# Patient Record
Sex: FEMALE | Race: WHITE | NOT HISPANIC OR LATINO | Employment: OTHER | URBAN - METROPOLITAN AREA
[De-identification: names, ages, dates, MRNs, and addresses within clinical notes are randomized per-mention and may not be internally consistent; named-entity substitution may affect disease eponyms.]

---

## 2017-03-13 ENCOUNTER — CONVERSION ENCOUNTER (OUTPATIENT)
Dept: OTHER | Facility: HOSPITAL | Age: 52
End: 2017-03-13

## 2017-06-15 ENCOUNTER — CONVERSION ENCOUNTER (OUTPATIENT)
Dept: OTHER | Facility: HOSPITAL | Age: 52
End: 2017-06-15

## 2017-09-18 ENCOUNTER — CONVERSION ENCOUNTER (OUTPATIENT)
Dept: OTHER | Facility: HOSPITAL | Age: 52
End: 2017-09-18

## 2017-09-18 ENCOUNTER — HOSPITAL ENCOUNTER (OUTPATIENT)
Dept: OTHER | Facility: HOSPITAL | Age: 52
Setting detail: SPECIMEN
Discharge: HOME | End: 2017-09-18
Attending: FAMILY MEDICINE | Admitting: FAMILY MEDICINE

## 2017-09-19 ENCOUNTER — HOSPITAL ENCOUNTER (OUTPATIENT)
Dept: OTHER | Facility: HOSPITAL | Age: 52
Setting detail: SPECIMEN
Discharge: HOME | End: 2017-09-19
Attending: FAMILY MEDICINE | Admitting: FAMILY MEDICINE

## 2017-12-21 ENCOUNTER — CONVERSION ENCOUNTER (OUTPATIENT)
Dept: OTHER | Facility: HOSPITAL | Age: 52
End: 2017-12-21

## 2018-06-18 ENCOUNTER — CONVERSION ENCOUNTER (OUTPATIENT)
Dept: OTHER | Facility: HOSPITAL | Age: 53
End: 2018-06-18

## 2018-09-26 ENCOUNTER — CONVERSION ENCOUNTER (OUTPATIENT)
Dept: OTHER | Facility: HOSPITAL | Age: 53
End: 2018-09-26

## 2018-09-26 ENCOUNTER — HOSPITAL ENCOUNTER (OUTPATIENT)
Dept: OTHER | Facility: HOSPITAL | Age: 53
Setting detail: SPECIMEN
Discharge: HOME | End: 2018-09-26
Attending: FAMILY MEDICINE | Admitting: FAMILY MEDICINE

## 2018-09-26 LAB
ALBUMIN SERPL-MCNC: 3.6 G/DL (ref 3.5–4.8)
ALBUMIN/GLOB SERPL: 1.3 {RATIO} (ref 1–1.7)
ALP SERPL-CCNC: 72 IU/L (ref 32–91)
ALT SERPL-CCNC: 14 IU/L (ref 14–54)
ANION GAP SERPL CALC-SCNC: 13 MMOL/L (ref 10–20)
AST SERPL-CCNC: 17 IU/L (ref 15–41)
BASOPHILS # BLD AUTO: 0.1 10*3/UL (ref 0–0.2)
BASOPHILS NFR BLD AUTO: 1 % (ref 0–2)
BILIRUB SERPL-MCNC: 0.7 MG/DL (ref 0.3–1.2)
BUN SERPL-MCNC: 23 MG/DL (ref 8–20)
BUN/CREAT SERPL: 23 (ref 5.4–26.2)
CALCIUM SERPL-MCNC: 8.9 MG/DL (ref 8.9–10.3)
CHLORIDE SERPL-SCNC: 111 MMOL/L (ref 101–111)
CHOLEST SERPL-MCNC: 168 MG/DL
CHOLEST/HDLC SERPL: 2.7 {RATIO}
CONV CO2: 21 MMOL/L (ref 22–32)
CONV LDL CHOLESTEROL DIRECT: 97 MG/DL (ref 0–100)
CONV TOTAL PROTEIN: 6.4 G/DL (ref 6.1–7.9)
CREAT UR-MCNC: 1 MG/DL (ref 0.4–1)
DIFFERENTIAL METHOD BLD: (no result)
EOSINOPHIL # BLD AUTO: 0.3 10*3/UL (ref 0–0.3)
EOSINOPHIL # BLD AUTO: 4 % (ref 0–3)
ERYTHROCYTE [DISTWIDTH] IN BLOOD BY AUTOMATED COUNT: 15.2 % (ref 11.5–14.5)
GLOBULIN UR ELPH-MCNC: 2.8 G/DL (ref 2.5–3.8)
GLUCOSE SERPL-MCNC: 101 MG/DL (ref 65–99)
HCT VFR BLD AUTO: 43.2 % (ref 35–49)
HDLC SERPL-MCNC: 62 MG/DL
HGB BLD-MCNC: 14.7 G/DL (ref 12–15)
LDLC/HDLC SERPL: 1.6 {RATIO}
LIPID INTERPRETATION: NORMAL
LYMPHOCYTES # BLD AUTO: 2.7 10*3/UL (ref 0.8–4.8)
LYMPHOCYTES NFR BLD AUTO: 36 % (ref 18–42)
MCH RBC QN AUTO: 28.8 PG (ref 26–32)
MCHC RBC AUTO-ENTMCNC: 34 G/DL (ref 32–36)
MCV RBC AUTO: 84.9 FL (ref 80–94)
MONOCYTES # BLD AUTO: 0.6 10*3/UL (ref 0.1–1.3)
MONOCYTES NFR BLD AUTO: 9 % (ref 2–11)
NEUTROPHILS # BLD AUTO: 3.8 10*3/UL (ref 2.3–8.6)
NEUTROPHILS NFR BLD AUTO: 50 % (ref 50–75)
NRBC BLD AUTO-RTO: 0 /100{WBCS}
NRBC/RBC NFR BLD MANUAL: 0 10*3/UL
PLATELET # BLD AUTO: 171 10*3/UL (ref 150–450)
PMV BLD AUTO: 9 FL (ref 7.4–10.4)
POTASSIUM SERPL-SCNC: 4 MMOL/L (ref 3.6–5.1)
RBC # BLD AUTO: 5.08 10*6/UL (ref 4–5.4)
SODIUM SERPL-SCNC: 141 MMOL/L (ref 136–144)
TRIGL SERPL-MCNC: 54 MG/DL
VLDLC SERPL CALC-MCNC: 8.9 MG/DL
WBC # BLD AUTO: 7.5 10*3/UL (ref 4.5–11.5)

## 2018-09-28 LAB
A PHAGOCYTOPH IGG SER QL: NORMAL
A PHAGOCYTOPH IGM SER QL: NORMAL
ANA SER QL IA: NORMAL
B BURGDOR IGG+IGM SER-ACNC: NEGATIVE
CHROMATIN AB SERPL-ACNC: <20 [IU]/ML (ref 0–20)
E CHAFFEENSIS IGG TITR SER IF: NORMAL {TITER}
EHRLICHIA CHAFFEENSIS AB, IGM: NORMAL
R TYPHI IGG TITR SER IF: NOT DETECTED {TITER}
R TYPHI IGM SER-ACNC: NOT DETECTED
RICK SF IGG TITR SER IF: NOT DETECTED {TITER}
RICK SF IGM TITR SER IF: NOT DETECTED {TITER}

## 2019-01-08 ENCOUNTER — CONVERSION ENCOUNTER (OUTPATIENT)
Dept: OTHER | Facility: HOSPITAL | Age: 54
End: 2019-01-08

## 2019-03-25 ENCOUNTER — CONVERSION ENCOUNTER (OUTPATIENT)
Dept: OTHER | Facility: HOSPITAL | Age: 54
End: 2019-03-25

## 2019-06-04 VITALS
SYSTOLIC BLOOD PRESSURE: 152 MMHG | WEIGHT: 221 LBS | HEART RATE: 82 BPM | BODY MASS INDEX: 35.58 KG/M2 | SYSTOLIC BLOOD PRESSURE: 151 MMHG | DIASTOLIC BLOOD PRESSURE: 84 MMHG | HEIGHT: 68 IN | DIASTOLIC BLOOD PRESSURE: 82 MMHG | BODY MASS INDEX: 31.83 KG/M2 | WEIGHT: 223.6 LBS | HEIGHT: 68 IN | DIASTOLIC BLOOD PRESSURE: 84 MMHG | SYSTOLIC BLOOD PRESSURE: 150 MMHG | HEART RATE: 86 BPM | SYSTOLIC BLOOD PRESSURE: 154 MMHG | HEART RATE: 76 BPM | DIASTOLIC BLOOD PRESSURE: 86 MMHG | BODY MASS INDEX: 34 KG/M2 | OXYGEN SATURATION: 100 % | HEART RATE: 85 BPM | OXYGEN SATURATION: 98 % | HEIGHT: 68 IN | WEIGHT: 216.1 LBS | BODY MASS INDEX: 35.16 KG/M2 | OXYGEN SATURATION: 98 % | DIASTOLIC BLOOD PRESSURE: 82 MMHG | WEIGHT: 232 LBS | WEIGHT: 218.7 LBS | OXYGEN SATURATION: 97 % | SYSTOLIC BLOOD PRESSURE: 138 MMHG | BODY MASS INDEX: 32.86 KG/M2 | SYSTOLIC BLOOD PRESSURE: 135 MMHG | DIASTOLIC BLOOD PRESSURE: 83 MMHG | SYSTOLIC BLOOD PRESSURE: 148 MMHG | OXYGEN SATURATION: 99 % | DIASTOLIC BLOOD PRESSURE: 86 MMHG | BODY MASS INDEX: 35.92 KG/M2 | HEART RATE: 87 BPM | OXYGEN SATURATION: 99 % | OXYGEN SATURATION: 99 % | DIASTOLIC BLOOD PRESSURE: 85 MMHG | WEIGHT: 237 LBS | BODY MASS INDEX: 33.49 KG/M2 | OXYGEN SATURATION: 100 % | HEIGHT: 68 IN | WEIGHT: 210 LBS | HEIGHT: 68 IN | BODY MASS INDEX: 33.15 KG/M2 | HEART RATE: 88 BPM | HEART RATE: 96 BPM | WEIGHT: 234.8 LBS | SYSTOLIC BLOOD PRESSURE: 152 MMHG | HEART RATE: 86 BPM | HEIGHT: 68 IN

## 2019-06-25 RX ORDER — PHENTERMINE HYDROCHLORIDE 37.5 MG/1
CAPSULE ORAL
Qty: 30 CAPSULE | Refills: 1 | OUTPATIENT
Start: 2019-06-25

## 2019-06-25 NOTE — TELEPHONE ENCOUNTER
Unfortunately can no longer prescribe. No significant withdrawal should occur.  Pt will need to find another Provider within 90 days anyway.

## 2019-06-27 ENCOUNTER — OFFICE VISIT (OUTPATIENT)
Dept: FAMILY MEDICINE CLINIC | Facility: CLINIC | Age: 54
End: 2019-06-27

## 2019-06-27 VITALS
DIASTOLIC BLOOD PRESSURE: 88 MMHG | WEIGHT: 232.4 LBS | HEIGHT: 68 IN | BODY MASS INDEX: 35.22 KG/M2 | SYSTOLIC BLOOD PRESSURE: 156 MMHG | OXYGEN SATURATION: 100 % | HEART RATE: 80 BPM

## 2019-06-27 DIAGNOSIS — J44.9 ASTHMA WITH COPD (HCC): ICD-10-CM

## 2019-06-27 DIAGNOSIS — G89.4 CHRONIC PAIN DISORDER: ICD-10-CM

## 2019-06-27 DIAGNOSIS — E66.9 OBESITY WITHOUT SERIOUS COMORBIDITY, UNSPECIFIED CLASSIFICATION, UNSPECIFIED OBESITY TYPE: ICD-10-CM

## 2019-06-27 DIAGNOSIS — R87.619 ABNORMAL CYTOLOGICAL FINDING IN SPECIMEN FROM CERVIX UTERI: ICD-10-CM

## 2019-06-27 DIAGNOSIS — Z71.6 TOBACCO ABUSE COUNSELING: ICD-10-CM

## 2019-06-27 DIAGNOSIS — M79.7 FIBROMYALGIA: ICD-10-CM

## 2019-06-27 DIAGNOSIS — Z00.00 ENCOUNTER FOR ROUTINE HISTORY AND PHYSICAL EXAM IN FEMALE: ICD-10-CM

## 2019-06-27 DIAGNOSIS — Z01.419 WOMEN'S ANNUAL ROUTINE GYNECOLOGICAL EXAMINATION: Primary | ICD-10-CM

## 2019-06-27 PROBLEM — M54.12 CERVICAL RADICULOPATHY: Status: ACTIVE | Noted: 2019-06-27

## 2019-06-27 PROBLEM — F41.9 CHRONIC ANXIETY: Status: ACTIVE | Noted: 2019-03-25

## 2019-06-27 PROBLEM — J20.9 ACUTE BRONCHITIS: Status: ACTIVE | Noted: 2019-01-08

## 2019-06-27 PROCEDURE — 99214 OFFICE O/P EST MOD 30 MIN: CPT | Performed by: FAMILY MEDICINE

## 2019-06-27 PROCEDURE — G0101 CA SCREEN;PELVIC/BREAST EXAM: HCPCS | Performed by: FAMILY MEDICINE

## 2019-06-27 RX ORDER — ASPIRIN 325 MG
325 TABLET ORAL DAILY
COMMUNITY
End: 2021-07-16

## 2019-06-27 RX ORDER — FLUTICASONE PROPIONATE 50 MCG
2 SPRAY, SUSPENSION (ML) NASAL DAILY
COMMUNITY
Start: 2018-08-12 | End: 2019-09-11 | Stop reason: SDUPTHER

## 2019-06-27 RX ORDER — IBUPROFEN 800 MG/1
1 TABLET ORAL 4 TIMES DAILY PRN
COMMUNITY
Start: 2017-12-21 | End: 2020-03-20 | Stop reason: SDUPTHER

## 2019-06-27 RX ORDER — POLYETHYLENE GLYCOL 3350
GRANULES (GRAM) MISCELLANEOUS
COMMUNITY
Start: 2019-02-25 | End: 2019-08-21

## 2019-06-27 RX ORDER — MONTELUKAST SODIUM 10 MG/1
1 TABLET ORAL EVERY 24 HOURS
COMMUNITY
Start: 2018-05-27 | End: 2019-10-22 | Stop reason: SDUPTHER

## 2019-06-27 RX ORDER — DOCUSATE SODIUM 100 MG/1
CAPSULE, LIQUID FILLED ORAL DAILY
COMMUNITY
Start: 2016-06-02 | End: 2019-08-21

## 2019-06-27 RX ORDER — FUROSEMIDE 20 MG/1
TABLET ORAL
COMMUNITY
Start: 2018-05-07 | End: 2019-08-04 | Stop reason: SDUPTHER

## 2019-06-27 RX ORDER — ALBUTEROL SULFATE 90 UG/1
2 AEROSOL, METERED RESPIRATORY (INHALATION) 4 TIMES DAILY PRN
COMMUNITY
Start: 2016-06-02 | End: 2020-01-21

## 2019-06-27 RX ORDER — HYDROCODONE BITARTRATE AND ACETAMINOPHEN 10; 325 MG/1; MG/1
TABLET ORAL
COMMUNITY
Start: 2015-01-28 | End: 2019-07-02 | Stop reason: SDUPTHER

## 2019-06-27 RX ORDER — BENZONATATE 100 MG/1
1 CAPSULE ORAL 3 TIMES DAILY PRN
COMMUNITY
Start: 2019-01-08 | End: 2019-12-23

## 2019-06-27 RX ORDER — TOPIRAMATE 50 MG/1
TABLET, FILM COATED ORAL EVERY 12 HOURS
COMMUNITY
Start: 2018-04-22 | End: 2019-07-29

## 2019-06-27 RX ORDER — PHENTERMINE HYDROCHLORIDE 37.5 MG/1
CAPSULE ORAL
COMMUNITY
Start: 2018-04-22 | End: 2019-08-21 | Stop reason: SDUPTHER

## 2019-06-27 RX ORDER — GUAIFENESIN AND DEXTROMETHORPHAN HYDROBROMIDE 1200; 60 MG/1; MG/1
1 TABLET, EXTENDED RELEASE ORAL 2 TIMES DAILY PRN
COMMUNITY

## 2019-06-27 RX ORDER — LORATADINE 10 MG/1
10 TABLET ORAL DAILY
COMMUNITY
Start: 2015-01-28

## 2019-06-27 RX ORDER — POTASSIUM CHLORIDE 750 MG/1
TABLET, FILM COATED, EXTENDED RELEASE ORAL
COMMUNITY
Start: 2017-06-01 | End: 2019-08-28 | Stop reason: SDUPTHER

## 2019-06-27 NOTE — PATIENT INSTRUCTIONS
Pap completed and sent.  Mammogram order entered.  Recommend Colonoscopy/Cologard screening next year. (FH Neg).  Repeat Preventive Health Labs in 6 mo w F/U prn or in 6 mo. (Planning transfer of care to [facility]).

## 2019-06-27 NOTE — PROGRESS NOTES
"Subjective   Nicki Fraser is a 53 y.o. female.     Pt in for Annual GYN Exam w Preventive Visit and Labs.  Cont to struggle w Obesity and CPS due to Fibromyalgia which is affecting activity level.    /88   Pulse 80   Ht 172.7 cm (67.99\")   Wt 105 kg (232 lb 6.4 oz)   SpO2 100%   BMI 35.34 kg/m²      The following portions of the patient's history were reviewed and updated as appropriate: allergies, current medications, past family history, past medical history, past social history, past surgical history and problem list.    Review of Systems   Constitutional: Positive for fatigue.        Chronic Fatigue w Myalgias and Arthralgias.   HENT: Negative.    Eyes: Negative.    Respiratory: Positive for cough, chest tightness and wheezing.         Smoker trying to quit.   Cardiovascular: Positive for leg swelling. Negative for palpitations.        HBP.   Gastrointestinal: Negative.    Endocrine: Negative.    Genitourinary: Negative.    Musculoskeletal: Positive for arthralgias, back pain, gait problem, myalgias, neck pain and neck stiffness.   Skin: Negative.    Allergic/Immunologic: Negative.    Neurological: Positive for weakness.        CPS. LE Weakness.   Hematological: Negative.        Objective   Physical Exam   Constitutional: She is oriented to person, place, and time. No distress.   Chronic Overweight.   HENT:   Head: Normocephalic and atraumatic.   Nose: Nose normal.   Mouth/Throat: Oropharynx is clear and moist.   Eyes: Conjunctivae and EOM are normal. Pupils are equal, round, and reactive to light. No scleral icterus.   Neck: No thyromegaly present.   Mild stiffness and pain on ROM.     Cardiovascular: Normal rate, regular rhythm, normal heart sounds and intact distal pulses. Exam reveals no gallop.   No murmur heard.  Pulmonary/Chest: No respiratory distress. She has wheezes. She has no rales. She exhibits tenderness.   Mild-mod increase in Resp effort. No distress.  Scattered rhonchi " and wheezes bilat.   Genitourinary: Vagina normal and uterus normal.   Genitourinary Comments: Breasts symmetrical w no masses, tenderness, discharge, no adenopathy.  Cervix appears NL. Uterus mobile and non-tender.  Adnexae NL.    Musculoskeletal: She exhibits tenderness.   Diffuse muscle and joint tenderness. No acute joint inflammation.   Lymphadenopathy:     She has no cervical adenopathy.   Neurological: She is alert and oriented to person, place, and time. No cranial nerve deficit or sensory deficit. She exhibits abnormal muscle tone.   Skin: Skin is dry. Capillary refill takes 2 to 3 seconds. No rash noted.   Psychiatric: Her behavior is normal. Judgment and thought content normal.   Mild anxiety and depression.  No harm thoughts.       Assessment/Plan   Nicki was seen today for gynecologic exam.    Diagnoses and all orders for this visit:    Women's annual routine gynecological examination  -     Cancel: Liquid-based Pap Smear, Screening; Future  -     Cancel: Liquid-based Pap Smear, Screening  -     Liquid-based Pap Smear, Screening    Encounter for routine history and physical exam in female    Obesity without serious comorbidity, unspecified classification, unspecified obesity type    Fibromyalgia    Chronic pain disorder    Tobacco abuse counseling

## 2019-07-02 ENCOUNTER — OFFICE VISIT (OUTPATIENT)
Dept: PAIN MEDICINE | Facility: CLINIC | Age: 54
End: 2019-07-02

## 2019-07-02 VITALS
DIASTOLIC BLOOD PRESSURE: 97 MMHG | HEART RATE: 86 BPM | WEIGHT: 230 LBS | BODY MASS INDEX: 34.86 KG/M2 | OXYGEN SATURATION: 99 % | SYSTOLIC BLOOD PRESSURE: 163 MMHG | HEIGHT: 68 IN | TEMPERATURE: 98 F | RESPIRATION RATE: 16 BRPM

## 2019-07-02 DIAGNOSIS — M48.02 CERVICAL STENOSIS OF SPINAL CANAL: ICD-10-CM

## 2019-07-02 DIAGNOSIS — M54.2 CERVICALGIA: ICD-10-CM

## 2019-07-02 DIAGNOSIS — F19.90 CURRENT DRUG USE: Primary | ICD-10-CM

## 2019-07-02 DIAGNOSIS — M54.6 CHRONIC MIDLINE THORACIC BACK PAIN: ICD-10-CM

## 2019-07-02 DIAGNOSIS — M54.12 CERVICAL RADICULOPATHY: Primary | ICD-10-CM

## 2019-07-02 DIAGNOSIS — G89.29 CHRONIC MIDLINE LOW BACK PAIN, WITH SCIATICA PRESENCE UNSPECIFIED: ICD-10-CM

## 2019-07-02 DIAGNOSIS — G89.29 CHRONIC MIDLINE THORACIC BACK PAIN: ICD-10-CM

## 2019-07-02 DIAGNOSIS — M54.5 CHRONIC MIDLINE LOW BACK PAIN, WITH SCIATICA PRESENCE UNSPECIFIED: ICD-10-CM

## 2019-07-02 PROCEDURE — 99214 OFFICE O/P EST MOD 30 MIN: CPT | Performed by: PHYSICAL MEDICINE & REHABILITATION

## 2019-07-02 PROCEDURE — G0463 HOSPITAL OUTPT CLINIC VISIT: HCPCS | Performed by: PHYSICAL MEDICINE & REHABILITATION

## 2019-07-02 RX ORDER — HYDROCODONE BITARTRATE AND ACETAMINOPHEN 10; 325 MG/1; MG/1
1 TABLET ORAL EVERY 8 HOURS PRN
Qty: 21 TABLET | Refills: 0 | Status: SHIPPED | OUTPATIENT
Start: 2019-07-02 | End: 2019-07-15 | Stop reason: SDUPTHER

## 2019-07-02 NOTE — PROGRESS NOTES
Subjective   Nicki Fraser is a 53 y.o. female.     Neck and back pain, 10/10 at worst, 7/10 at best, always present, varies, aching, stabbing, worse with bending and lifting, interferes with ADLs, sleep, activity, failed PT, chiropractor, meds. MRI C-spine with DDD, stenosis. Saw Dr. Overton, had PT, given Norco 10mg TID prn, began receiving opioids from PCP, down to BID with less relief, PCP retiring. Failed injections, surgery in past.         The following portions of the patient's history were reviewed and updated as appropriate: allergies, current medications, past family history, past medical history, past social history, past surgical history and problem list.    Review of Systems   Constitutional: Positive for fatigue. Negative for chills and fever.   HENT: Negative for hearing loss and trouble swallowing.    Eyes: Negative for visual disturbance.   Respiratory: Negative for shortness of breath.    Cardiovascular: Negative for chest pain.   Gastrointestinal: Positive for constipation and nausea. Negative for abdominal pain, diarrhea and vomiting.   Genitourinary: Negative for urinary incontinence.   Musculoskeletal: Positive for arthralgias, back pain, joint swelling and neck pain. Negative for myalgias.   Neurological: Positive for dizziness and headache. Negative for weakness and numbness.       Objective   Physical Exam   Constitutional: She is oriented to person, place, and time. She appears well-developed and well-nourished.   HENT:   Head: Normocephalic and atraumatic.   Eyes: EOM are normal. Pupils are equal, round, and reactive to light.   Neck: Normal range of motion.   Cardiovascular: Normal rate, regular rhythm, normal heart sounds and intact distal pulses.   Pulmonary/Chest: Breath sounds normal.   Abdominal: Soft. Bowel sounds are normal. She exhibits no distension. There is no tenderness.   Neurological: She is alert and oriented to person, place, and time. She has normal strength  and normal reflexes. She displays normal reflexes. No sensory deficit.   Psychiatric: She has a normal mood and affect. Her behavior is normal. Thought content normal.         Assessment/Plan   Nicki was seen today for neck pain and back pain.    Diagnoses and all orders for this visit:    Cervical radiculopathy    Cervicalgia    Chronic midline thoracic back pain    Chronic midline low back pain, with sciatica presence unspecified    Cervical stenosis of spinal canal      Discussed risks and benefits of opioid treatment for chonic pain with patient, including expectations related to prescription requests, alternative modalities to opioids for managing pain, her treatment plan, risks of dependency and addiction, and safe storage practices for prescribed opioids, as well as proper and improper disposal of all medications.  Will obtain UDS today, pain contract today.  Inspect report reviewed, prior notes reviewed, consistent with patient's stated history.  Treatment plan will consist of continuing current medication as long as it remains effective and is necessary, while evaluating patient at each visit and determining if the medication can be lowered or discontinued, while also using nonopioid therapies to reduce reliance on opioids.  Increase to Norco 10mg TID prn next refill.  Failed injections, surgery.  RTC 1-2 months for f/u. Will escribe [place] if UDS in order before next visit.

## 2019-07-09 DIAGNOSIS — R87.610 ATYPICAL SQUAMOUS CELLS OF UNDETERMINED SIGNIFICANCE (ASCUS) ON PAPANICOLAOU SMEAR OF CERVIX: Primary | ICD-10-CM

## 2019-07-09 LAB
CONV .: ABNORMAL
CYTOLOGIST CVX/VAG CYTO: ABNORMAL
CYTOLOGY CVX/VAG DOC CYTO: ABNORMAL
DX ICD CODE: ABNORMAL
DX ICD CODE: ABNORMAL
HIV 1 & 2 AB SER-IMP: ABNORMAL
HPV I/H RISK 1 DNA CVX QL PROBE+SIG AMP: NEGATIVE
OTHER STN SPEC: ABNORMAL
PATHOLOGIST CVX/VAG CYTO: ABNORMAL
STAT OF ADQ CVX/VAG CYTO-IMP: ABNORMAL

## 2019-07-15 ENCOUNTER — TELEPHONE (OUTPATIENT)
Dept: PAIN MEDICINE | Facility: HOSPITAL | Age: 54
End: 2019-07-15

## 2019-07-15 RX ORDER — HYDROCODONE BITARTRATE AND ACETAMINOPHEN 10; 325 MG/1; MG/1
1 TABLET ORAL EVERY 8 HOURS PRN
Qty: 90 TABLET | Refills: 0 | Status: SHIPPED | OUTPATIENT
Start: 2019-07-15 | End: 2019-08-05 | Stop reason: SDUPTHER

## 2019-07-29 RX ORDER — POTASSIUM CHLORIDE 750 MG/1
TABLET, EXTENDED RELEASE ORAL
Qty: 90 TABLET | Refills: 1 | Status: SHIPPED | OUTPATIENT
Start: 2019-07-29 | End: 2019-08-28

## 2019-07-29 RX ORDER — TOPIRAMATE 25 MG/1
TABLET ORAL
Qty: 60 TABLET | Refills: 5 | Status: SHIPPED | OUTPATIENT
Start: 2019-07-29 | End: 2019-08-28

## 2019-08-02 RX ORDER — POLYETHYLENE GLYCOL 3350 17 G/17G
POWDER, FOR SOLUTION ORAL
Qty: 1 EACH | Refills: 4 | Status: SHIPPED | OUTPATIENT
Start: 2019-08-02 | End: 2019-08-21 | Stop reason: SDUPTHER

## 2019-08-05 ENCOUNTER — OFFICE VISIT (OUTPATIENT)
Dept: PAIN MEDICINE | Facility: CLINIC | Age: 54
End: 2019-08-05

## 2019-08-05 VITALS
OXYGEN SATURATION: 98 % | DIASTOLIC BLOOD PRESSURE: 91 MMHG | HEART RATE: 87 BPM | HEIGHT: 68 IN | SYSTOLIC BLOOD PRESSURE: 160 MMHG | TEMPERATURE: 96.7 F | RESPIRATION RATE: 16 BRPM | WEIGHT: 234 LBS | BODY MASS INDEX: 35.46 KG/M2

## 2019-08-05 DIAGNOSIS — M25.50 ARTHRALGIA, UNSPECIFIED JOINT: ICD-10-CM

## 2019-08-05 DIAGNOSIS — M54.40 CHRONIC BILATERAL LOW BACK PAIN WITH SCIATICA, SCIATICA LATERALITY UNSPECIFIED: ICD-10-CM

## 2019-08-05 DIAGNOSIS — M48.02 CERVICAL SPINAL STENOSIS: ICD-10-CM

## 2019-08-05 DIAGNOSIS — G89.4 CHRONIC PAIN DISORDER: ICD-10-CM

## 2019-08-05 DIAGNOSIS — M54.12 CERVICAL RADICULOPATHY: Primary | ICD-10-CM

## 2019-08-05 DIAGNOSIS — M79.7 FIBROMYALGIA: ICD-10-CM

## 2019-08-05 DIAGNOSIS — M54.6 CHRONIC BILATERAL THORACIC BACK PAIN: ICD-10-CM

## 2019-08-05 DIAGNOSIS — M79.10 MYALGIA: ICD-10-CM

## 2019-08-05 DIAGNOSIS — G89.29 CHRONIC BILATERAL LOW BACK PAIN WITH SCIATICA, SCIATICA LATERALITY UNSPECIFIED: ICD-10-CM

## 2019-08-05 DIAGNOSIS — G89.29 CHRONIC BILATERAL THORACIC BACK PAIN: ICD-10-CM

## 2019-08-05 PROBLEM — M54.41 CHRONIC BILATERAL LOW BACK PAIN WITH SCIATICA: Status: ACTIVE | Noted: 2019-08-05

## 2019-08-05 PROBLEM — M54.42 CHRONIC BILATERAL LOW BACK PAIN WITH SCIATICA: Status: ACTIVE | Noted: 2019-08-05

## 2019-08-05 PROCEDURE — 99214 OFFICE O/P EST MOD 30 MIN: CPT | Performed by: PHYSICAL MEDICINE & REHABILITATION

## 2019-08-05 PROCEDURE — G0463 HOSPITAL OUTPT CLINIC VISIT: HCPCS | Performed by: PHYSICAL MEDICINE & REHABILITATION

## 2019-08-05 RX ORDER — HYDROCODONE BITARTRATE AND ACETAMINOPHEN 10; 325 MG/1; MG/1
1 TABLET ORAL EVERY 8 HOURS PRN
Qty: 90 TABLET | Refills: 0 | Status: SHIPPED | OUTPATIENT
Start: 2019-08-05 | End: 2019-08-05 | Stop reason: SDUPTHER

## 2019-08-05 RX ORDER — HYDROCODONE BITARTRATE AND ACETAMINOPHEN 10; 325 MG/1; MG/1
1 TABLET ORAL EVERY 8 HOURS PRN
Qty: 90 TABLET | Refills: 0 | Status: SHIPPED | OUTPATIENT
Start: 2019-08-05 | End: 2019-11-04 | Stop reason: SDUPTHER

## 2019-08-05 RX ORDER — FUROSEMIDE 20 MG/1
TABLET ORAL
Qty: 60 TABLET | Refills: 4 | Status: SHIPPED | OUTPATIENT
Start: 2019-08-05 | End: 2019-08-28

## 2019-08-05 RX ORDER — TIZANIDINE 4 MG/1
4 TABLET ORAL EVERY 8 HOURS PRN
Qty: 90 TABLET | Refills: 2 | Status: SHIPPED | OUTPATIENT
Start: 2019-08-05 | End: 2020-01-21

## 2019-08-05 NOTE — PROGRESS NOTES
Subjective   Nicki Fraser is a 53 y.o. female.     Neck and back pain, 10/10 at worst, 7/10 at best, always present, varies, aching, stabbing, worse with bending and lifting, interferes with ADLs, sleep, activity, failed PT, chiropractor, meds. MRI C-spine with DDD, stenosis. Saw Dr. Overton, had PT, given Norco 10mg TID prn, began receiving opioids from PCP, down to BID with less relief, PCP retiring, increased here to TID prn. Failed injections, surgery in past. Back pain worse after housework, no b/b incontinence, no new weakness or numbness.         The following portions of the patient's history were reviewed and updated as appropriate: allergies, current medications, past family history, past medical history, past social history, past surgical history and problem list.    Review of Systems   Constitutional: Positive for fatigue. Negative for chills and fever.   HENT: Negative for hearing loss and trouble swallowing.    Eyes: Negative for visual disturbance.   Respiratory: Negative for shortness of breath.    Cardiovascular: Negative for chest pain.   Gastrointestinal: Positive for constipation and nausea. Negative for abdominal pain, diarrhea and vomiting.   Genitourinary: Negative for urinary incontinence.   Musculoskeletal: Positive for arthralgias, back pain, joint swelling and neck pain. Negative for myalgias.   Neurological: Positive for dizziness and headache. Negative for weakness and numbness.       Objective   Physical Exam   Constitutional: She is oriented to person, place, and time. She appears well-developed and well-nourished.   HENT:   Head: Normocephalic and atraumatic.   Eyes: EOM are normal. Pupils are equal, round, and reactive to light.   Neck: Normal range of motion.   Cardiovascular: Normal rate, regular rhythm, normal heart sounds and intact distal pulses.   Pulmonary/Chest: Breath sounds normal.   Abdominal: Soft. Bowel sounds are normal. She exhibits no distension. There is  no tenderness.   Neurological: She is alert and oriented to person, place, and time. She has normal strength and normal reflexes. She displays normal reflexes. No sensory deficit.   Psychiatric: She has a normal mood and affect. Her behavior is normal. Thought content normal.         Assessment/Plan   Nicki was seen today for back pain.    Diagnoses and all orders for this visit:    Cervical radiculopathy    Arthralgia, unspecified joint    Myalgia    Chronic pain disorder    Fibromyalgia    Chronic bilateral thoracic back pain    Chronic bilateral low back pain with sciatica, sciatica laterality unspecified    Cervical spinal stenosis      Inspect reviewed, in order. UDS in order 7/2/19.  Treatment plan will consist of continuing current medication as long as it remains effective and is necessary, while evaluating patient at each visit and determining if the medication can be lowered or discontinued, while also using nonopioid therapies to reduce reliance on opioids.  Increased to Norco 10mg TID prn, continue.  Begin Tizanidine 4mg TID prn.   Failed injections, surgery.  Pt will ask PCP for referral to allergist for possible bee sting allergy.  RTC 3 months for f/u.

## 2019-08-21 ENCOUNTER — OFFICE VISIT (OUTPATIENT)
Dept: FAMILY MEDICINE CLINIC | Facility: CLINIC | Age: 54
End: 2019-08-21

## 2019-08-21 VITALS
RESPIRATION RATE: 14 BRPM | TEMPERATURE: 98 F | HEIGHT: 68 IN | OXYGEN SATURATION: 99 % | HEART RATE: 77 BPM | BODY MASS INDEX: 35.61 KG/M2 | DIASTOLIC BLOOD PRESSURE: 85 MMHG | WEIGHT: 235 LBS | SYSTOLIC BLOOD PRESSURE: 146 MMHG

## 2019-08-21 DIAGNOSIS — J45.41 MODERATE PERSISTENT ASTHMA WITH ACUTE EXACERBATION: ICD-10-CM

## 2019-08-21 DIAGNOSIS — E66.01 MORBIDLY OBESE (HCC): ICD-10-CM

## 2019-08-21 DIAGNOSIS — I10 ESSENTIAL HYPERTENSION: Primary | ICD-10-CM

## 2019-08-21 DIAGNOSIS — Z72.0 CONTINUOUS TOBACCO ABUSE: ICD-10-CM

## 2019-08-21 PROCEDURE — 99214 OFFICE O/P EST MOD 30 MIN: CPT | Performed by: FAMILY MEDICINE

## 2019-08-21 RX ORDER — POLYETHYLENE GLYCOL 3350 17 G/17G
17 POWDER, FOR SOLUTION ORAL DAILY
Qty: 500 G | Refills: 4 | Status: SHIPPED | OUTPATIENT
Start: 2019-08-21 | End: 2020-06-01 | Stop reason: SDUPTHER

## 2019-08-21 RX ORDER — PHENTERMINE HYDROCHLORIDE 37.5 MG/1
37.5 CAPSULE ORAL EVERY MORNING
Qty: 30 CAPSULE | Refills: 0 | Status: SHIPPED | OUTPATIENT
Start: 2019-08-21 | End: 2019-09-17 | Stop reason: SDUPTHER

## 2019-08-28 ENCOUNTER — TELEPHONE (OUTPATIENT)
Dept: FAMILY MEDICINE CLINIC | Facility: CLINIC | Age: 54
End: 2019-08-28

## 2019-08-28 PROBLEM — Z00.00 ENCOUNTER FOR ROUTINE HISTORY AND PHYSICAL EXAM IN FEMALE: Status: RESOLVED | Noted: 2017-09-18 | Resolved: 2019-08-28

## 2019-08-28 PROBLEM — I10 HYPERTENSION: Status: ACTIVE | Noted: 2019-08-28

## 2019-08-28 PROBLEM — G43.909 MIGRAINE HEADACHE: Status: ACTIVE | Noted: 2019-08-28

## 2019-08-28 RX ORDER — TOPIRAMATE 25 MG/1
25 TABLET ORAL 2 TIMES DAILY
Qty: 60 TABLET | Refills: 5
Start: 2019-08-28 | End: 2020-01-21 | Stop reason: SDUPTHER

## 2019-08-28 RX ORDER — POTASSIUM CHLORIDE 750 MG/1
10 TABLET, EXTENDED RELEASE ORAL DAILY
Qty: 90 TABLET | Refills: 1
Start: 2019-08-28 | End: 2019-10-22

## 2019-08-28 RX ORDER — FUROSEMIDE 20 MG/1
20 TABLET ORAL DAILY
Qty: 60 TABLET | Refills: 4
Start: 2019-08-28 | End: 2019-10-22

## 2019-08-28 NOTE — TELEPHONE ENCOUNTER
Please sign off on 8/21 OV so we can fax to [facility]. They need the notes faxed before they can fill neb/meds.    Fax to [facility] pharm # [phone]

## 2019-08-28 NOTE — PROGRESS NOTES
Subjective   Nicki Fraser is a 53 y.o. female.     Chief Complaint   Patient presents with   • Weight Gain     patient would like to get phentermine refilled if possible.    • Asthma         Current Outpatient Medications:   •  albuterol sulfate HFA (VENTOLIN HFA) 108 (90 Base) MCG/ACT inhaler, Inhale 2 puffs 4 (Four) Times a Day As Needed., Disp: , Rfl:   •  aspirin 325 MG tablet, Take 325 mg by mouth Daily., Disp: , Rfl:   •  benzonatate (TESSALON PERLES) 100 MG capsule, 1 capsule 3 (Three) Times a Day As Needed., Disp: , Rfl:   •  Cholecalciferol (EQL VITAMIN D3) 2000 units capsule, Take 1 capsule by mouth Daily., Disp: , Rfl:   •  dextromethorphan-guaifenesin (MUCINEX DM MAX STRENGTH)  MG per 12 hr tablet, Take 1 tablet by mouth 2 (Two) Times a Day As Needed., Disp: , Rfl:   •  fluticasone (FLONASE) 50 MCG/ACT nasal spray, 2 sprays by Each Nare route Daily., Disp: , Rfl:   •  furosemide (LASIX) 20 MG tablet, Take 1 tablet by mouth Daily., Disp: 60 tablet, Rfl: 4  •  HYDROcodone-acetaminophen (NORCO)  MG per tablet, Take 1 tablet by mouth Every 8 (Eight) Hours As Needed for Moderate Pain ., Disp: 90 tablet, Rfl: 0  •  ibuprofen (ADVIL,MOTRIN) 800 MG tablet, Take 1 tablet by mouth 4 (Four) Times a Day As Needed., Disp: , Rfl:   •  loratadine (CLARITIN) 10 MG tablet, Take 10 mg by mouth Daily., Disp: , Rfl:   •  montelukast (SINGULAIR) 10 MG tablet, Take 1 tablet by mouth Daily., Disp: , Rfl:   •  phentermine 37.5 MG capsule, Take 1 capsule by mouth Every Morning., Disp: 30 capsule, Rfl: 0  •  polyethylene glycol (MIRALAX) powder, Take 17 g by mouth Daily., Disp: 500 g, Rfl: 4  •  potassium chloride (K-DUR,KLOR-CON) 10 MEQ CR tablet, Take 1 tablet by mouth Daily., Disp: 90 tablet, Rfl: 1  •  tiZANidine (ZANAFLEX) 4 MG tablet, Take 1 tablet by mouth Every 8 (Eight) Hours As Needed for Muscle Spasms., Disp: 90 tablet, Rfl: 2  •  topiramate (TOPAMAX) 25 MG tablet, Take 1 tablet by mouth 2  (Two) Times a Day., Disp: 60 tablet, Rfl: 5    Past Medical History:   Diagnosis Date   • Cervical radiculopathy    • DDD (degenerative disc disease), cervical    • Fibromyalgia    • Herniated disc, cervical     Abstraction from [employer] Herniated Disc; Cervical Degenerated disc.   • Hypertension    • Left shoulder pain    • Migraine headache    • Myalgia    • Peripheral edema        Past Surgical History:   Procedure Laterality Date   •  SECTION     • LYMPH NODE DISSECTION      cervical node= NEG   • TUBAL ABDOMINAL LIGATION         Family History   Problem Relation Age of Onset   • Heart disease Mother    • Osteoporosis Mother    • Hypertension Father    • Kidney disease Father         KIDNEY STONES   • Stroke Father    • Arthritis Brother    • Diabetes Brother    • Heart disease Brother    • Hypertension Brother        Social History     Socioeconomic History   • Marital status:      Spouse name: Not on file   • Number of children: Not on file   • Years of education: Not on file   • Highest education level: Not on file   Tobacco Use   • Smoking status: Current Every Day Smoker     Packs/day: 1.00     Years: 30.00     Pack years: 30.00     Types: Cigarettes   • Smokeless tobacco: Never Used   Substance and Sexual Activity   • Alcohol use: Yes     Comment: occ   • Drug use: No   • Sexual activity: No       54y/o C female pt here to [facility] care w/ hx/o Asthma/ HTN/ Tob Abuse and wanting refill on Phentermine for long term wt loss w/ her topamax         The following portions of the patient's history were reviewed and updated as appropriate: allergies, current medications, past family history, past medical history, past social history, past surgical history and problem list.    Review of Systems   Constitutional: Positive for unexpected weight gain.   Respiratory: Positive for cough, shortness of breath and wheezing. Negative for apnea, choking and stridor.    Genitourinary: Negative for decreased  urine volume, difficulty urinating and dysuria.       Vitals:    08/21/19 1032   BP: 146/85   Pulse: 77   Resp: 14   Temp: 98 °F (36.7 °C)   SpO2: 99%       Objective   Physical Exam   Constitutional: She is oriented to person, place, and time. She appears well-developed and well-nourished. She is cooperative. She is obese.  HENT:   Head: Normocephalic and atraumatic.   Neck: Normal range of motion. Neck supple.   Cardiovascular: Normal rate, regular rhythm, normal heart sounds and intact distal pulses.   No murmur heard.  Pulmonary/Chest: Effort normal. She has decreased breath sounds. She has wheezes. She has no rales.   Musculoskeletal: She exhibits no edema.   Neurological: She is alert and oriented to person, place, and time.   Skin: Skin is warm and dry. No rash noted.   Psychiatric: She has a normal mood and affect. Her behavior is normal. Judgment and thought content normal.   Nursing note and vitals reviewed.        Assessment/Plan   Nicki was seen today for weight gain and asthma.    Diagnoses and all orders for this visit:    Essential hypertension  -     Lipid Panel; Future  -     Comprehensive Metabolic Panel; Future    Moderate persistent asthma with acute exacerbation    Morbidly obese (CMS/HCC)    Continuous tobacco abuse    Other orders  -     phentermine 37.5 MG capsule; Take 1 capsule by mouth Every Morning.  -     polyethylene glycol (MIRALAX) powder; Take 17 g by mouth Daily.  -     topiramate (TOPAMAX) 25 MG tablet; Take 1 tablet by mouth 2 (Two) Times a Day.  -     furosemide (LASIX) 20 MG tablet; Take 1 tablet by mouth Daily.  -     potassium chloride (K-DUR,KLOR-CON) 10 MEQ CR tablet; Take 1 tablet by mouth Daily.

## 2019-09-11 RX ORDER — FLUTICASONE PROPIONATE 50 MCG
SPRAY, SUSPENSION (ML) NASAL
Qty: 1 BOTTLE | Refills: 0 | Status: SHIPPED | OUTPATIENT
Start: 2019-09-11 | End: 2019-11-01 | Stop reason: SDUPTHER

## 2019-09-17 RX ORDER — PHENTERMINE HYDROCHLORIDE 37.5 MG/1
CAPSULE ORAL
Qty: 30 CAPSULE | Refills: 0 | Status: SHIPPED | OUTPATIENT
Start: 2019-09-17 | End: 2019-10-22 | Stop reason: SDUPTHER

## 2019-09-23 RX ORDER — IBUPROFEN 800 MG/1
TABLET ORAL
Qty: 90 TABLET | Refills: 4 | OUTPATIENT
Start: 2019-09-23

## 2019-09-25 ENCOUNTER — CLINICAL SUPPORT (OUTPATIENT)
Dept: FAMILY MEDICINE CLINIC | Facility: CLINIC | Age: 54
End: 2019-09-25

## 2019-09-25 VITALS
HEART RATE: 76 BPM | DIASTOLIC BLOOD PRESSURE: 77 MMHG | WEIGHT: 234 LBS | SYSTOLIC BLOOD PRESSURE: 129 MMHG | BODY MASS INDEX: 35.58 KG/M2

## 2019-09-25 DIAGNOSIS — I10 ESSENTIAL HYPERTENSION: ICD-10-CM

## 2019-09-25 LAB
ALBUMIN SERPL-MCNC: 3.7 G/DL (ref 3.5–4.8)
ALBUMIN/GLOB SERPL: 1.4 G/DL (ref 1–1.7)
ALP SERPL-CCNC: 84 U/L (ref 32–91)
ALT SERPL W P-5'-P-CCNC: 17 U/L (ref 14–54)
ANION GAP SERPL CALCULATED.3IONS-SCNC: 14.5 MMOL/L (ref 5–15)
ARTICHOKE IGE QN: 104 MG/DL (ref 0–100)
AST SERPL-CCNC: 18 U/L (ref 15–41)
BILIRUB SERPL-MCNC: 0.4 MG/DL (ref 0.3–1.2)
BUN BLD-MCNC: 23 MG/DL (ref 8–20)
BUN/CREAT SERPL: 20.9 (ref 5.4–26.2)
CALCIUM SPEC-SCNC: 9.3 MG/DL (ref 8.9–10.3)
CHLORIDE SERPL-SCNC: 106 MMOL/L (ref 101–111)
CHOLEST SERPL-MCNC: 168 MG/DL
CO2 SERPL-SCNC: 25 MMOL/L (ref 22–32)
CREAT BLD-MCNC: 1.1 MG/DL (ref 0.4–1)
GFR SERPL CREATININE-BSD FRML MDRD: 52 ML/MIN/1.73
GLOBULIN UR ELPH-MCNC: 2.7 GM/DL (ref 2.5–3.8)
GLUCOSE BLD-MCNC: 111 MG/DL (ref 65–99)
HDLC SERPL QL: 2.51
HDLC SERPL-MCNC: 67 MG/DL
LDLC/HDLC SERPL: 1.36 {RATIO}
POTASSIUM BLD-SCNC: 4.5 MMOL/L (ref 3.6–5.1)
PROT SERPL-MCNC: 6.4 G/DL (ref 6.1–7.9)
SODIUM BLD-SCNC: 141 MMOL/L (ref 136–144)
TRIGL SERPL-MCNC: 49 MG/DL
VLDLC SERPL-MCNC: 9.8 MG/DL

## 2019-09-25 PROCEDURE — 36415 COLL VENOUS BLD VENIPUNCTURE: CPT | Performed by: FAMILY MEDICINE

## 2019-09-25 PROCEDURE — 80061 LIPID PANEL: CPT | Performed by: FAMILY MEDICINE

## 2019-09-25 PROCEDURE — 80053 COMPREHEN METABOLIC PANEL: CPT | Performed by: FAMILY MEDICINE

## 2019-10-17 ENCOUNTER — TELEPHONE (OUTPATIENT)
Dept: PAIN MEDICINE | Facility: CLINIC | Age: 54
End: 2019-10-17

## 2019-10-17 NOTE — TELEPHONE ENCOUNTER
PA for Hydrocodone was denied it is a benefit exclusion and is not covered by the plan. No alternatives were given.

## 2019-10-22 ENCOUNTER — OFFICE VISIT (OUTPATIENT)
Dept: FAMILY MEDICINE CLINIC | Facility: CLINIC | Age: 54
End: 2019-10-22

## 2019-10-22 VITALS
DIASTOLIC BLOOD PRESSURE: 88 MMHG | HEIGHT: 68 IN | OXYGEN SATURATION: 100 % | SYSTOLIC BLOOD PRESSURE: 144 MMHG | HEART RATE: 87 BPM | BODY MASS INDEX: 35.46 KG/M2 | WEIGHT: 234 LBS | RESPIRATION RATE: 18 BRPM | TEMPERATURE: 98.7 F

## 2019-10-22 DIAGNOSIS — E66.01 CLASS 3 SEVERE OBESITY DUE TO EXCESS CALORIES WITHOUT SERIOUS COMORBIDITY WITH BODY MASS INDEX (BMI) OF 40.0 TO 44.9 IN ADULT (HCC): ICD-10-CM

## 2019-10-22 DIAGNOSIS — I10 ESSENTIAL HYPERTENSION: ICD-10-CM

## 2019-10-22 DIAGNOSIS — J01.00 ACUTE MAXILLARY SINUSITIS, RECURRENCE NOT SPECIFIED: Primary | ICD-10-CM

## 2019-10-22 DIAGNOSIS — N28.9 RENAL INSUFFICIENCY: ICD-10-CM

## 2019-10-22 PROCEDURE — 99214 OFFICE O/P EST MOD 30 MIN: CPT | Performed by: FAMILY MEDICINE

## 2019-10-22 RX ORDER — EPINEPHRINE 0.3 MG/.3ML
0.3 INJECTION SUBCUTANEOUS ONCE
Qty: 1 EACH | Refills: 0 | Status: SHIPPED | OUTPATIENT
Start: 2019-10-22 | End: 2019-10-22

## 2019-10-22 RX ORDER — MONTELUKAST SODIUM 10 MG/1
10 TABLET ORAL EVERY 24 HOURS
Qty: 90 TABLET | Refills: 1 | Status: SHIPPED | OUTPATIENT
Start: 2019-10-22 | End: 2020-05-11

## 2019-10-22 RX ORDER — PHENTERMINE HYDROCHLORIDE 37.5 MG/1
37.5 CAPSULE ORAL EVERY MORNING
Qty: 30 CAPSULE | Refills: 0 | Status: SHIPPED | OUTPATIENT
Start: 2019-10-22 | End: 2019-11-25 | Stop reason: SDUPTHER

## 2019-10-22 RX ORDER — LISINOPRIL 10 MG/1
10 TABLET ORAL DAILY
Qty: 90 TABLET | Refills: 0 | Status: SHIPPED | OUTPATIENT
Start: 2019-10-22 | End: 2020-01-16 | Stop reason: SDUPTHER

## 2019-10-22 RX ORDER — AMOXICILLIN 875 MG/1
875 TABLET, COATED ORAL 2 TIMES DAILY
Qty: 20 TABLET | Refills: 0 | Status: SHIPPED | OUTPATIENT
Start: 2019-10-22 | End: 2019-12-23

## 2019-10-22 NOTE — PROGRESS NOTES
Subjective   Nicki Fraser is a 54 y.o. female.     Chief Complaint   Patient presents with   • Sinus Problem     cough,nasal congestion,sore throat ---All started Saturday    • Insect Bite     w/ edema   • Obesity         Current Outpatient Medications:   •  albuterol sulfate HFA (VENTOLIN HFA) 108 (90 Base) MCG/ACT inhaler, Inhale 2 puffs 4 (Four) Times a Day As Needed., Disp: , Rfl:   •  aspirin 325 MG tablet, Take 325 mg by mouth Daily., Disp: , Rfl:   •  benzonatate (TESSALON PERLES) 100 MG capsule, 1 capsule 3 (Three) Times a Day As Needed., Disp: , Rfl:   •  Cholecalciferol (EQL VITAMIN D3) 2000 units capsule, Take 1 capsule by mouth Daily., Disp: , Rfl:   •  dextromethorphan-guaifenesin (MUCINEX DM MAX STRENGTH)  MG per 12 hr tablet, Take 1 tablet by mouth 2 (Two) Times a Day As Needed., Disp: , Rfl:   •  fluticasone (FLONASE) 50 MCG/ACT nasal spray, SPRAY TWO SPRAYS IN EACH NOSTRIL ONCE DAILY AT BEDTIME, Disp: 1 bottle, Rfl: 0  •  HYDROcodone-acetaminophen (NORCO)  MG per tablet, Take 1 tablet by mouth Every 8 (Eight) Hours As Needed for Moderate Pain ., Disp: 90 tablet, Rfl: 0  •  ibuprofen (ADVIL,MOTRIN) 800 MG tablet, Take 1 tablet by mouth 4 (Four) Times a Day As Needed., Disp: , Rfl:   •  loratadine (CLARITIN) 10 MG tablet, Take 10 mg by mouth Daily., Disp: , Rfl:   •  montelukast (SINGULAIR) 10 MG tablet, Take 1 tablet by mouth Daily., Disp: 90 tablet, Rfl: 1  •  phentermine 37.5 MG capsule, Take 1 capsule by mouth Every Morning., Disp: 30 capsule, Rfl: 0  •  polyethylene glycol (MIRALAX) powder, Take 17 g by mouth Daily., Disp: 500 g, Rfl: 4  •  tiZANidine (ZANAFLEX) 4 MG tablet, Take 1 tablet by mouth Every 8 (Eight) Hours As Needed for Muscle Spasms., Disp: 90 tablet, Rfl: 2  •  topiramate (TOPAMAX) 25 MG tablet, Take 1 tablet by mouth 2 (Two) Times a Day., Disp: 60 tablet, Rfl: 5  •  amoxicillin (AMOXIL) 875 MG tablet, Take 1 tablet by mouth 2 (Two) Times a Day., Disp: 20  tablet, Rfl: 0  •  lisinopril (PRINIVIL,ZESTRIL) 10 MG tablet, Take 1 tablet by mouth Daily., Disp: 90 tablet, Rfl: 0    Past Medical History:   Diagnosis Date   • Cervical radiculopathy    • DDD (degenerative disc disease), cervical    • Fibromyalgia    • Herniated disc, cervical     Abstraction from [employer] Herniated Disc; Cervical Degenerated disc.   • Hypertension    • Left shoulder pain    • MAMMO     NEG =    • Migraine headache    • Myalgia    • Peripheral edema        Past Surgical History:   Procedure Laterality Date   •  SECTION     • COLONOSCOPY      COLOGUARD ---NEG = , rech    • LYMPH NODE DISSECTION      cervical node= NEG   • TUBAL ABDOMINAL LIGATION         Family History   Problem Relation Age of Onset   • Heart disease Mother    • Osteoporosis Mother    • Hypertension Father    • Kidney disease Father         KIDNEY STONES   • Stroke Father    • Arthritis Brother    • Diabetes Brother    • Heart disease Brother    • Hypertension Brother        Social History     Socioeconomic History   • Marital status:      Spouse name: Not on file   • Number of children: Not on file   • Years of education: Not on file   • Highest education level: Not on file   Tobacco Use   • Smoking status: Current Every Day Smoker     Packs/day: 1.00     Years: 30.00     Pack years: 30.00     Types: Cigarettes   • Smokeless tobacco: Never Used   Substance and Sexual Activity   • Alcohol use: Yes     Comment: occ   • Drug use: No   • Sexual activity: No       55y/o C female here w/ c/o's L> R sided sinus pain/ low grade temp <100 and not improving after 4 days    PT also may need an epi pen because her Left UE swelled up to her axilla an hr after a hornet or wasp stung her hand despite taking benadryl/ using ice right after the incident----never happened before    Pt also needing refill on her wt loss meds------aleksandar. Them fine but feels the phentermine could be increased......         The following  portions of the patient's history were reviewed and updated as appropriate: allergies, current medications, past family history, past medical history, past social history, past surgical history and problem list.    Review of Systems   Constitutional: Positive for fever. Negative for chills and fatigue.   HENT: Positive for congestion, rhinorrhea, sinus pressure and sore throat. Negative for ear discharge, ear pain, postnasal drip, sneezing and swollen glands.    Eyes: Negative for pain, discharge, redness, itching and visual disturbance.   Respiratory: Negative for cough, shortness of breath, wheezing and stridor.    Skin: Negative for rash.   Allergic/Immunologic: Negative for environmental allergies.   Neurological: Negative for light-headedness and headache.   Psychiatric/Behavioral: Negative for sleep disturbance.       Vitals:    10/22/19 1053   BP: 144/88   Pulse: 87   Resp: 18   Temp: 98.7 °F (37.1 °C)   SpO2: 100%       Objective   Physical Exam   Constitutional: She is oriented to person, place, and time. She appears well-developed and well-nourished. No distress.     She appears overweight.   HENT:   Head: Normocephalic and atraumatic.   Right Ear: Tympanic membrane, external ear and ear canal normal. Tympanic membrane is not erythematous.   Left Ear: External ear and ear canal normal. Tympanic membrane is bulging. Tympanic membrane is not erythematous.   Nose: Rhinorrhea and congestion present.   Mouth/Throat: Mucous membranes are normal. No oral lesions. Posterior oropharyngeal erythema present. No oropharyngeal exudate.   Eyes: Conjunctivae and EOM are normal. Pupils are equal, round, and reactive to light. Right eye exhibits no discharge. Left eye exhibits no discharge. No scleral icterus.   Neck: Normal range of motion. Neck supple. No thyromegaly present.   Cardiovascular: Normal rate, regular rhythm, normal heart sounds and intact distal pulses.   No murmur heard.  Pulmonary/Chest: Effort normal. No  respiratory distress. She has decreased breath sounds in the right lower field and the left lower field. She has no wheezes. She has no rales.   Abdominal: Soft. Bowel sounds are normal.   Lymphadenopathy:     She has no cervical adenopathy.   Neurological: She is alert and oriented to person, place, and time. No cranial nerve deficit.   Skin: Skin is warm and dry. No rash noted. She is not diaphoretic. No erythema. No pallor.   Psychiatric: She has a normal mood and affect. Her behavior is normal. Judgment and thought content normal.   Nursing note and vitals reviewed.        Assessment/Plan   Nicki was seen today for sinus problem, insect bite and obesity.    Diagnoses and all orders for this visit:    Acute maxillary sinusitis, recurrence not specified    Essential hypertension    Renal insufficiency  -     Basic Metabolic Panel; Future    Class 3 severe obesity due to excess calories without serious comorbidity with body mass index (BMI) of 40.0 to 44.9 in adult (CMS/[facility])    Other orders  -     amoxicillin (AMOXIL) 875 MG tablet; Take 1 tablet by mouth 2 (Two) Times a Day.  -     montelukast (SINGULAIR) 10 MG tablet; Take 1 tablet by mouth Daily.  -     phentermine 37.5 MG capsule; Take 1 capsule by mouth Every Morning.  -     lisinopril (PRINIVIL,ZESTRIL) 10 MG tablet; Take 1 tablet by mouth Daily.  -     EPINEPHrine (EPIPEN 2-LUCI) 0.3 MG/0.3ML solution auto-injector injection; Inject 0.3 mL into the appropriate muscle as directed by prescriber 1 (One) Time for 1 dose.    Disc'd w/ pt that phentermine is maxed out and can incr. The topamax if she wants  Epi pen disc'd at length w/ H1/H2 tx of allergic rxn  RTC 3mos

## 2019-11-01 RX ORDER — FLUTICASONE PROPIONATE 50 MCG
SPRAY, SUSPENSION (ML) NASAL
Qty: 1 BOTTLE | Refills: 0 | OUTPATIENT
Start: 2019-11-01

## 2019-11-01 RX ORDER — FLUTICASONE PROPIONATE 50 MCG
2 SPRAY, SUSPENSION (ML) NASAL DAILY
Qty: 1 BOTTLE | Refills: 4 | Status: SHIPPED | OUTPATIENT
Start: 2019-11-01 | End: 2019-11-18 | Stop reason: SDUPTHER

## 2019-11-04 ENCOUNTER — OFFICE VISIT (OUTPATIENT)
Dept: PAIN MEDICINE | Facility: CLINIC | Age: 54
End: 2019-11-04

## 2019-11-04 VITALS
HEART RATE: 84 BPM | TEMPERATURE: 98.1 F | DIASTOLIC BLOOD PRESSURE: 84 MMHG | BODY MASS INDEX: 35.61 KG/M2 | WEIGHT: 235 LBS | SYSTOLIC BLOOD PRESSURE: 133 MMHG | HEIGHT: 68 IN | OXYGEN SATURATION: 100 % | RESPIRATION RATE: 16 BRPM

## 2019-11-04 DIAGNOSIS — M54.6 CHRONIC BILATERAL THORACIC BACK PAIN: ICD-10-CM

## 2019-11-04 DIAGNOSIS — G89.29 CHRONIC BILATERAL THORACIC BACK PAIN: ICD-10-CM

## 2019-11-04 DIAGNOSIS — G89.4 CHRONIC PAIN DISORDER: ICD-10-CM

## 2019-11-04 DIAGNOSIS — M79.10 MYALGIA: ICD-10-CM

## 2019-11-04 DIAGNOSIS — M54.40 CHRONIC BILATERAL LOW BACK PAIN WITH SCIATICA, SCIATICA LATERALITY UNSPECIFIED: ICD-10-CM

## 2019-11-04 DIAGNOSIS — M54.12 CERVICAL RADICULOPATHY: Primary | ICD-10-CM

## 2019-11-04 DIAGNOSIS — M25.50 ARTHRALGIA, UNSPECIFIED JOINT: ICD-10-CM

## 2019-11-04 DIAGNOSIS — G89.29 CHRONIC BILATERAL LOW BACK PAIN WITH SCIATICA, SCIATICA LATERALITY UNSPECIFIED: ICD-10-CM

## 2019-11-04 DIAGNOSIS — M48.02 CERVICAL SPINAL STENOSIS: ICD-10-CM

## 2019-11-04 DIAGNOSIS — M79.7 FIBROMYALGIA: ICD-10-CM

## 2019-11-04 PROCEDURE — 99214 OFFICE O/P EST MOD 30 MIN: CPT | Performed by: PHYSICAL MEDICINE & REHABILITATION

## 2019-11-04 PROCEDURE — G0463 HOSPITAL OUTPT CLINIC VISIT: HCPCS | Performed by: PHYSICAL MEDICINE & REHABILITATION

## 2019-11-04 RX ORDER — HYDROCODONE BITARTRATE AND ACETAMINOPHEN 10; 325 MG/1; MG/1
1 TABLET ORAL EVERY 8 HOURS PRN
Qty: 90 TABLET | Refills: 0 | Status: SHIPPED | OUTPATIENT
Start: 2019-11-04 | End: 2019-11-04 | Stop reason: SDUPTHER

## 2019-11-04 RX ORDER — HYDROCODONE BITARTRATE AND ACETAMINOPHEN 10; 325 MG/1; MG/1
1 TABLET ORAL EVERY 8 HOURS PRN
Qty: 90 TABLET | Refills: 0 | Status: SHIPPED | OUTPATIENT
Start: 2019-11-04 | End: 2020-02-04 | Stop reason: SDUPTHER

## 2019-11-04 NOTE — PROGRESS NOTES
Subjective   Nicki Fraser is a 54 y.o. female.     Neck and back pain, 10/10 at worst, 7/10 at best, always present, varies, aching, stabbing, worse with bending and lifting, interferes with ADLs, sleep, activity, failed PT, chiropractor, meds. MRI C-spine with DDD, stenosis. Saw Dr. Overton, had PT, given Norco 10mg TID prn, began receiving opioids from PCP, down to BID with less relief, PCP retiring, increased here to TID prn. Failed injections, surgery in past. Back pain worse after housework, no b/b incontinence, no new weakness or numbness. Worsening b/l thumb and great toe pain.         The following portions of the patient's history were reviewed and updated as appropriate: allergies, current medications, past family history, past medical history, past social history, past surgical history and problem list.    Review of Systems   Constitutional: Positive for fatigue. Negative for chills and fever.   HENT: Negative for hearing loss and trouble swallowing.    Eyes: Negative for visual disturbance.   Respiratory: Negative for shortness of breath.    Cardiovascular: Negative for chest pain.   Gastrointestinal: Positive for constipation and nausea. Negative for abdominal pain, diarrhea and vomiting.   Genitourinary: Negative for urinary incontinence.   Musculoskeletal: Positive for arthralgias, back pain, joint swelling and neck pain. Negative for myalgias.   Neurological: Positive for dizziness and headache. Negative for weakness and numbness.       Objective   Physical Exam   Constitutional: She is oriented to person, place, and time. She appears well-developed and well-nourished.   HENT:   Head: Normocephalic and atraumatic.   Eyes: EOM are normal. Pupils are equal, round, and reactive to light.   Neck: Normal range of motion.   Cardiovascular: Normal rate, regular rhythm, normal heart sounds and intact distal pulses.   Pulmonary/Chest: Breath sounds normal.   Abdominal: Soft. Bowel sounds are  normal. She exhibits no distension. There is no tenderness.   Neurological: She is alert and oriented to person, place, and time. She has normal strength and normal reflexes. She displays normal reflexes. No sensory deficit.   Psychiatric: She has a normal mood and affect. Her behavior is normal. Thought content normal.         Assessment/Plan   Nicki was seen today for back pain and neck pain.    Diagnoses and all orders for this visit:    Cervical radiculopathy    Arthralgia, unspecified joint    Myalgia    Chronic pain disorder    Fibromyalgia    Chronic bilateral thoracic back pain    Chronic bilateral low back pain with sciatica, sciatica laterality unspecified    Cervical spinal stenosis      Inspect reviewed, in order. UDS in order 7/2/19.  Treatment plan will consist of continuing current medication as long as it remains effective and is necessary, while evaluating patient at each visit and determining if the medication can be lowered or discontinued, while also using nonopioid therapies to reduce reliance on opioids.  Increased to Norco 10mg TID prn, continue.  Begin Tizanidine 4mg TID prn.  Failed injections, surgery.  Insurance will not cover comp cream. Begin Voltaren.  Pt will ask PCP for referral to allergist for possible bee sting allergy.  RTC 3 months for f/u.

## 2019-11-08 ENCOUNTER — CLINICAL SUPPORT (OUTPATIENT)
Dept: FAMILY MEDICINE CLINIC | Facility: CLINIC | Age: 54
End: 2019-11-08

## 2019-11-08 VITALS — HEART RATE: 83 BPM | SYSTOLIC BLOOD PRESSURE: 105 MMHG | DIASTOLIC BLOOD PRESSURE: 71 MMHG

## 2019-11-08 DIAGNOSIS — N28.9 RENAL INSUFFICIENCY: ICD-10-CM

## 2019-11-08 PROCEDURE — 80048 BASIC METABOLIC PNL TOTAL CA: CPT | Performed by: FAMILY MEDICINE

## 2019-11-08 PROCEDURE — 36415 COLL VENOUS BLD VENIPUNCTURE: CPT | Performed by: FAMILY MEDICINE

## 2019-11-09 LAB
ANION GAP SERPL CALCULATED.3IONS-SCNC: 12.9 MMOL/L (ref 5–15)
BUN BLD-MCNC: 29 MG/DL (ref 6–20)
BUN/CREAT SERPL: 25.4 (ref 7–25)
CALCIUM SPEC-SCNC: 9.8 MG/DL (ref 8.6–10.5)
CHLORIDE SERPL-SCNC: 106 MMOL/L (ref 98–107)
CO2 SERPL-SCNC: 26.1 MMOL/L (ref 22–29)
CREAT BLD-MCNC: 1.14 MG/DL (ref 0.57–1)
GFR SERPL CREATININE-BSD FRML MDRD: 49 ML/MIN/1.73
GLUCOSE BLD-MCNC: 98 MG/DL (ref 65–99)
POTASSIUM BLD-SCNC: 4.6 MMOL/L (ref 3.5–5.2)
SODIUM BLD-SCNC: 145 MMOL/L (ref 136–145)

## 2019-11-14 ENCOUNTER — TELEPHONE (OUTPATIENT)
Dept: FAMILY MEDICINE CLINIC | Facility: CLINIC | Age: 54
End: 2019-11-14

## 2019-11-14 NOTE — TELEPHONE ENCOUNTER
Pt called stating that she finished the abx and is using flonase, but the sinus pressure is still there and she is not better. Please advise.

## 2019-11-18 DIAGNOSIS — F41.9 CHRONIC ANXIETY: Primary | ICD-10-CM

## 2019-11-18 NOTE — TELEPHONE ENCOUNTER
Fluticasone    Pieter horn    Seen 10/22/19  sched 1/2/20 w/vj 12/11/19 for BMP  Last fasting labs 9/25/19

## 2019-11-19 RX ORDER — FLUTICASONE PROPIONATE 50 MCG
2 SPRAY, SUSPENSION (ML) NASAL DAILY
Qty: 1 BOTTLE | Refills: 4 | Status: SHIPPED | OUTPATIENT
Start: 2019-11-19 | End: 2020-06-01 | Stop reason: SDUPTHER

## 2019-11-25 RX ORDER — PHENTERMINE HYDROCHLORIDE 37.5 MG/1
CAPSULE ORAL
Qty: 30 CAPSULE | Refills: 0 | Status: SHIPPED | OUTPATIENT
Start: 2019-11-25 | End: 2019-12-19

## 2019-12-11 ENCOUNTER — CLINICAL SUPPORT (OUTPATIENT)
Dept: FAMILY MEDICINE CLINIC | Facility: CLINIC | Age: 54
End: 2019-12-11

## 2019-12-11 DIAGNOSIS — Z00.00 ENCOUNTER FOR GENERAL ADULT MEDICAL EXAMINATION WITHOUT ABNORMAL FINDINGS: ICD-10-CM

## 2019-12-11 DIAGNOSIS — I10 ESSENTIAL HYPERTENSION: ICD-10-CM

## 2019-12-11 PROCEDURE — 80048 BASIC METABOLIC PNL TOTAL CA: CPT | Performed by: FAMILY MEDICINE

## 2019-12-11 PROCEDURE — 36415 COLL VENOUS BLD VENIPUNCTURE: CPT | Performed by: FAMILY MEDICINE

## 2019-12-12 LAB
ANION GAP SERPL CALCULATED.3IONS-SCNC: 12 MMOL/L (ref 5–15)
BUN BLD-MCNC: 23 MG/DL (ref 6–20)
BUN/CREAT SERPL: 24.2 (ref 7–25)
CALCIUM SPEC-SCNC: 9.7 MG/DL (ref 8.6–10.5)
CHLORIDE SERPL-SCNC: 104 MMOL/L (ref 98–107)
CO2 SERPL-SCNC: 27 MMOL/L (ref 22–29)
CREAT BLD-MCNC: 0.95 MG/DL (ref 0.57–1)
GFR SERPL CREATININE-BSD FRML MDRD: 61 ML/MIN/1.73
GLUCOSE BLD-MCNC: 112 MG/DL (ref 65–99)
POTASSIUM BLD-SCNC: 4.6 MMOL/L (ref 3.5–5.2)
SODIUM BLD-SCNC: 143 MMOL/L (ref 136–145)

## 2019-12-18 DIAGNOSIS — E66.01 MORBIDLY OBESE (HCC): Primary | ICD-10-CM

## 2019-12-19 RX ORDER — PHENTERMINE HYDROCHLORIDE 37.5 MG/1
CAPSULE ORAL
Qty: 30 CAPSULE | Refills: 0 | Status: SHIPPED | OUTPATIENT
Start: 2019-12-19 | End: 2020-01-21 | Stop reason: SDUPTHER

## 2019-12-19 NOTE — TELEPHONE ENCOUNTER
Last visit: 10/22/19  Next visit: 1/21/2020  Last labs: 12/11/19    Rx requested: phentermine 37.5 MG   Pharmacy:[facility]

## 2019-12-23 ENCOUNTER — OFFICE VISIT (OUTPATIENT)
Dept: FAMILY MEDICINE CLINIC | Facility: CLINIC | Age: 54
End: 2019-12-23

## 2019-12-23 VITALS
RESPIRATION RATE: 18 BRPM | DIASTOLIC BLOOD PRESSURE: 79 MMHG | TEMPERATURE: 98.8 F | BODY MASS INDEX: 35.31 KG/M2 | WEIGHT: 233 LBS | SYSTOLIC BLOOD PRESSURE: 139 MMHG | HEART RATE: 100 BPM | OXYGEN SATURATION: 96 % | HEIGHT: 68 IN

## 2019-12-23 DIAGNOSIS — J45.20 MILD INTERMITTENT ASTHMA WITHOUT COMPLICATION: ICD-10-CM

## 2019-12-23 DIAGNOSIS — J02.8 PHARYNGITIS DUE TO OTHER ORGANISM: Primary | ICD-10-CM

## 2019-12-23 DIAGNOSIS — R50.9 FEVER AND CHILLS: ICD-10-CM

## 2019-12-23 DIAGNOSIS — F17.200 SMOKER: ICD-10-CM

## 2019-12-23 DIAGNOSIS — J10.1 INFLUENZA B: ICD-10-CM

## 2019-12-23 LAB
EXPIRATION DATE: ABNORMAL
FLUAV AG NPH QL: NEGATIVE
FLUBV AG NPH QL: POSITIVE
INTERNAL CONTROL: ABNORMAL
Lab: ABNORMAL

## 2019-12-23 PROCEDURE — 87804 INFLUENZA ASSAY W/OPTIC: CPT | Performed by: FAMILY MEDICINE

## 2019-12-23 PROCEDURE — 99213 OFFICE O/P EST LOW 20 MIN: CPT | Performed by: FAMILY MEDICINE

## 2019-12-23 RX ORDER — CEFDINIR 300 MG/1
300 CAPSULE ORAL 2 TIMES DAILY
Qty: 20 CAPSULE | Refills: 0 | Status: SHIPPED | OUTPATIENT
Start: 2019-12-23 | End: 2020-01-21

## 2019-12-23 RX ORDER — OSELTAMIVIR PHOSPHATE 75 MG/1
75 CAPSULE ORAL 2 TIMES DAILY
Qty: 10 CAPSULE | Refills: 0 | Status: SHIPPED | OUTPATIENT
Start: 2019-12-23 | End: 2020-01-21

## 2019-12-23 NOTE — PATIENT INSTRUCTIONS
"Influenza, Adult  Influenza is also called \"the flu.\" It is an infection in the lungs, nose, and throat (respiratory tract). It is caused by a virus. The flu causes symptoms that are similar to symptoms of a cold. It also causes a high fever and body aches.  The flu spreads easily from person to person (is contagious). Getting a flu shot (influenza vaccination) every year is the best way to prevent the flu.  What are the causes?  This condition is caused by the influenza virus. You can get the virus by:  · Breathing in droplets that are in the air from the cough or sneeze of a person who has the virus.  · Touching something that has the virus on it (is contaminated) and then touching your mouth, nose, or eyes.  What increases the risk?  Certain things may make you more likely to get the flu. These include:  · Not washing your hands often.  · Having close contact with many people during cold and flu season.  · Touching your mouth, eyes, or nose without first washing your hands.  · Not getting a flu shot every year.  You may have a higher risk for the flu, along with serious problems such as a lung infection (pneumonia), if you:  · Are older than 65.  · Are pregnant.  · Have a weakened disease-fighting system (immune system) because of a disease or taking certain medicines.  · Have a long-term (chronic) illness, such as:  ? Heart, kidney, or lung disease.  ? Diabetes.  ? Asthma.  · Have a liver disorder.  · Are very overweight (morbidly obese).  · Have anemia. This is a condition that affects your red blood cells.  What are the signs or symptoms?  Symptoms usually begin suddenly and last 4-14 days. They may include:  · Fever and chills.  · Headaches, body aches, or muscle aches.  · Sore throat.  · Cough.  · Runny or stuffy (congested) nose.  · Chest discomfort.  · Not wanting to eat as much as normal (poor appetite).  · Weakness or feeling tired (fatigue).  · Dizziness.  · Feeling sick to your stomach (nauseous) or " throwing up (vomiting).  How is this treated?  If the flu is found early, you can be treated with medicine that can help reduce how bad the illness is and how long it lasts (antiviral medicine). This may be given by mouth (orally) or through an IV tube.  Taking care of yourself at home can help your symptoms get better. Your doctor may suggest:  · Taking over-the-counter medicines.  · Drinking plenty of fluids.  The flu often goes away on its own. If you have very bad symptoms or other problems, you may be treated in a hospital.  Follow these instructions at home:         Activity  · Rest as needed. Get plenty of sleep.  · Stay home from work or school as told by your doctor.  ? Do not leave home until you do not have a fever for 24 hours without taking medicine.  ? Leave home only to visit your doctor.  Eating and drinking  · Take an ORS (oral rehydration solution). This is a drink that is sold at pharmacies and stores.  · Drink enough fluid to keep your pee (urine) pale yellow.  · Drink clear fluids in small amounts as you are able. Clear fluids include:  ? Water.  ? Ice chips.  ? Fruit juice that has water added (diluted fruit juice).  ? Low-calorie sports drinks.  · Eat bland, easy-to-digest foods in small amounts as you are able. These foods include:  ? Bananas.  ? Applesauce.  ? Rice.  ? Lean meats.  ? Toast.  ? Crackers.  · Do not eat or drink:  ? Fluids that have a lot of sugar or caffeine.  ? Alcohol.  ? Spicy or fatty foods.  General instructions  · Take over-the-counter and prescription medicines only as told by your doctor.  · Use a cool mist humidifier to add moisture to the air in your home. This can make it easier for you to breathe.  · Cover your mouth and nose when you cough or sneeze.  · Wash your hands with soap and water often, especially after you cough or sneeze. If you cannot use soap and water, use alcohol-based hand .  · Keep all follow-up visits as told by your doctor. This is  "important.  How is this prevented?    · Get a flu shot every year. You may get the flu shot in late summer, fall, or winter. Ask your doctor when you should get your flu shot.  · Avoid contact with people who are sick during fall and winter (cold and flu season).  Contact a doctor if:  · You get new symptoms.  · You have:  ? Chest pain.  ? Watery poop (diarrhea).  ? A fever.  · Your cough gets worse.  · You start to have more mucus.  · You feel sick to your stomach.  · You throw up.  Get help right away if you:  · Have shortness of breath.  · Have trouble breathing.  · Have skin or nails that turn a bluish color.  · Have very bad pain or stiffness in your neck.  · Get a sudden headache.  · Get sudden pain in your face or ear.  · Cannot eat or drink without throwing up.  Summary  · Influenza (\"the flu\") is an infection in the lungs, nose, and throat. It is caused by a virus.  · Take over-the-counter and prescription medicines only as told by your doctor.  · Getting a flu shot every year is the best way to avoid getting the flu.  This information is not intended to replace advice given to you by your health care provider. Make sure you discuss any questions you have with your health care provider.  Document Released: 09/26/2009 Document Revised: 06/05/2019 Document Reviewed: 06/05/2019  [employer] Interactive Patient Education © 2019 [employer]    "

## 2019-12-23 NOTE — PROGRESS NOTES
Subjective   Nicki Fraser is a 54 y.o. female.     Chief Complaint   Patient presents with   • Sinusitis       History of Present Illness   54 yr old smoker, hx asthma, COPD does not get  Flu vaccines  C/o sinus pressure, congestion, eye pain, fever, temperature changes, chills, body aches    The following portions of the patient's history were reviewed and updated as appropriate: allergies, current medications, past family history, past medical history, past social history, past surgical history and problem list.    Past Medical History:   Diagnosis Date   • Cervical radiculopathy    • DDD (degenerative disc disease), cervical    • Fibromyalgia    • Herniated disc, cervical     Abstraction from [employer] Herniated Disc; Cervical Degenerated disc.   • Hypertension    • Left shoulder pain    • MAMMO     NEG =    • Migraine headache    • Myalgia    • Peripheral edema        Past Surgical History:   Procedure Laterality Date   •  SECTION     • COLONOSCOPY      COLOGUARD ---NEG = , rech    • LYMPH NODE DISSECTION      cervical node= NEG   • TUBAL ABDOMINAL LIGATION         Family History   Problem Relation Age of Onset   • Heart disease Mother    • Osteoporosis Mother    • Hypertension Father    • Kidney disease Father         KIDNEY STONES   • Stroke Father    • Arthritis Brother    • Diabetes Brother    • Heart disease Brother    • Hypertension Brother        Review of Systems    Objective   Physical Exam   Constitutional: She is oriented to person, place, and time. She appears well-developed and well-nourished. No distress.   Ill-appearing   HENT:   Head: Normocephalic and atraumatic.   Right Ear: External ear normal.   Left Ear: External ear normal.   Nose: Nose normal.   Mouth/Throat: Oropharynx is clear and moist.   Op cobblestoning   Eyes: Pupils are equal, round, and reactive to light. EOM are normal.   Neck: Normal range of motion. Neck supple. No thyromegaly present.    Cardiovascular: Normal rate, regular rhythm and normal heart sounds. Exam reveals no gallop and no friction rub.   No murmur heard.  Pulmonary/Chest: Effort normal and breath sounds normal. She has no wheezes.   Abdominal: Soft. There is no tenderness.   Musculoskeletal: Normal range of motion. She exhibits no edema, tenderness or deformity.   Lymphadenopathy:     She has no cervical adenopathy.   Neurological: She is alert and oriented to person, place, and time. No cranial nerve deficit.   Skin: Skin is warm and dry. No rash noted. She is not diaphoretic.   Psychiatric: She has a normal mood and affect. Her behavior is normal. Judgment and thought content normal.   Nursing note and vitals reviewed.    Vitals:    12/23/19 1325   BP: 139/79   Pulse: 100   Resp: 18   Temp: 98.8 °F (37.1 °C)   SpO2: 96%     Body mass index is 35.43 kg/m².    LDL Cholesterol    Date Value Ref Range Status   09/25/2019 104 (H) 0 - 100 mg/dL Final   09/26/2018 97 0 - 100 mg/dL Final     Results for orders placed or performed in visit on 12/23/19   POC Influenza A / B   Result Value Ref Range    Rapid Influenza A Ag Negative Negative    Rapid Influenza B Ag Positive (A) Negative    Internal Control Passed Passed    Lot Number n/a     Expiration Date n/a    Results for orders placed or performed in visit on 11/18/19   Basic Metabolic Panel   Result Value Ref Range    Glucose 112 (H) 65 - 99 mg/dL    BUN 23 (H) 6 - 20 mg/dL    Creatinine 0.95 0.57 - 1.00 mg/dL    Sodium 143 136 - 145 mmol/L    Potassium 4.6 3.5 - 5.2 mmol/L    Chloride 104 98 - 107 mmol/L    CO2 27.0 22.0 - 29.0 mmol/L    Calcium 9.7 8.6 - 10.5 mg/dL    eGFR Non African Amer 61 >60 mL/min/1.73    BUN/Creatinine Ratio 24.2 7.0 - 25.0    Anion Gap 12.0 5.0 - 15.0 mmol/L   Results for orders placed or performed in visit on 11/08/19   Basic Metabolic Panel   Result Value Ref Range    Glucose 98 65 - 99 mg/dL    BUN 29 (H) 6 - 20 mg/dL    Creatinine 1.14 (H) 0.57 - 1.00 mg/dL     Sodium 145 136 - 145 mmol/L    Potassium 4.6 3.5 - 5.2 mmol/L    Chloride 106 98 - 107 mmol/L    CO2 26.1 22.0 - 29.0 mmol/L    Calcium 9.8 8.6 - 10.5 mg/dL    eGFR Non African Amer 49 (L) >60 mL/min/1.73    BUN/Creatinine Ratio 25.4 (H) 7.0 - 25.0    Anion Gap 12.9 5.0 - 15.0 mmol/L   Results for orders placed or performed in visit on 09/25/19   Lipid Panel   Result Value Ref Range    Total Cholesterol 168 <=200 mg/dL    Triglycerides 49 <=150 mg/dL    HDL Cholesterol 67 >=39 mg/dL    LDL Cholesterol  104 (H) 0 - 100 mg/dL    VLDL Cholesterol 9.8 mg/dL    LDL/HDL Ratio 1.36     Chol/HDL Ratio 2.51    Comprehensive Metabolic Panel   Result Value Ref Range    Glucose 111 (H) 65 - 99 mg/dL    BUN 23 (H) 8 - 20 mg/dL    Creatinine 1.10 (H) 0.40 - 1.00 mg/dL    Sodium 141 136 - 144 mmol/L    Potassium 4.5 3.6 - 5.1 mmol/L    Chloride 106 101 - 111 mmol/L    CO2 25.0 22.0 - 32.0 mmol/L    Calcium 9.3 8.9 - 10.3 mg/dL    Total Protein 6.4 6.1 - 7.9 g/dL    Albumin 3.70 3.50 - 4.80 g/dL    ALT (SGPT) 17 14 - 54 U/L    AST (SGOT) 18 15 - 41 U/L    Alkaline Phosphatase 84 32 - 91 U/L    Total Bilirubin 0.4 0.3 - 1.2 mg/dL    eGFR Non African Amer 52 (L) >60 mL/min/1.73    Globulin 2.7 2.5 - 3.8 gm/dL    A/G Ratio 1.4 1.0 - 1.7 g/dL    BUN/Creatinine Ratio 20.9 5.4 - 26.2    Anion Gap 14.5 5.0 - 15.0 mmol/L   Results for orders placed or performed in visit on 06/28/19   Pap Lb, Rfx HPV ASCU   Result Value Ref Range    Diagnosis Comment (A)     Specimen adequacy: Comment     Clinician Provided ICD-10: Comment     Performed by: Comment     Electronically signed by: Comment     . .     Pathologist Provided ICD-10: Comment     Note: Comment     Conv .conv Comment    HPV High Risk Digene   Result Value Ref Range    HPV, high-risk Negative Negative   Results for orders placed or performed in visit on 06/12/19    PAP SMEAR   Result Value Ref Range     Pap smear See Report     MAMMOGRAPHY   Result Value Ref Range      "Mammogram See Report        Assessment/Plan   Nicki was seen today for sinusitis.    Diagnoses and all orders for this visit:    Pharyngitis due to other organism    Smoker    Mild intermittent asthma without complication    Fever and chills  -     POC Influenza A / B    Influenza B    Other orders  -     cefdinir (OMNICEF) 300 MG capsule; Take 1 capsule by mouth 2 (Two) Times a Day.  -     oseltamivir (TAMIFLU) 75 MG capsule; Take 1 capsule by mouth 2 (Two) Times a Day.        Patient Instructions   Influenza, Adult  Influenza is also called \"the flu.\" It is an infection in the lungs, nose, and throat (respiratory tract). It is caused by a virus. The flu causes symptoms that are similar to symptoms of a cold. It also causes a high fever and body aches.  The flu spreads easily from person to person (is contagious). Getting a flu shot (influenza vaccination) every year is the best way to prevent the flu.  What are the causes?  This condition is caused by the influenza virus. You can get the virus by:  · Breathing in droplets that are in the air from the cough or sneeze of a person who has the virus.  · Touching something that has the virus on it (is contaminated) and then touching your mouth, nose, or eyes.  What increases the risk?  Certain things may make you more likely to get the flu. These include:  · Not washing your hands often.  · Having close contact with many people during cold and flu season.  · Touching your mouth, eyes, or nose without first washing your hands.  · Not getting a flu shot every year.  You may have a higher risk for the flu, along with serious problems such as a lung infection (pneumonia), if you:  · Are older than 65.  · Are pregnant.  · Have a weakened disease-fighting system (immune system) because of a disease or taking certain medicines.  · Have a long-term (chronic) illness, such as:  ? Heart, kidney, or lung disease.  ? Diabetes.  ? Asthma.  · Have a liver disorder.  · Are very " overweight (morbidly obese).  · Have anemia. This is a condition that affects your red blood cells.  What are the signs or symptoms?  Symptoms usually begin suddenly and last 4-14 days. They may include:  · Fever and chills.  · Headaches, body aches, or muscle aches.  · Sore throat.  · Cough.  · Runny or stuffy (congested) nose.  · Chest discomfort.  · Not wanting to eat as much as normal (poor appetite).  · Weakness or feeling tired (fatigue).  · Dizziness.  · Feeling sick to your stomach (nauseous) or throwing up (vomiting).  How is this treated?  If the flu is found early, you can be treated with medicine that can help reduce how bad the illness is and how long it lasts (antiviral medicine). This may be given by mouth (orally) or through an IV tube.  Taking care of yourself at home can help your symptoms get better. Your doctor may suggest:  · Taking over-the-counter medicines.  · Drinking plenty of fluids.  The flu often goes away on its own. If you have very bad symptoms or other problems, you may be treated in a hospital.  Follow these instructions at home:         Activity  · Rest as needed. Get plenty of sleep.  · Stay home from work or school as told by your doctor.  ? Do not leave home until you do not have a fever for 24 hours without taking medicine.  ? Leave home only to visit your doctor.  Eating and drinking  · Take an ORS (oral rehydration solution). This is a drink that is sold at pharmacies and stores.  · Drink enough fluid to keep your pee (urine) pale yellow.  · Drink clear fluids in small amounts as you are able. Clear fluids include:  ? Water.  ? Ice chips.  ? Fruit juice that has water added (diluted fruit juice).  ? Low-calorie sports drinks.  · Eat bland, easy-to-digest foods in small amounts as you are able. These foods include:  ? Bananas.  ? Applesauce.  ? Rice.  ? Lean meats.  ? Toast.  ? Crackers.  · Do not eat or drink:  ? Fluids that have a lot of sugar or  "caffeine.  ? Alcohol.  ? Spicy or fatty foods.  General instructions  · Take over-the-counter and prescription medicines only as told by your doctor.  · Use a cool mist humidifier to add moisture to the air in your home. This can make it easier for you to breathe.  · Cover your mouth and nose when you cough or sneeze.  · Wash your hands with soap and water often, especially after you cough or sneeze. If you cannot use soap and water, use alcohol-based hand .  · Keep all follow-up visits as told by your doctor. This is important.  How is this prevented?    · Get a flu shot every year. You may get the flu shot in late summer, fall, or winter. Ask your doctor when you should get your flu shot.  · Avoid contact with people who are sick during fall and winter (cold and flu season).  Contact a doctor if:  · You get new symptoms.  · You have:  ? Chest pain.  ? Watery poop (diarrhea).  ? A fever.  · Your cough gets worse.  · You start to have more mucus.  · You feel sick to your stomach.  · You throw up.  Get help right away if you:  · Have shortness of breath.  · Have trouble breathing.  · Have skin or nails that turn a bluish color.  · Have very bad pain or stiffness in your neck.  · Get a sudden headache.  · Get sudden pain in your face or ear.  · Cannot eat or drink without throwing up.  Summary  · Influenza (\"the flu\") is an infection in the lungs, nose, and throat. It is caused by a virus.  · Take over-the-counter and prescription medicines only as told by your doctor.  · Getting a flu shot every year is the best way to avoid getting the flu.  This information is not intended to replace advice given to you by your health care provider. Make sure you discuss any questions you have with your health care provider.  Document Released: 09/26/2009 Document Revised: 06/05/2019 Document Reviewed: 06/05/2019  [employer] Interactive Patient Education © 2019 [employer]             "

## 2020-01-16 ENCOUNTER — TELEPHONE (OUTPATIENT)
Dept: FAMILY MEDICINE CLINIC | Facility: CLINIC | Age: 55
End: 2020-01-16

## 2020-01-16 RX ORDER — LISINOPRIL 10 MG/1
10 TABLET ORAL DAILY
Qty: 30 TABLET | Refills: 0 | Status: SHIPPED | OUTPATIENT
Start: 2020-01-16 | End: 2020-01-21

## 2020-01-17 RX ORDER — LISINOPRIL 10 MG/1
TABLET ORAL
Qty: 90 TABLET | Refills: 0 | OUTPATIENT
Start: 2020-01-17

## 2020-01-21 ENCOUNTER — OFFICE VISIT (OUTPATIENT)
Dept: FAMILY MEDICINE CLINIC | Facility: CLINIC | Age: 55
End: 2020-01-21

## 2020-01-21 VITALS
HEIGHT: 68 IN | RESPIRATION RATE: 20 BRPM | HEART RATE: 91 BPM | BODY MASS INDEX: 36.16 KG/M2 | DIASTOLIC BLOOD PRESSURE: 85 MMHG | SYSTOLIC BLOOD PRESSURE: 122 MMHG | TEMPERATURE: 98.3 F | WEIGHT: 238.6 LBS | OXYGEN SATURATION: 99 %

## 2020-01-21 DIAGNOSIS — I10 ESSENTIAL HYPERTENSION: Primary | ICD-10-CM

## 2020-01-21 DIAGNOSIS — J44.9 ASTHMA WITH COPD (HCC): ICD-10-CM

## 2020-01-21 DIAGNOSIS — E66.01 MORBIDLY OBESE (HCC): ICD-10-CM

## 2020-01-21 DIAGNOSIS — G89.29 CHRONIC MIDLINE THORACIC BACK PAIN: ICD-10-CM

## 2020-01-21 DIAGNOSIS — M54.6 CHRONIC MIDLINE THORACIC BACK PAIN: ICD-10-CM

## 2020-01-21 PROCEDURE — 99214 OFFICE O/P EST MOD 30 MIN: CPT | Performed by: FAMILY MEDICINE

## 2020-01-21 RX ORDER — FUROSEMIDE 20 MG/1
20 TABLET ORAL DAILY PRN
Start: 2020-01-21 | End: 2020-09-11

## 2020-01-21 RX ORDER — EPINEPHRINE 0.3 MG/.3ML
INJECTION SUBCUTANEOUS AS NEEDED
COMMUNITY
Start: 2019-10-22 | End: 2020-09-11 | Stop reason: SDUPTHER

## 2020-01-21 RX ORDER — LISINOPRIL 10 MG/1
10 TABLET ORAL DAILY
Qty: 30 TABLET | Refills: 3 | Status: SHIPPED | OUTPATIENT
Start: 2020-01-21 | End: 2020-04-21

## 2020-01-21 RX ORDER — TOPIRAMATE 50 MG/1
50 TABLET, FILM COATED ORAL 2 TIMES DAILY
Qty: 60 TABLET | Refills: 2 | Status: SHIPPED | OUTPATIENT
Start: 2020-01-21 | End: 2020-06-04 | Stop reason: SDUPTHER

## 2020-01-21 RX ORDER — FUROSEMIDE 20 MG/1
20 TABLET ORAL DAILY
COMMUNITY
Start: 2020-01-12 | End: 2020-01-21

## 2020-01-21 RX ORDER — LISINOPRIL 10 MG/1
10 TABLET ORAL DAILY
Qty: 30 TABLET | Refills: 0
Start: 2020-01-21 | End: 2020-01-21

## 2020-01-21 RX ORDER — CYCLOBENZAPRINE HCL 10 MG
10 TABLET ORAL NIGHTLY PRN
Qty: 30 TABLET | Refills: 0 | Status: SHIPPED | OUTPATIENT
Start: 2020-01-21 | End: 2020-02-21 | Stop reason: SDUPTHER

## 2020-01-21 RX ORDER — PHENTERMINE HYDROCHLORIDE 37.5 MG/1
37.5 CAPSULE ORAL EVERY MORNING
Qty: 30 CAPSULE | Refills: 0 | Status: SHIPPED | OUTPATIENT
Start: 2020-01-21 | End: 2020-02-18 | Stop reason: SDUPTHER

## 2020-01-21 RX ORDER — ALBUTEROL SULFATE 90 MCG
2 HFA AEROSOL WITH ADAPTER (GRAM) INHALATION EVERY 4 HOURS PRN
Qty: 18 INHALER | Refills: 0 | Status: SHIPPED | OUTPATIENT
Start: 2020-01-21 | End: 2020-02-18 | Stop reason: SDUPTHER

## 2020-01-21 NOTE — PROGRESS NOTES
Subjective   Nicki Fraser is a 54 y.o. female.     Chief Complaint   Patient presents with   • Hypertension     fluid retention   • Obesity     3 mon   • Pain         Current Outpatient Medications:   •  aspirin 325 MG tablet, Take 325 mg by mouth Daily., Disp: , Rfl:   •  Cholecalciferol (EQL VITAMIN D3) 2000 units capsule, Take 1 capsule by mouth Daily., Disp: , Rfl:   •  dextromethorphan-guaifenesin (MUCINEX DM MAX STRENGTH)  MG per 12 hr tablet, Take 1 tablet by mouth 2 (Two) Times a Day As Needed., Disp: , Rfl:   •  diclofenac (VOLTAREN) 1 % gel gel, Apply 4 g topically to the appropriate area as directed 4 (Four) Times a Day As Needed (joint pain)., Disp: 5 tube, Rfl: 5  •  EPINEPHrine (EPIPEN) 0.3 MG/0.3ML solution auto-injector injection, As Needed., Disp: , Rfl:   •  fluticasone (FLONASE) 50 MCG/ACT nasal spray, 2 sprays into the nostril(s) as directed by provider Daily., Disp: 1 bottle, Rfl: 4  •  HYDROcodone-acetaminophen (NORCO)  MG per tablet, Take 1 tablet by mouth Every 8 (Eight) Hours As Needed for Moderate Pain ., Disp: 90 tablet, Rfl: 0  •  ibuprofen (ADVIL,MOTRIN) 800 MG tablet, Take 1 tablet by mouth 4 (Four) Times a Day As Needed., Disp: , Rfl:   •  lisinopril (PRINIVIL,ZESTRIL) 10 MG tablet, Take 1 tablet by mouth Daily., Disp: 30 tablet, Rfl: 3  •  loratadine (CLARITIN) 10 MG tablet, Take 10 mg by mouth Daily., Disp: , Rfl:   •  montelukast (SINGULAIR) 10 MG tablet, Take 1 tablet by mouth Daily., Disp: 90 tablet, Rfl: 1  •  phentermine 37.5 MG capsule, Take 1 capsule by mouth Every Morning., Disp: 30 capsule, Rfl: 0  •  polyethylene glycol (MIRALAX) powder, Take 17 g by mouth Daily., Disp: 500 g, Rfl: 4  •  topiramate (TOPAMAX) 50 MG tablet, Take 1 tablet by mouth 2 (Two) Times a Day., Disp: 60 tablet, Rfl: 2  •  cyclobenzaprine (FLEXERIL) 10 MG tablet, Take 1 tablet by mouth At Night As Needed for Muscle Spasms., Disp: 30 tablet, Rfl: 0  •  furosemide (LASIX) 20 MG  tablet, Take 1 tablet by mouth Daily As Needed (swelling)., Disp: , Rfl:   •  mometasone-formoterol (DULERA) 200-5 MCG/ACT inhaler, Inhale 2 puffs 2 (Two) Times a Day. Rinse mouth bid after using, Disp: 13 g, Rfl: 0  •  PROVENTIL  (90 Base) MCG/ACT inhaler, Inhale 2 puffs Every 4 (Four) Hours As Needed for Wheezing., Disp: 18 inhaler, Rfl: 0    Past Medical History:   Diagnosis Date   • Cervical radiculopathy    • DDD (degenerative disc disease), cervical    • Fibromyalgia    • Herniated disc, cervical     Abstraction from [facility] Herniated Disc; Cervical Degenerated disc.   • Hypertension    • Left shoulder pain    • MAMMO     NEG = 2019   • Migraine headache    • Myalgia    • Peripheral edema        Past Surgical History:   Procedure Laterality Date   •  SECTION     • COLONOSCOPY      COLOGUARD ---NEG = , rech    • LYMPH NODE DISSECTION      cervical node= NEG   • TUBAL ABDOMINAL LIGATION         Family History   Problem Relation Age of Onset   • Heart disease Mother    • Osteoporosis Mother    • Hypertension Father    • Kidney disease Father         KIDNEY STONES   • Stroke Father    • Arthritis Brother    • Diabetes Brother    • Heart disease Brother    • Hypertension Brother        Social History     Socioeconomic History   • Marital status:      Spouse name: Not on file   • Number of children: Not on file   • Years of education: Not on file   • Highest education level: Not on file   Tobacco Use   • Smoking status: Current Every Day Smoker     Packs/day: 1.00     Years: 30.00     Pack years: 30.00     Types: Cigarettes   • Smokeless tobacco: Never Used   Substance and Sexual Activity   • Alcohol use: Yes     Comment: occ   • Drug use: No   • Sexual activity: Never       53y/o C female here for f/u on HTN/ Obesity/ COPD    Pt states she had Flu A on Xmas as well as a sinus and ear infection......doing some better overall but feels the Proventil HFA works better than the Ventolin  one and wanting to see about getting this    Pt also states since BP med switch from water pills, she is staying swollen-----mostly in her hands    Pt also having more mid back pain and wanting to go back to water therapy in [place]---it seemed to help before       The following portions of the patient's history were reviewed and updated as appropriate: allergies, current medications, past family history, past medical history, past social history, past surgical history and problem list.    Review of Systems   Constitutional: Positive for activity change, fatigue and unexpected weight gain.   Respiratory: Negative for cough.    Cardiovascular: Negative for leg swelling.   Musculoskeletal: Positive for arthralgias, back pain (thoracic), joint swelling (hands swelling) and myalgias.   Neurological: Negative for weakness and light-headedness.   Psychiatric/Behavioral: Positive for sleep disturbance.       Vitals:    01/21/20 1054   BP: 122/85   Pulse: 91   Resp: 20   Temp: 98.3 °F (36.8 °C)   SpO2: 99%       Objective   Physical Exam   Constitutional: She is oriented to person, place, and time. She appears well-developed and well-nourished. She is obese.  HENT:   Head: Normocephalic and atraumatic.   Cardiovascular:   No murmur heard.  Pulmonary/Chest: No respiratory distress.   Musculoskeletal: She exhibits no edema.        Right ankle: She exhibits no swelling.        Left ankle: She exhibits no swelling.   Neurological: She is alert and oriented to person, place, and time. No cranial nerve deficit.   Skin: Skin is warm and dry. No rash noted.   Psychiatric: She has a normal mood and affect. Her behavior is normal. Judgment and thought content normal.   Nursing note and vitals reviewed.        Assessment/Plan   Nicki was seen today for hypertension, obesity and pain.    Diagnoses and all orders for this visit:    Essential hypertension    Chronic midline thoracic back pain  -     Ambulatory Referral to Physical  Therapy Aqu[facility]    Asthma with COPD (CMS/HCC)    Morbidly obese (CMS/HCC)  -     phentermine 37.5 MG capsule; Take 1 capsule by mouth Every Morning.    Other orders  -     Discontinue: lisinopril (PRINIVIL,ZESTRIL) 10 MG tablet; Take 1 tablet by mouth Daily.  -     furosemide (LASIX) 20 MG tablet; Take 1 tablet by mouth Daily As Needed (swelling).  -     topiramate (TOPAMAX) 50 MG tablet; Take 1 tablet by mouth 2 (Two) Times a Day.  -     cyclobenzaprine (FLEXERIL) 10 MG tablet; Take 1 tablet by mouth At Night As Needed for Muscle Spasms.  -     lisinopril (PRINIVIL,ZESTRIL) 10 MG tablet; Take 1 tablet by mouth Daily.  -     PROVENTIL  (90 Base) MCG/ACT inhaler; Inhale 2 puffs Every 4 (Four) Hours As Needed for Wheezing.  -     mometasone-formoterol (DULERA) 200-5 MCG/ACT inhaler; Inhale 2 puffs 2 (Two) Times a Day. Rinse mouth bid after using    Trial of Dulera qday and see if improves resp  Can try 10-20 mg lasix prn swelling but not every day  Trial of Flexeril at nite and water therapy for improved pain and increase activity

## 2020-02-04 ENCOUNTER — OFFICE VISIT (OUTPATIENT)
Dept: PAIN MEDICINE | Facility: CLINIC | Age: 55
End: 2020-02-04

## 2020-02-04 VITALS
HEIGHT: 68 IN | SYSTOLIC BLOOD PRESSURE: 138 MMHG | WEIGHT: 235 LBS | BODY MASS INDEX: 35.61 KG/M2 | DIASTOLIC BLOOD PRESSURE: 82 MMHG | RESPIRATION RATE: 16 BRPM | OXYGEN SATURATION: 96 % | HEART RATE: 69 BPM | TEMPERATURE: 97.8 F

## 2020-02-04 DIAGNOSIS — M79.10 MYALGIA: ICD-10-CM

## 2020-02-04 DIAGNOSIS — G89.29 CHRONIC BILATERAL LOW BACK PAIN WITH SCIATICA, SCIATICA LATERALITY UNSPECIFIED: ICD-10-CM

## 2020-02-04 DIAGNOSIS — M54.12 CERVICAL RADICULOPATHY: Primary | ICD-10-CM

## 2020-02-04 DIAGNOSIS — M25.50 ARTHRALGIA, UNSPECIFIED JOINT: ICD-10-CM

## 2020-02-04 DIAGNOSIS — M54.40 CHRONIC BILATERAL LOW BACK PAIN WITH SCIATICA, SCIATICA LATERALITY UNSPECIFIED: ICD-10-CM

## 2020-02-04 DIAGNOSIS — G89.29 CHRONIC BILATERAL THORACIC BACK PAIN: ICD-10-CM

## 2020-02-04 DIAGNOSIS — G89.4 CHRONIC PAIN DISORDER: ICD-10-CM

## 2020-02-04 DIAGNOSIS — M79.7 FIBROMYALGIA: ICD-10-CM

## 2020-02-04 DIAGNOSIS — M54.6 CHRONIC BILATERAL THORACIC BACK PAIN: ICD-10-CM

## 2020-02-04 DIAGNOSIS — M48.02 CERVICAL SPINAL STENOSIS: ICD-10-CM

## 2020-02-04 PROCEDURE — 99213 OFFICE O/P EST LOW 20 MIN: CPT | Performed by: PHYSICAL MEDICINE & REHABILITATION

## 2020-02-04 PROCEDURE — G0463 HOSPITAL OUTPT CLINIC VISIT: HCPCS | Performed by: PHYSICAL MEDICINE & REHABILITATION

## 2020-02-04 RX ORDER — HYDROCODONE BITARTRATE AND ACETAMINOPHEN 10; 325 MG/1; MG/1
1 TABLET ORAL EVERY 8 HOURS PRN
Qty: 90 TABLET | Refills: 0 | Status: SHIPPED | OUTPATIENT
Start: 2020-02-04 | End: 2020-04-30 | Stop reason: SDUPTHER

## 2020-02-04 RX ORDER — HYDROCODONE BITARTRATE AND ACETAMINOPHEN 10; 325 MG/1; MG/1
1 TABLET ORAL EVERY 8 HOURS PRN
Qty: 90 TABLET | Refills: 0 | Status: SHIPPED | OUTPATIENT
Start: 2020-02-04 | End: 2020-02-04 | Stop reason: SDUPTHER

## 2020-02-04 NOTE — PROGRESS NOTES
Subjective   Nicki Fraser is a 54 y.o. female.     Neck and back pain, 10/10 at worst, 7/10 at best, always present, varies, aching, stabbing, worse with bending and lifting, interferes with ADLs, sleep, activity, failed PT, chiropractor, meds. MRI C-spine with DDD, stenosis. Saw Dr. Overton, had PT, given Norco 10mg TID prn, began receiving opioids from PCP, down to BID with less relief, PCP retiring, increased here to TID prn. Failed injections, surgery in past. Back pain worse after housework, no b/b incontinence, no new weakness or numbness. Worsening b/l thumb and great toe pain.       The following portions of the patient's history were reviewed and updated as appropriate: allergies, current medications, past family history, past medical history, past social history, past surgical history and problem list.    Review of Systems   Constitutional: Positive for fatigue. Negative for chills and fever.   HENT: Negative for hearing loss and trouble swallowing.    Eyes: Negative for visual disturbance.   Respiratory: Negative for shortness of breath.    Cardiovascular: Negative for chest pain.   Gastrointestinal: Positive for constipation and nausea. Negative for abdominal pain, diarrhea and vomiting.   Genitourinary: Negative for urinary incontinence.   Musculoskeletal: Positive for arthralgias, back pain, joint swelling and neck pain. Negative for myalgias.   Neurological: Positive for dizziness and headache. Negative for weakness and numbness.       Objective   Physical Exam   Constitutional: She is oriented to person, place, and time. She appears well-developed and well-nourished.   HENT:   Head: Normocephalic and atraumatic.   Eyes: Pupils are equal, round, and reactive to light. EOM are normal.   Neck: Normal range of motion.   Cardiovascular: Normal rate, regular rhythm, normal heart sounds and intact distal pulses.   Pulmonary/Chest: Breath sounds normal.   Abdominal: Soft. Bowel sounds are normal.  She exhibits no distension. There is no tenderness.   Neurological: She is alert and oriented to person, place, and time. She has normal strength and normal reflexes. She displays normal reflexes. No sensory deficit.   Psychiatric: She has a normal mood and affect. Her behavior is normal. Thought content normal.         Assessment/Plan   Nicki was seen today for back pain.    Diagnoses and all orders for this visit:    Cervical radiculopathy    Arthralgia, unspecified joint    Chronic bilateral low back pain with sciatica, sciatica laterality unspecified    Chronic bilateral thoracic back pain    Chronic pain disorder    Fibromyalgia    Myalgia    Cervical spinal stenosis      Inspect reviewed, in order. UDS in order 7/2/19.  Treatment plan will consist of continuing current medication as long as it remains effective and is necessary, while evaluating patient at each visit and determining if the medication can be lowered or discontinued, while also using nonopioid therapies to reduce reliance on opioids.  Increased to Norco 10mg TID prn, continue.  Begin Tizanidine 4mg TID prn.  Failed injections, surgery.  Insurance will not cover comp cream. Begin Voltaren.  Pt will ask PCP for referral to allergist for possible bee sting allergy.  RTC 3 months for f/u.

## 2020-02-18 DIAGNOSIS — E66.01 MORBIDLY OBESE (HCC): ICD-10-CM

## 2020-02-18 RX ORDER — PHENTERMINE HYDROCHLORIDE 37.5 MG/1
37.5 CAPSULE ORAL EVERY MORNING
Qty: 30 CAPSULE | Refills: 0 | Status: SHIPPED | OUTPATIENT
Start: 2020-02-18 | End: 2020-03-19 | Stop reason: SDUPTHER

## 2020-02-18 RX ORDER — ALBUTEROL SULFATE 90 UG/1
AEROSOL, METERED RESPIRATORY (INHALATION)
Qty: 18 G | Refills: 0 | OUTPATIENT
Start: 2020-02-18

## 2020-02-18 RX ORDER — ALBUTEROL SULFATE 90 MCG
2 HFA AEROSOL WITH ADAPTER (GRAM) INHALATION EVERY 4 HOURS PRN
Qty: 18 INHALER | Refills: 0 | Status: SHIPPED | OUTPATIENT
Start: 2020-02-18 | End: 2020-02-19

## 2020-02-18 RX ORDER — PHENTERMINE HYDROCHLORIDE 37.5 MG/1
CAPSULE ORAL
Qty: 30 CAPSULE | Refills: 0 | OUTPATIENT
Start: 2020-02-18

## 2020-02-18 NOTE — TELEPHONE ENCOUNTER
Last visit:1/21/19   Next visit:4/21/20  Last labs: 12/11/19    Rx requested:phentermine 37.5 MG & Ventolin  Ph[facility]acy: [facility] [place]

## 2020-02-19 RX ORDER — ALBUTEROL SULFATE 90 UG/1
2 AEROSOL, METERED RESPIRATORY (INHALATION) EVERY 4 HOURS PRN
Qty: 18 G | Refills: 0 | Status: SHIPPED | OUTPATIENT
Start: 2020-02-19 | End: 2020-04-21 | Stop reason: SDUPTHER

## 2020-02-19 NOTE — TELEPHONE ENCOUNTER
Pharmacy called and said Insurance will not cover the Proventil.  It will however cover Ventolin so he is asking if you can change the RX to ventolin brand

## 2020-02-20 NOTE — TELEPHONE ENCOUNTER
Last visit:  1/21/20  Next visit: 4/21/20  Last labs:12/11/19    Rx requested:cyclobenzaprine 10 MG   Ph[facility]acy: [facility] [place]

## 2020-02-21 RX ORDER — CYCLOBENZAPRINE HCL 10 MG
10 TABLET ORAL NIGHTLY PRN
Qty: 30 TABLET | Refills: 0 | Status: SHIPPED | OUTPATIENT
Start: 2020-02-21 | End: 2020-05-01

## 2020-02-21 RX ORDER — ALBUTEROL SULFATE 90 UG/1
AEROSOL, METERED RESPIRATORY (INHALATION)
Qty: 18 G | Refills: 0 | OUTPATIENT
Start: 2020-02-21

## 2020-02-21 RX ORDER — CYCLOBENZAPRINE HCL 10 MG
TABLET ORAL
Qty: 30 TABLET | Refills: 0 | OUTPATIENT
Start: 2020-02-21

## 2020-03-19 DIAGNOSIS — E66.01 MORBIDLY OBESE (HCC): ICD-10-CM

## 2020-03-19 RX ORDER — AMOXICILLIN 875 MG/1
TABLET, COATED ORAL
Qty: 20 TABLET | Refills: 0 | OUTPATIENT
Start: 2020-03-19

## 2020-03-19 RX ORDER — PHENTERMINE HYDROCHLORIDE 37.5 MG/1
CAPSULE ORAL
Qty: 30 CAPSULE | Refills: 0 | OUTPATIENT
Start: 2020-03-19

## 2020-03-19 RX ORDER — PHENTERMINE HYDROCHLORIDE 37.5 MG/1
37.5 CAPSULE ORAL EVERY MORNING
Qty: 30 CAPSULE | Refills: 0 | Status: SHIPPED | OUTPATIENT
Start: 2020-03-19 | End: 2020-04-21 | Stop reason: SDUPTHER

## 2020-03-19 RX ORDER — ALBUTEROL SULFATE 90 UG/1
AEROSOL, METERED RESPIRATORY (INHALATION)
Qty: 18 G | Refills: 0 | OUTPATIENT
Start: 2020-03-19

## 2020-03-19 NOTE — TELEPHONE ENCOUNTER
Last visit:  1/21/20  Next visit: 4/21/20  Last labs: 12/11/19    Rx requested: Albuterol,Amoxicillin,Phentermine  Pharmacy: [facility] [place]

## 2020-03-20 RX ORDER — IBUPROFEN 800 MG/1
800 TABLET ORAL 4 TIMES DAILY PRN
OUTPATIENT
Start: 2020-03-20

## 2020-03-20 RX ORDER — IBUPROFEN 800 MG/1
800 TABLET ORAL 4 TIMES DAILY PRN
Qty: 120 TABLET | Refills: 0 | Status: SHIPPED | OUTPATIENT
Start: 2020-03-20 | End: 2020-04-17

## 2020-04-13 RX ORDER — AMOXICILLIN 875 MG/1
TABLET, COATED ORAL
Qty: 20 TABLET | Refills: 0 | OUTPATIENT
Start: 2020-04-13

## 2020-04-13 RX ORDER — ALBUTEROL SULFATE 90 UG/1
AEROSOL, METERED RESPIRATORY (INHALATION)
Qty: 18 G | Refills: 0 | OUTPATIENT
Start: 2020-04-13

## 2020-04-13 NOTE — TELEPHONE ENCOUNTER
Last visit: 1/21/20  Next visit: 4/21/20  Last labs: 12/11/19    Rx requested:Ventolin HFA   Ph[facility]acy:[facility] [place]

## 2020-04-17 RX ORDER — IBUPROFEN 800 MG/1
TABLET ORAL
Qty: 120 TABLET | Refills: 0 | Status: SHIPPED | OUTPATIENT
Start: 2020-04-17 | End: 2020-06-01 | Stop reason: SDUPTHER

## 2020-04-21 ENCOUNTER — OFFICE VISIT (OUTPATIENT)
Dept: FAMILY MEDICINE CLINIC | Facility: CLINIC | Age: 55
End: 2020-04-21

## 2020-04-21 VITALS — HEART RATE: 85 BPM | DIASTOLIC BLOOD PRESSURE: 81 MMHG | SYSTOLIC BLOOD PRESSURE: 137 MMHG

## 2020-04-21 DIAGNOSIS — I10 ESSENTIAL HYPERTENSION: ICD-10-CM

## 2020-04-21 DIAGNOSIS — R05.3 PERSISTENT COUGH FOR 3 WEEKS OR LONGER: Primary | ICD-10-CM

## 2020-04-21 DIAGNOSIS — E66.01 MORBIDLY OBESE (HCC): ICD-10-CM

## 2020-04-21 DIAGNOSIS — J30.2 SEASONAL ALLERGIC RHINITIS, UNSPECIFIED TRIGGER: ICD-10-CM

## 2020-04-21 DIAGNOSIS — J44.9 ASTHMA WITH COPD (HCC): ICD-10-CM

## 2020-04-21 PROBLEM — J20.9 ACUTE BRONCHITIS: Status: RESOLVED | Noted: 2019-01-08 | Resolved: 2020-04-21

## 2020-04-21 PROCEDURE — 99442 PR PHYS/QHP TELEPHONE EVALUATION 11-20 MIN: CPT | Performed by: FAMILY MEDICINE

## 2020-04-21 RX ORDER — LOSARTAN POTASSIUM 50 MG/1
50 TABLET ORAL DAILY
Qty: 30 TABLET | Refills: 1 | Status: SHIPPED | OUTPATIENT
Start: 2020-04-21 | End: 2020-06-04

## 2020-04-21 RX ORDER — ALBUTEROL SULFATE 90 UG/1
2 AEROSOL, METERED RESPIRATORY (INHALATION) EVERY 4 HOURS PRN
Qty: 18 G | Refills: 0 | Status: SHIPPED | OUTPATIENT
Start: 2020-04-21 | End: 2021-04-08

## 2020-04-21 RX ORDER — AMOXICILLIN 875 MG/1
875 TABLET, COATED ORAL 2 TIMES DAILY
Qty: 20 TABLET | Refills: 0 | Status: SHIPPED | OUTPATIENT
Start: 2020-04-21 | End: 2020-05-18

## 2020-04-21 RX ORDER — PHENTERMINE HYDROCHLORIDE 37.5 MG/1
37.5 CAPSULE ORAL EVERY MORNING
Qty: 30 CAPSULE | Refills: 0 | Status: SHIPPED | OUTPATIENT
Start: 2020-04-21 | End: 2020-06-04 | Stop reason: SDUPTHER

## 2020-04-21 RX ORDER — ALBUTEROL SULFATE 2.5 MG/3ML
2.5 SOLUTION RESPIRATORY (INHALATION) EVERY 4 HOURS PRN
COMMUNITY

## 2020-04-21 RX ORDER — AZELASTINE HCL 205.5 UG/1
2 SPRAY NASAL 2 TIMES DAILY
Qty: 30 ML | Refills: 0 | Status: SHIPPED | OUTPATIENT
Start: 2020-04-21 | End: 2020-05-18

## 2020-04-21 NOTE — PROGRESS NOTES
Subjective   Nicki Fraser is a 54 y.o. female.     Chief Complaint   Patient presents with   • Allergic Rhinitis   • Obesity   • Hypertension         Current Outpatient Medications:   •  albuterol (PROVENTIL) (2.5 MG/3ML) 0.083% nebulizer solution, Take 2.5 mg by nebulization Every 4 (Four) Hours As Needed for Wheezing., Disp: , Rfl:   •  albuterol sulfate  (90 Base) MCG/ACT inhaler, Inhale 2 puffs Every 4 (Four) Hours As Needed for Wheezing., Disp: 18 g, Rfl: 0  •  aspirin 325 MG tablet, Take 325 mg by mouth Daily., Disp: , Rfl:   •  Cholecalciferol (EQL VITAMIN D3) 2000 units capsule, Take 1 capsule by mouth Daily., Disp: , Rfl:   •  cyclobenzaprine (FLEXERIL) 10 MG tablet, Take 1 tablet by mouth At Night As Needed for Muscle Spasms., Disp: 30 tablet, Rfl: 0  •  dextromethorphan-guaifenesin (MUCINEX DM MAX STRENGTH)  MG per 12 hr tablet, Take 1 tablet by mouth 2 (Two) Times a Day As Needed., Disp: , Rfl:   •  diclofenac (VOLTAREN) 1 % gel gel, Apply 4 g topically to the appropriate area as directed 4 (Four) Times a Day As Needed (joint pain)., Disp: 5 tube, Rfl: 5  •  EPINEPHrine (EPIPEN) 0.3 MG/0.3ML solution auto-injector injection, As Needed., Disp: , Rfl:   •  fluticasone (FLONASE) 50 MCG/ACT nasal spray, 2 sprays into the nostril(s) as directed by provider Daily., Disp: 1 bottle, Rfl: 4  •  furosemide (LASIX) 20 MG tablet, Take 1 tablet by mouth Daily As Needed (swelling). (Patient taking differently: Take 20 mg by mouth Every Other Day.), Disp: , Rfl:   •  HYDROcodone-acetaminophen (NORCO)  MG per tablet, Take 1 tablet by mouth Every 8 (Eight) Hours As Needed for Moderate Pain ., Disp: 90 tablet, Rfl: 0  •  ibuprofen (ADVIL,MOTRIN) 800 MG tablet, TAKE ONE TABLET BY MOUTH FOUR TIMES A DAY AS NEEDED FOR MODERATE PAIN, Disp: 120 tablet, Rfl: 0  •  loratadine (CLARITIN) 10 MG tablet, Take 10 mg by mouth Daily., Disp: , Rfl:   •  montelukast (SINGULAIR) 10 MG tablet, Take 1 tablet  by mouth Daily., Disp: 90 tablet, Rfl: 1  •  phentermine 37.5 MG capsule, Take 1 capsule by mouth Every Morning., Disp: 30 capsule, Rfl: 0  •  polyethylene glycol (MIRALAX) powder, Take 17 g by mouth Daily., Disp: 500 g, Rfl: 4  •  topiramate (TOPAMAX) 50 MG tablet, Take 1 tablet by mouth 2 (Two) Times a Day. (Patient taking differently: Take 50 mg by mouth Daily.), Disp: 60 tablet, Rfl: 2  •  amoxicillin (AMOXIL) 875 MG tablet, Take 1 tablet by mouth 2 (Two) Times a Day., Disp: 20 tablet, Rfl: 0  •  azelastine (ASTEPRO) 0.15 % solution nasal spray, 2 sprays into the nostril(s) as directed by provider 2 (Two) Times a Day., Disp: 30 mL, Rfl: 0  •  losartan (COZAAR) 50 MG tablet, Take 1 tablet by mouth Daily., Disp: 30 tablet, Rfl: 1    Past Medical History:   Diagnosis Date   • Allergic    • Cervical radiculopathy    • DDD (degenerative disc disease), cervical    • Fibromyalgia    • Herniated disc, cervical     Abstraction from [facility] Herniated Disc; Cervical Degenerated disc.   • Hypertension    • Left shoulder pain    • MAMMO     NEG =    • Migraine headache    • Myalgia    • Peripheral edema        Past Surgical History:   Procedure Laterality Date   •  SECTION     • COLONOSCOPY      COLOGUARD ---NEG = , rech    • LYMPH NODE DISSECTION      cervical node= NEG   • TUBAL ABDOMINAL LIGATION         Family History   Problem Relation Age of Onset   • Heart disease Mother    • Osteoporosis Mother    • Hypertension Father    • Kidney disease Father         KIDNEY STONES   • Stroke Father    • Arthritis Brother    • Diabetes Brother    • Heart disease Brother    • Hypertension Brother        Social History     Socioeconomic History   • Marital status:      Spouse name: Not on file   • Number of children: Not on file   • Years of education: Not on file   • Highest education level: Not on file   Tobacco Use   • Smoking status: Current Every Day Smoker     Packs/day: 1.00     Years: 3000      Pack years: 30.00     Types: Cigarettes   • Smokeless tobacco: Never Used   Substance and Sexual Activity   • Alcohol use: Yes     Comment: occ   • Drug use: No   • Sexual activity: Never       54 y/oC female here for tele visit due to persistent cough/ needing refill of the phentermine/ HTN    Pt w/ c/o's pain @ her forehead/ PND/ cough....denies fever and admits she tried mult otc txs x 3 weeks but not getting any better----Pt states she is taking claritin/ mucinex and flonase qday---->>>admits she sprays the flonase high in the nose;  States she has tried Allegra/ Zyrtec and benadryl w/o success in the past; States she usu has this every spring and worried it may go into her lungs----pt admits she is still smoking 1ppd but has hx/o asthma and using her rescue HFA more in the past few weeks than she has in the past years    Pt states she has had a daily tickle cough before the allergy symptoms started and now doing both dry and wet coughing    Pt states she is trying to walk about a mile each day and watching her diet but doesn't feel she is losing wt----due for a refill too; admits she can only take the Topamax qam because it makes her foggy to take a second dose       The following portions of the patient's history were reviewed and updated as appropriate: allergies, current medications, past family history, past medical history, past social history, past surgical history and problem list.    Review of Systems   Constitutional: Positive for activity change and appetite change (dietary changes). Negative for chills, fatigue, fever, unexpected weight gain and unexpected weight loss.   HENT: Positive for congestion, postnasal drip, sinus pressure and sneezing. Negative for ear discharge, ear pain, rhinorrhea, sore throat and swollen glands.    Eyes: Negative for pain, discharge, redness, itching and visual disturbance.   Respiratory: Positive for cough and wheezing. Negative for shortness of breath and stridor.     Cardiovascular: Negative for chest pain and leg swelling.   Skin: Negative for rash.   Allergic/Immunologic: Negative for environmental allergies.   Psychiatric/Behavioral: Negative for sleep disturbance.       Vitals:    04/21/20 1043   BP: 137/81   Pulse: 85       Objective   Physical Exam   Constitutional: She is oriented to person, place, and time. No distress.   Pulmonary/Chest: No respiratory distress.   Neurological: She is alert and oriented to person, place, and time.   Psychiatric: She has a normal mood and affect. Her behavior is normal. Judgment and thought content normal.         Assessment/Plan   Nicki was seen today for allergic rhinitis, obesity and hypertension.    Diagnoses and all orders for this visit:    Persistent cough for 3 weeks or longer    Seasonal allergic rhinitis, unspecified trigger    Asthma with COPD (CMS/[facility])    Essential hypertension    Morbidly obese (CMS/[facility])  -     phentermine 37.5 MG capsule; Take 1 capsule by mouth Every Morning.    Other orders  -     losartan (COZAAR) 50 MG tablet; Take 1 tablet by mouth Daily.  -     azelastine (ASTEPRO) 0.15 % solution nasal spray; 2 sprays into the nostril(s) as directed by provider 2 (Two) Times a Day.  -     albuterol sulfate  (90 Base) MCG/ACT inhaler; Inhale 2 puffs Every 4 (Four) Hours As Needed for Wheezing.  -     amoxicillin (AMOXIL) 875 MG tablet; Take 1 tablet by mouth 2 (Two) Times a Day.    stop ACE (-) and try Losartan---pt to monitor o/s BP readings for goal 120's/80's  Trial of nasal Antihist and spray nasal sprays toward the ears  Disc'd poss of not refilling the phentermine if no wt loss by next month    This visit has been rescheduled as a phone visit to comply with patient safety concerns in accordance with CDC recommendations. Total time of discussion was 20 minutes.    You have chosen to receive care through a telephone visit. Do you consent to use a telephone visit for your medical care today? YES

## 2020-04-22 ENCOUNTER — TELEPHONE (OUTPATIENT)
Dept: FAMILY MEDICINE CLINIC | Facility: CLINIC | Age: 55
End: 2020-04-22

## 2020-04-22 NOTE — TELEPHONE ENCOUNTER
Pt informed. Says [facility] will honor [facility] if she takes coupon up there. She will use the landon on her phone. I advised her to call us if any probs.

## 2020-04-30 ENCOUNTER — OFFICE VISIT (OUTPATIENT)
Dept: PAIN MEDICINE | Facility: CLINIC | Age: 55
End: 2020-04-30

## 2020-04-30 ENCOUNTER — TELEPHONE (OUTPATIENT)
Dept: PAIN MEDICINE | Facility: CLINIC | Age: 55
End: 2020-04-30

## 2020-04-30 VITALS — BODY MASS INDEX: 34.86 KG/M2 | WEIGHT: 230 LBS | HEIGHT: 68 IN

## 2020-04-30 DIAGNOSIS — M54.6 CHRONIC BILATERAL THORACIC BACK PAIN: ICD-10-CM

## 2020-04-30 DIAGNOSIS — G89.29 CHRONIC BILATERAL LOW BACK PAIN WITH SCIATICA, SCIATICA LATERALITY UNSPECIFIED: ICD-10-CM

## 2020-04-30 DIAGNOSIS — M79.10 MYALGIA: ICD-10-CM

## 2020-04-30 DIAGNOSIS — M54.40 CHRONIC BILATERAL LOW BACK PAIN WITH SCIATICA, SCIATICA LATERALITY UNSPECIFIED: ICD-10-CM

## 2020-04-30 DIAGNOSIS — G89.29 CHRONIC BILATERAL THORACIC BACK PAIN: ICD-10-CM

## 2020-04-30 DIAGNOSIS — M79.7 FIBROMYALGIA: ICD-10-CM

## 2020-04-30 DIAGNOSIS — M54.12 CERVICAL RADICULOPATHY: Primary | ICD-10-CM

## 2020-04-30 DIAGNOSIS — M48.02 CERVICAL SPINAL STENOSIS: ICD-10-CM

## 2020-04-30 DIAGNOSIS — M25.512 ACUTE PAIN OF LEFT SHOULDER: ICD-10-CM

## 2020-04-30 DIAGNOSIS — G89.4 CHRONIC PAIN DISORDER: ICD-10-CM

## 2020-04-30 PROCEDURE — 99441 PR PHYS/QHP TELEPHONE EVALUATION 5-10 MIN: CPT | Performed by: PHYSICAL MEDICINE & REHABILITATION

## 2020-04-30 RX ORDER — GABAPENTIN 300 MG/1
300 CAPSULE ORAL 3 TIMES DAILY
Qty: 90 CAPSULE | Refills: 2 | Status: SHIPPED | OUTPATIENT
Start: 2020-04-30 | End: 2020-05-04

## 2020-04-30 RX ORDER — HYDROCODONE BITARTRATE AND ACETAMINOPHEN 10; 325 MG/1; MG/1
1 TABLET ORAL EVERY 8 HOURS PRN
Qty: 90 TABLET | Refills: 0 | Status: SHIPPED | OUTPATIENT
Start: 2020-04-30 | End: 2020-05-20 | Stop reason: SDUPTHER

## 2020-04-30 NOTE — PROGRESS NOTES
Subjective   Nicki Fraser is a 54 y.o. female.     Neck and back pain, 10/10 at worst, 7/10 at best, always present, varies, aching, stabbing, worse with bending and lifting, interferes with ADLs, sleep, activity, failed PT, chiropractor, meds. MRI C-spine with DDD, stenosis. Saw Dr. Overton, had PT, given Norco 10mg TID prn, began receiving opioids from PCP, down to BID with less relief, PCP retiring, increased here to TID prn. Failed injections, surgery in past. Back pain worse after housework, no b/b incontinence, no new weakness or numbness. Worsening b/l thumb and great toe pain. Worsening neck pain radiating to L shoulder and LUE with numbness.       The following portions of the patient's history were reviewed and updated as appropriate: allergies, current medications, past family history, past medical history, past social history, past surgical history and problem list.    Review of Systems   Constitutional: Positive for fatigue. Negative for chills and fever.   HENT: Negative for hearing loss and trouble swallowing.    Eyes: Negative for visual disturbance.   Respiratory: Negative for shortness of breath.    Cardiovascular: Negative for chest pain.   Gastrointestinal: Positive for constipation and nausea. Negative for abdominal pain, diarrhea and vomiting.   Genitourinary: Negative for urinary incontinence.   Musculoskeletal: Positive for arthralgias, back pain, joint swelling and neck pain. Negative for myalgias.   Neurological: Positive for dizziness and headache. Negative for weakness and numbness.       Objective   Physical Exam   Constitutional: She is oriented to person, place, and time.   Neurological: She is alert and oriented to person, place, and time. She has normal strength and normal reflexes.   Psychiatric: She has a normal mood and affect. Her behavior is normal. Thought content normal.         Assessment/Plan   Nicki was seen today for neck pain and back pain.    Diagnoses and all  orders for this visit:    Cervical radiculopathy    Chronic bilateral low back pain with sciatica, sciatica laterality unspecified    Chronic bilateral thoracic back pain    Chronic pain disorder    Fibromyalgia    Myalgia    Cervical spinal stenosis      Inspect reviewed, in order, filled 4/25/20. UDS in order 7/2/19.  Treatment plan will consist of continuing current medication as long as it remains effective and is necessary, while evaluating patient at each visit and determining if the medication can be lowered or discontinued, while also using nonopioid therapies to reduce reliance on opioids.  Increased to Norco 10mg TID prn, continue.  Began Tizanidine 4mg TID prn.  Failed injections, surgery.  Insurance will not cover comp cream. Ordered Voltaren gel.  Pt will ask PCP for referral to allergist for possible bee sting allergy.  New MRI L shoulder.   RTC 3 months for f/u. Telephone visit, spent 7 minutes discussing her new symptoms and plan of care.

## 2020-05-01 RX ORDER — CYCLOBENZAPRINE HCL 10 MG
TABLET ORAL
Qty: 30 TABLET | Refills: 0 | Status: SHIPPED | OUTPATIENT
Start: 2020-05-01 | End: 2020-05-29

## 2020-05-04 RX ORDER — PREGABALIN 50 MG/1
50 CAPSULE ORAL 2 TIMES DAILY
Qty: 60 CAPSULE | Refills: 2 | Status: SHIPPED | OUTPATIENT
Start: 2020-05-04 | End: 2020-06-04

## 2020-05-11 RX ORDER — MONTELUKAST SODIUM 10 MG/1
TABLET ORAL
Qty: 90 TABLET | Refills: 0 | Status: SHIPPED | OUTPATIENT
Start: 2020-05-11 | End: 2020-05-18

## 2020-05-11 NOTE — TELEPHONE ENCOUNTER
Last visit:  12/11/19  Next visit: 4/21/20  Last labs: 21/11/19    Rx requested:Montelukast  [facility]: [facility]

## 2020-05-18 RX ORDER — AZELASTINE HCL 205.5 UG/1
SPRAY NASAL
Qty: 30 EACH | Refills: 0 | Status: SHIPPED | OUTPATIENT
Start: 2020-05-18 | End: 2020-06-04

## 2020-05-18 RX ORDER — MONTELUKAST SODIUM 10 MG/1
TABLET ORAL
Qty: 90 TABLET | Refills: 0 | Status: SHIPPED | OUTPATIENT
Start: 2020-05-18 | End: 2020-08-13

## 2020-05-18 NOTE — TELEPHONE ENCOUNTER
Last visit:  4/21/20  Next visit: none  Last labs:12/11/19    Rx requested: Montelukast,Azelastine   Pharmacy:[facility] in [place]

## 2020-05-19 RX ORDER — CYCLOBENZAPRINE HCL 10 MG
TABLET ORAL
Qty: 30 TABLET | Refills: 0 | OUTPATIENT
Start: 2020-05-19

## 2020-05-20 DIAGNOSIS — M54.40 CHRONIC BILATERAL LOW BACK PAIN WITH SCIATICA, SCIATICA LATERALITY UNSPECIFIED: ICD-10-CM

## 2020-05-20 DIAGNOSIS — G89.29 CHRONIC BILATERAL LOW BACK PAIN WITH SCIATICA, SCIATICA LATERALITY UNSPECIFIED: ICD-10-CM

## 2020-05-20 NOTE — TELEPHONE ENCOUNTER
Patient has not had her shoulder MRI done yet will need a new order for that and also wants a neck MRI done will need an order for that if you agree. Wants them done at [facility] in [place].

## 2020-05-21 ENCOUNTER — TELEPHONE (OUTPATIENT)
Dept: FAMILY MEDICINE CLINIC | Facility: CLINIC | Age: 55
End: 2020-05-21

## 2020-05-21 RX ORDER — HYDROCODONE BITARTRATE AND ACETAMINOPHEN 10; 325 MG/1; MG/1
1 TABLET ORAL EVERY 8 HOURS PRN
Qty: 90 TABLET | Refills: 0 | Status: SHIPPED | OUTPATIENT
Start: 2020-05-21 | End: 2020-06-20

## 2020-05-21 NOTE — TELEPHONE ENCOUNTER
PA for Azelastine 0.15% nasal Spray was approved from 5/19/20-12/31/2020.PA approval was faxed to the [facility] in [place].

## 2020-05-22 ENCOUNTER — TELEPHONE (OUTPATIENT)
Dept: PAIN MEDICINE | Facility: CLINIC | Age: 55
End: 2020-05-22

## 2020-05-22 DIAGNOSIS — M54.12 CERVICAL RADICULOPATHY: Primary | ICD-10-CM

## 2020-05-22 DIAGNOSIS — M25.512 CHRONIC LEFT SHOULDER PAIN: ICD-10-CM

## 2020-05-22 DIAGNOSIS — G89.29 CHRONIC LEFT SHOULDER PAIN: ICD-10-CM

## 2020-05-22 NOTE — TELEPHONE ENCOUNTER
Patient has not had her shoulder MRI done yet will need a new order for that and also wants a neck MRI done will need an order for that if you agree. Wants them done at [facility] in [place].

## 2020-05-28 DIAGNOSIS — E66.01 MORBIDLY OBESE (HCC): ICD-10-CM

## 2020-05-29 DIAGNOSIS — E66.01 MORBIDLY OBESE (HCC): ICD-10-CM

## 2020-05-29 RX ORDER — FLUTICASONE PROPIONATE 50 MCG
2 SPRAY, SUSPENSION (ML) NASAL DAILY
Qty: 1 BOTTLE | Refills: 4 | Status: CANCELLED | OUTPATIENT
Start: 2020-05-29

## 2020-05-29 RX ORDER — CYCLOBENZAPRINE HCL 10 MG
TABLET ORAL
Qty: 30 TABLET | Refills: 0 | Status: SHIPPED | OUTPATIENT
Start: 2020-05-29 | End: 2020-06-25

## 2020-05-29 RX ORDER — PHENTERMINE HYDROCHLORIDE 37.5 MG/1
CAPSULE ORAL
Qty: 30 CAPSULE | Refills: 0 | OUTPATIENT
Start: 2020-05-29

## 2020-05-29 RX ORDER — POLYETHYLENE GLYCOL 3350 17 G/17G
17 POWDER, FOR SOLUTION ORAL DAILY
Qty: 500 G | Refills: 4 | Status: CANCELLED | OUTPATIENT
Start: 2020-05-29

## 2020-05-29 NOTE — TELEPHONE ENCOUNTER
Last visit: 4/21/20  Next visit: none  Last labs: 12/11/19    Rx requested: Cyclobenzaprine   Ph[facility]acy:[facility] in [place]

## 2020-06-01 RX ORDER — IBUPROFEN 800 MG/1
800 TABLET ORAL EVERY 8 HOURS PRN
Qty: 90 TABLET | Refills: 0 | Status: SHIPPED | OUTPATIENT
Start: 2020-06-01 | End: 2020-06-04 | Stop reason: SDUPTHER

## 2020-06-01 RX ORDER — IBUPROFEN 800 MG/1
TABLET ORAL
Qty: 120 TABLET | Refills: 0 | OUTPATIENT
Start: 2020-06-01

## 2020-06-01 RX ORDER — FLUTICASONE PROPIONATE 50 MCG
2 SPRAY, SUSPENSION (ML) NASAL DAILY
Qty: 1 BOTTLE | Refills: 2 | Status: SHIPPED | OUTPATIENT
Start: 2020-06-01 | End: 2020-06-04 | Stop reason: SDUPTHER

## 2020-06-01 RX ORDER — PHENTERMINE HYDROCHLORIDE 37.5 MG/1
37.5 CAPSULE ORAL EVERY MORNING
Qty: 30 CAPSULE | Refills: 0 | OUTPATIENT
Start: 2020-06-01

## 2020-06-01 RX ORDER — POLYETHYLENE GLYCOL 3350 17 G/17G
17 POWDER, FOR SOLUTION ORAL DAILY
Qty: 500 G | Refills: 4 | Status: SHIPPED | OUTPATIENT
Start: 2020-06-01 | End: 2020-09-07 | Stop reason: SDUPTHER

## 2020-06-01 NOTE — TELEPHONE ENCOUNTER
Last visit: 4/21/20   Next visit:none  Last labs: 12/11/19    Rx requested: Phentermine,Ibuprofen,Fluticasone,Miralax  Pharmacy:[facility] in [place]

## 2020-06-04 ENCOUNTER — OFFICE VISIT (OUTPATIENT)
Dept: FAMILY MEDICINE CLINIC | Facility: CLINIC | Age: 55
End: 2020-06-04

## 2020-06-04 VITALS
HEART RATE: 84 BPM | WEIGHT: 242 LBS | DIASTOLIC BLOOD PRESSURE: 84 MMHG | BODY MASS INDEX: 36.68 KG/M2 | OXYGEN SATURATION: 97 % | SYSTOLIC BLOOD PRESSURE: 129 MMHG | RESPIRATION RATE: 18 BRPM | TEMPERATURE: 98.4 F | HEIGHT: 68 IN

## 2020-06-04 DIAGNOSIS — E66.01 CLASS 2 SEVERE OBESITY DUE TO EXCESS CALORIES WITH SERIOUS COMORBIDITY AND BODY MASS INDEX (BMI) OF 36.0 TO 36.9 IN ADULT (HCC): ICD-10-CM

## 2020-06-04 DIAGNOSIS — I10 ESSENTIAL HYPERTENSION: ICD-10-CM

## 2020-06-04 DIAGNOSIS — R60.9 SWELLING: Primary | ICD-10-CM

## 2020-06-04 PROCEDURE — 99213 OFFICE O/P EST LOW 20 MIN: CPT | Performed by: FAMILY MEDICINE

## 2020-06-04 RX ORDER — IBUPROFEN 800 MG/1
800 TABLET ORAL EVERY 8 HOURS PRN
Qty: 90 TABLET | Refills: 0 | Status: SHIPPED | OUTPATIENT
Start: 2020-06-04 | End: 2020-07-31

## 2020-06-04 RX ORDER — FLUTICASONE PROPIONATE 50 MCG
2 SPRAY, SUSPENSION (ML) NASAL DAILY
Qty: 1 BOTTLE | Refills: 2 | Status: SHIPPED | OUTPATIENT
Start: 2020-06-04 | End: 2020-09-07 | Stop reason: SDUPTHER

## 2020-06-04 RX ORDER — TRIAMTERENE AND HYDROCHLOROTHIAZIDE 37.5; 25 MG/1; MG/1
1 CAPSULE ORAL EVERY MORNING
Qty: 30 CAPSULE | Refills: 1 | Status: SHIPPED | OUTPATIENT
Start: 2020-06-04 | End: 2020-07-24

## 2020-06-04 RX ORDER — TOPIRAMATE 50 MG/1
50 TABLET, FILM COATED ORAL DAILY
Qty: 90 TABLET | Refills: 0 | Status: SHIPPED | OUTPATIENT
Start: 2020-06-04 | End: 2020-09-11 | Stop reason: SDUPTHER

## 2020-06-04 RX ORDER — PHENTERMINE HYDROCHLORIDE 37.5 MG/1
37.5 CAPSULE ORAL EVERY MORNING
Qty: 30 CAPSULE | Refills: 0 | Status: SHIPPED | OUTPATIENT
Start: 2020-06-04 | End: 2020-07-06 | Stop reason: SDUPTHER

## 2020-06-04 NOTE — PROGRESS NOTES
Subjective   Nicki Fraser is a 54 y.o. female.     Chief Complaint   Patient presents with   • Weight Check         Current Outpatient Medications:   •  albuterol (PROVENTIL) (2.5 MG/3ML) 0.083% nebulizer solution, Take 2.5 mg by nebulization Every 4 (Four) Hours As Needed for Wheezing., Disp: , Rfl:   •  albuterol sulfate  (90 Base) MCG/ACT inhaler, Inhale 2 puffs Every 4 (Four) Hours As Needed for Wheezing., Disp: 18 g, Rfl: 0  •  aspirin 325 MG tablet, Take 325 mg by mouth Daily., Disp: , Rfl:   •  Cholecalciferol (EQL VITAMIN D3) 2000 units capsule, Take 1 capsule by mouth Daily., Disp: , Rfl:   •  cyclobenzaprine (FLEXERIL) 10 MG tablet, TAKE ONE TABLET BY MOUTH AT NIGHT AS NEEDED FOR MUSCLE SPASMS, Disp: 30 tablet, Rfl: 0  •  dextromethorphan-guaifenesin (MUCINEX DM MAX STRENGTH)  MG per 12 hr tablet, Take 1 tablet by mouth 2 (Two) Times a Day As Needed., Disp: , Rfl:   •  diclofenac (VOLTAREN) 1 % gel gel, Apply 4 g topically to the appropriate area as directed 4 (Four) Times a Day As Needed (joint pain)., Disp: 5 tube, Rfl: 5  •  EPINEPHrine (EPIPEN) 0.3 MG/0.3ML solution auto-injector injection, As Needed., Disp: , Rfl:   •  fluticasone (FLONASE) 50 MCG/ACT nasal spray, 2 sprays into the nostril(s) as directed by provider Daily., Disp: 1 bottle, Rfl: 2  •  furosemide (LASIX) 20 MG tablet, Take 1 tablet by mouth Daily As Needed (swelling). (Patient taking differently: Take 20 mg by mouth Every Other Day.), Disp: , Rfl:   •  HYDROcodone-acetaminophen (Norco)  MG per tablet, Take 1 tablet by mouth Every 8 (Eight) Hours As Needed for Moderate Pain  for up to 30 days., Disp: 90 tablet, Rfl: 0  •  ibuprofen (ADVIL,MOTRIN) 800 MG tablet, Take 1 tablet by mouth Every 8 (Eight) Hours As Needed for Mild Pain ., Disp: 90 tablet, Rfl: 0  •  loratadine (CLARITIN) 10 MG tablet, Take 10 mg by mouth Daily., Disp: , Rfl:   •  montelukast (SINGULAIR) 10 MG tablet, TAKE ONE TABLET BY MOUTH  DAILY, Disp: 90 tablet, Rfl: 0  •  phentermine 37.5 MG capsule, Take 1 capsule by mouth Every Morning., Disp: 30 capsule, Rfl: 0  •  polyethylene glycol (MIRALAX) 17 GM/SCOOP powder, Take 17 g by mouth Daily., Disp: 500 g, Rfl: 4  •  Potassium Chloride (KLOR-CON PO), Take  by mouth., Disp: , Rfl:   •  topiramate (TOPAMAX) 50 MG tablet, Take 1 tablet by mouth Daily., Disp: 90 tablet, Rfl: 0  •  ipratropium (ATROVENT) 0.02 % nebulizer solution, Take 2.5 mL by nebulization 4 (Four) Times a Day As Needed for Wheezing or Shortness of Air (mix w/ the albuterol vial)., Disp: 150 mL, Rfl: 1  •  triamterene-hydrochlorothiazide (DYAZIDE) 37.5-25 MG per capsule, Take 1 capsule by mouth Every Morning., Disp: 30 capsule, Rfl: 1    Past Medical History:   Diagnosis Date   • Allergic    • Cervical radiculopathy    • DDD (degenerative disc disease), cervical    • Fibromyalgia    • Herniated disc, cervical     Abstraction from [facility] Herniated Disc; Cervical Degenerated disc.   • Hypertension    • Left shoulder pain    • MAMMO     NEG =    • Migraine headache    • Myalgia    • Peripheral edema        Past Surgical History:   Procedure Laterality Date   •  SECTION     • COLONOSCOPY      COLOGUARD ---NEG = , rech    • LYMPH NODE DISSECTION      cervical node= NEG   • TUBAL ABDOMINAL LIGATION         Family History   Problem Relation Age of Onset   • Heart disease Mother    • Osteoporosis Mother    • Hypertension Father    • Kidney disease Father         KIDNEY STONES   • Stroke Father    • Arthritis Brother    • Diabetes Brother    • Heart disease Brother    • Hypertension Brother        Social History     Socioeconomic History   • Marital status:      Spouse name: Not on file   • Number of children: Not on file   • Years of education: Not on file   • Highest education level: Not on file   Tobacco Use   • Smoking status: Current Every Day Smoker     Packs/day: 1.00     Years: 30.     Pack years: 30      Types: Cigarettes   • Smokeless tobacco: Never Used   Substance and Sexual Activity   • Alcohol use: Yes     Comment: occ   • Drug use: No   • Sexual activity: Never       53 y/o C female here for f/u on HTN/ wt loss med    Pt tolerating the topamax/ phentermine but feels the current BP med is causing swelling; cough is better off the ACE (-) but swelling is all over    Pt states she has noticed as much as 8# wt gain and her fingers are so swollen her rings are tight when they usu are loose       The following portions of the patient's history were reviewed and updated as appropriate: allergies, current medications, past family history, past medical history, past social history, past surgical history and problem list.    Review of Systems   Constitutional: Positive for unexpected weight gain. Negative for activity change, appetite change, fatigue and unexpected weight loss.   Respiratory: Negative for cough, chest tightness and shortness of breath.    Cardiovascular: Negative for chest pain, palpitations and leg swelling.   Gastrointestinal: Negative for constipation.   Genitourinary: Negative for frequency, urgency and urinary incontinence.   Musculoskeletal: Negative for arthralgias and myalgias.   Neurological: Negative for dizziness, facial asymmetry, speech difficulty, weakness, light-headedness, headache, memory problem and confusion.       Vitals:    06/04/20 1123   BP: 129/84   Pulse: 84   Resp: 18   Temp: 98.4 °F (36.9 °C)   SpO2: 97%       Objective   Physical Exam   Constitutional: She is oriented to person, place, and time. She appears well-developed and well-nourished. No distress. She is obese.  HENT:   Head: Normocephalic and atraumatic.   Neck: Normal range of motion. Neck supple.   Cardiovascular: Normal rate, regular rhythm, normal heart sounds and intact distal pulses.   No murmur heard.  Pulmonary/Chest: Effort normal and breath sounds normal.   Musculoskeletal: She exhibits no edema.    Neurological: She is alert and oriented to person, place, and time. No cranial nerve deficit.   Skin: Skin is warm and dry. No rash noted.   Psychiatric: She has a normal mood and affect. Her behavior is normal. Judgment and thought content normal.   Nursing note and vitals reviewed.        Assessment/Plan   Nicki was seen today for weight check.    Diagnoses and all orders for this visit:    Swelling    Essential hypertension    Morbidly obese (CMS/HCC)  -     phentermine 37.5 MG capsule; Take 1 capsule by mouth Every Morning.    Other orders  -     triamterene-hydrochlorothiazide (DYAZIDE) 37.5-25 MG per capsule; Take 1 capsule by mouth Every Morning.  -     Discontinue: ipratropium (ATROVENT) 0.02 % nebulizer solution; Take 2.5 mL by nebulization 4 (Four) Times a Day As Needed for Wheezing or Shortness of Air (mix w/ the albuterol vial).  -     ipratropium (ATROVENT) 0.02 % nebulizer solution; Take 2.5 mL by nebulization 4 (Four) Times a Day As Needed for Wheezing or Shortness of Air (mix w/ the albuterol vial).  -     ibuprofen (ADVIL,MOTRIN) 800 MG tablet; Take 1 tablet by mouth Every 8 (Eight) Hours As Needed for Mild Pain .  -     topiramate (TOPAMAX) 50 MG tablet; Take 1 tablet by mouth Daily.  -     fluticasone (FLONASE) 50 MCG/ACT nasal spray; 2 sprays into the nostril(s) as directed by provider Daily.

## 2020-06-09 RX ORDER — AZELASTINE HCL 205.5 UG/1
SPRAY NASAL
Qty: 30 EACH | Refills: 0 | OUTPATIENT
Start: 2020-06-09

## 2020-06-15 RX ORDER — LISINOPRIL 10 MG/1
TABLET ORAL
Qty: 90 TABLET | Refills: 2 | OUTPATIENT
Start: 2020-06-15

## 2020-06-17 RX ORDER — LOSARTAN POTASSIUM 50 MG/1
TABLET ORAL
Qty: 30 TABLET | Refills: 0 | OUTPATIENT
Start: 2020-06-17

## 2020-06-25 ENCOUNTER — TELEPHONE (OUTPATIENT)
Dept: PAIN MEDICINE | Facility: CLINIC | Age: 55
End: 2020-06-25

## 2020-06-25 RX ORDER — CYCLOBENZAPRINE HCL 10 MG
TABLET ORAL
Qty: 30 TABLET | Refills: 0 | Status: SHIPPED | OUTPATIENT
Start: 2020-06-25 | End: 2020-09-11 | Stop reason: SDUPTHER

## 2020-06-25 RX ORDER — HYDROCODONE BITARTRATE AND ACETAMINOPHEN 10; 325 MG/1; MG/1
1 TABLET ORAL EVERY 8 HOURS PRN
Qty: 120 TABLET | Refills: 0 | Status: SHIPPED | OUTPATIENT
Start: 2020-06-25 | End: 2020-07-27 | Stop reason: SDUPTHER

## 2020-06-29 RX ORDER — ATENOLOL 25 MG/1
25 TABLET ORAL DAILY
Qty: 30 TABLET | Refills: 1 | Status: SHIPPED | OUTPATIENT
Start: 2020-06-29 | End: 2020-08-24

## 2020-07-06 DIAGNOSIS — E66.01 CLASS 2 SEVERE OBESITY DUE TO EXCESS CALORIES WITH SERIOUS COMORBIDITY AND BODY MASS INDEX (BMI) OF 36.0 TO 36.9 IN ADULT (HCC): ICD-10-CM

## 2020-07-06 RX ORDER — PHENTERMINE HYDROCHLORIDE 37.5 MG/1
37.5 CAPSULE ORAL EVERY MORNING
Qty: 30 CAPSULE | Refills: 0 | Status: SHIPPED | OUTPATIENT
Start: 2020-07-06 | End: 2020-08-03

## 2020-07-24 RX ORDER — TRIAMTERENE AND HYDROCHLOROTHIAZIDE 37.5; 25 MG/1; MG/1
CAPSULE ORAL
Qty: 90 CAPSULE | Refills: 0 | Status: SHIPPED | OUTPATIENT
Start: 2020-07-24 | End: 2020-09-11 | Stop reason: SDUPTHER

## 2020-07-27 RX ORDER — HYDROCODONE BITARTRATE AND ACETAMINOPHEN 10; 325 MG/1; MG/1
1 TABLET ORAL EVERY 8 HOURS PRN
Qty: 120 TABLET | Refills: 0 | Status: SHIPPED | OUTPATIENT
Start: 2020-07-27 | End: 2020-07-30 | Stop reason: SDUPTHER

## 2020-07-30 ENCOUNTER — RESULTS ENCOUNTER (OUTPATIENT)
Dept: PAIN MEDICINE | Facility: CLINIC | Age: 55
End: 2020-07-30

## 2020-07-30 ENCOUNTER — OFFICE VISIT (OUTPATIENT)
Dept: PAIN MEDICINE | Facility: CLINIC | Age: 55
End: 2020-07-30

## 2020-07-30 VITALS
SYSTOLIC BLOOD PRESSURE: 107 MMHG | DIASTOLIC BLOOD PRESSURE: 74 MMHG | OXYGEN SATURATION: 99 % | HEIGHT: 68 IN | TEMPERATURE: 97.5 F | RESPIRATION RATE: 16 BRPM | HEART RATE: 81 BPM | BODY MASS INDEX: 35.31 KG/M2 | WEIGHT: 233 LBS

## 2020-07-30 DIAGNOSIS — G89.4 CHRONIC PAIN DISORDER: ICD-10-CM

## 2020-07-30 DIAGNOSIS — G89.29 CHRONIC BILATERAL THORACIC BACK PAIN: ICD-10-CM

## 2020-07-30 DIAGNOSIS — M54.6 CHRONIC BILATERAL THORACIC BACK PAIN: ICD-10-CM

## 2020-07-30 DIAGNOSIS — F19.90 CURRENT DRUG USE: Primary | ICD-10-CM

## 2020-07-30 DIAGNOSIS — G89.29 CHRONIC BILATERAL LOW BACK PAIN WITH SCIATICA, SCIATICA LATERALITY UNSPECIFIED: ICD-10-CM

## 2020-07-30 DIAGNOSIS — M25.50 ARTHRALGIA, UNSPECIFIED JOINT: ICD-10-CM

## 2020-07-30 DIAGNOSIS — M54.40 CHRONIC BILATERAL LOW BACK PAIN WITH SCIATICA, SCIATICA LATERALITY UNSPECIFIED: ICD-10-CM

## 2020-07-30 DIAGNOSIS — M54.12 CERVICAL RADICULOPATHY: ICD-10-CM

## 2020-07-30 DIAGNOSIS — M79.10 MYALGIA: ICD-10-CM

## 2020-07-30 DIAGNOSIS — M79.7 FIBROMYALGIA: ICD-10-CM

## 2020-07-30 DIAGNOSIS — F19.90 CURRENT DRUG USE: ICD-10-CM

## 2020-07-30 PROCEDURE — G0463 HOSPITAL OUTPT CLINIC VISIT: HCPCS | Performed by: PHYSICAL MEDICINE & REHABILITATION

## 2020-07-30 PROCEDURE — 99213 OFFICE O/P EST LOW 20 MIN: CPT | Performed by: PHYSICAL MEDICINE & REHABILITATION

## 2020-07-30 RX ORDER — TIZANIDINE 4 MG/1
4 TABLET ORAL EVERY 8 HOURS PRN
Qty: 90 TABLET | Refills: 11 | Status: SHIPPED | OUTPATIENT
Start: 2020-07-30 | End: 2020-12-07

## 2020-07-30 RX ORDER — PREGABALIN 50 MG/1
50 CAPSULE ORAL 2 TIMES DAILY
Qty: 60 CAPSULE | Refills: 2 | Status: SHIPPED | OUTPATIENT
Start: 2020-07-30 | End: 2020-09-11

## 2020-07-30 RX ORDER — HYDROCODONE BITARTRATE AND ACETAMINOPHEN 10; 325 MG/1; MG/1
1 TABLET ORAL EVERY 8 HOURS PRN
Qty: 120 TABLET | Refills: 0 | Status: SHIPPED | OUTPATIENT
Start: 2020-07-30 | End: 2020-07-30 | Stop reason: SDUPTHER

## 2020-07-30 RX ORDER — HYDROCODONE BITARTRATE AND ACETAMINOPHEN 10; 325 MG/1; MG/1
1 TABLET ORAL EVERY 8 HOURS PRN
Qty: 120 TABLET | Refills: 0 | Status: SHIPPED | OUTPATIENT
Start: 2020-07-30 | End: 2020-10-27 | Stop reason: SDUPTHER

## 2020-07-30 NOTE — PROGRESS NOTES
Subjective   Nicki Fraser is a 54 y.o. female.     Neck and back pain, 10/10 at worst, 7/10 at best, always present, varies, aching, stabbing, worse with bending and lifting, interferes with ADLs, sleep, activity, failed PT, chiropractor, meds. MRI C-spine with DDD, stenosis. Saw Dr. Overton, had PT, given Norco 10mg TID prn, began receiving opioids from PCP, down to BID with less relief, PCP retiring, increased here to TID prn. Failed injections, surgery in past. Back pain worse after housework, no b/b incontinence, no new weakness or numbness. Worsening b/l thumb and great toe pain.       The following portions of the patient's history were reviewed and updated as appropriate: allergies, current medications, past family history, past medical history, past social history, past surgical history and problem list.    Review of Systems   Constitutional: Positive for fatigue. Negative for chills and fever.   HENT: Negative for hearing loss and trouble swallowing.    Eyes: Negative for visual disturbance.   Respiratory: Negative for shortness of breath.    Cardiovascular: Negative for chest pain.   Gastrointestinal: Positive for constipation and nausea. Negative for abdominal pain, diarrhea and vomiting.   Genitourinary: Negative for urinary incontinence.   Musculoskeletal: Positive for arthralgias, back pain, joint swelling and neck pain. Negative for myalgias.   Neurological: Positive for dizziness and headache. Negative for weakness and numbness.       Objective   Physical Exam   Constitutional: She is oriented to person, place, and time. She appears well-developed and well-nourished.   HENT:   Head: Normocephalic and atraumatic.   Eyes: Pupils are equal, round, and reactive to light. EOM are normal.   Neck: Normal range of motion.   Cardiovascular: Normal rate, regular rhythm, normal heart sounds and intact distal pulses.   Pulmonary/Chest: Breath sounds normal.   Abdominal: Soft. Bowel sounds are normal.  She exhibits no distension. There is no tenderness.   Neurological: She is alert and oriented to person, place, and time. She has normal strength and normal reflexes. She displays normal reflexes. No sensory deficit.   Psychiatric: She has a normal mood and affect. Her behavior is normal. Thought content normal.         Assessment/Plan   Nicki was seen today for back pain.    Diagnoses and all orders for this visit:    Chronic bilateral low back pain with sciatica, sciatica laterality unspecified    Chronic bilateral thoracic back pain    Chronic pain disorder    Fibromyalgia    Myalgia    Arthralgia, unspecified joint    Cervical radiculopathy      Inspect reviewed, in order. UDS in order 7/2/19.  Treatment plan will consist of continuing current medication as long as it remains effective and is necessary, while evaluating patient at each visit and determining if the medication can be lowered or discontinued, while also using nonopioid therapies to reduce reliance on opioids.  Increased to Norco 10mg QID prn, continue, doing well, denies side effects.  Began Tizanidine 4mg TID prn.  Began Lyrica 50mg BID, cannot tolerate Gabapentin.  Failed injections, surgery.  Insurance will not cover comp cream. Cont Voltaren.  Pt will ask PCP for referral to allergist for possible bee sting allergy.  RTC 3 months for f/u.

## 2020-07-31 ENCOUNTER — TELEPHONE (OUTPATIENT)
Dept: PAIN MEDICINE | Facility: HOSPITAL | Age: 55
End: 2020-07-31

## 2020-07-31 RX ORDER — IBUPROFEN 800 MG/1
TABLET ORAL
Qty: 90 TABLET | Refills: 0 | Status: SHIPPED | OUTPATIENT
Start: 2020-07-31 | End: 2020-08-28

## 2020-07-31 NOTE — TELEPHONE ENCOUNTER
We don't have those yet, just the pending orders. Did she have them performed somewhere we can request the records from?

## 2020-08-02 DIAGNOSIS — E66.01 CLASS 2 SEVERE OBESITY DUE TO EXCESS CALORIES WITH SERIOUS COMORBIDITY AND BODY MASS INDEX (BMI) OF 36.0 TO 36.9 IN ADULT (HCC): ICD-10-CM

## 2020-08-03 RX ORDER — PHENTERMINE HYDROCHLORIDE 37.5 MG/1
CAPSULE ORAL
Qty: 30 CAPSULE | Refills: 0 | Status: SHIPPED | OUTPATIENT
Start: 2020-08-03 | End: 2020-09-07 | Stop reason: SDUPTHER

## 2020-08-05 ENCOUNTER — PATIENT MESSAGE (OUTPATIENT)
Dept: FAMILY MEDICINE CLINIC | Facility: CLINIC | Age: 55
End: 2020-08-05

## 2020-08-13 RX ORDER — MONTELUKAST SODIUM 10 MG/1
TABLET ORAL
Qty: 90 TABLET | Refills: 0 | Status: SHIPPED | OUTPATIENT
Start: 2020-08-13 | End: 2020-11-19

## 2020-08-13 NOTE — TELEPHONE ENCOUNTER
Last visit:  6/4/20  Next visit: none  Last labs: 12/11/19    Rx requested: Montelukast   Pharmacy: [facility] in [place]

## 2020-08-24 RX ORDER — ATENOLOL 25 MG/1
TABLET ORAL
Qty: 90 TABLET | Refills: 0 | Status: SHIPPED | OUTPATIENT
Start: 2020-08-24 | End: 2020-09-11

## 2020-08-24 NOTE — TELEPHONE ENCOUNTER
Last visit:  6/4/20  Next visit: none  Last labs: 12/11/19    Rx requested: Atenolol   Pharmacy: [facility] in [place]

## 2020-08-28 RX ORDER — IBUPROFEN 800 MG/1
TABLET ORAL
Qty: 90 TABLET | Refills: 0 | Status: SHIPPED | OUTPATIENT
Start: 2020-08-28 | End: 2020-09-28

## 2020-09-07 DIAGNOSIS — E66.01 CLASS 2 SEVERE OBESITY DUE TO EXCESS CALORIES WITH SERIOUS COMORBIDITY AND BODY MASS INDEX (BMI) OF 36.0 TO 36.9 IN ADULT (HCC): ICD-10-CM

## 2020-09-08 RX ORDER — FLUTICASONE PROPIONATE 50 MCG
2 SPRAY, SUSPENSION (ML) NASAL DAILY
Qty: 1 BOTTLE | Refills: 0 | Status: SHIPPED | OUTPATIENT
Start: 2020-09-08 | End: 2021-02-08

## 2020-09-08 RX ORDER — PHENTERMINE HYDROCHLORIDE 37.5 MG/1
37.5 CAPSULE ORAL EVERY MORNING
Qty: 30 CAPSULE | Refills: 0 | Status: SHIPPED | OUTPATIENT
Start: 2020-09-08 | End: 2020-10-08 | Stop reason: SDUPTHER

## 2020-09-08 RX ORDER — POLYETHYLENE GLYCOL 3350 17 G/17G
17 POWDER, FOR SOLUTION ORAL DAILY
Qty: 500 G | Refills: 0 | Status: SHIPPED | OUTPATIENT
Start: 2020-09-08 | End: 2020-11-12

## 2020-09-08 NOTE — TELEPHONE ENCOUNTER
Patient scheduled for f/u on 9/11.  Patient is asking if she could possibly get a few days of medication sent in to get her by until her appt?

## 2020-09-08 NOTE — TELEPHONE ENCOUNTER
Last visit:  6/4/20  Next visitnone  Last labs: 12/11/19    Rx requested:Phentermine, Flonase, Miralax   Pharmacy: [facility] [place]

## 2020-09-11 ENCOUNTER — OFFICE VISIT (OUTPATIENT)
Dept: FAMILY MEDICINE CLINIC | Facility: CLINIC | Age: 55
End: 2020-09-11

## 2020-09-11 VITALS
HEIGHT: 68 IN | BODY MASS INDEX: 36.07 KG/M2 | HEART RATE: 84 BPM | DIASTOLIC BLOOD PRESSURE: 69 MMHG | TEMPERATURE: 96.9 F | RESPIRATION RATE: 18 BRPM | SYSTOLIC BLOOD PRESSURE: 108 MMHG | WEIGHT: 238 LBS | OXYGEN SATURATION: 98 %

## 2020-09-11 DIAGNOSIS — I10 ESSENTIAL HYPERTENSION: ICD-10-CM

## 2020-09-11 DIAGNOSIS — E66.01 MORBIDLY OBESE (HCC): Primary | ICD-10-CM

## 2020-09-11 DIAGNOSIS — Z12.31 ENCOUNTER FOR SCREENING MAMMOGRAM FOR BREAST CANCER: ICD-10-CM

## 2020-09-11 PROCEDURE — 99213 OFFICE O/P EST LOW 20 MIN: CPT | Performed by: FAMILY MEDICINE

## 2020-09-11 RX ORDER — TRIAMTERENE AND HYDROCHLOROTHIAZIDE 37.5; 25 MG/1; MG/1
1 CAPSULE ORAL EVERY MORNING
Qty: 90 CAPSULE | Refills: 0 | Status: SHIPPED | OUTPATIENT
Start: 2020-09-11 | End: 2020-11-05

## 2020-09-11 RX ORDER — EPINEPHRINE 0.3 MG/.3ML
0.3 INJECTION SUBCUTANEOUS AS NEEDED
Qty: 1 EACH | Refills: 0 | Status: SHIPPED | OUTPATIENT
Start: 2020-09-11 | End: 2021-10-12 | Stop reason: SDUPTHER

## 2020-09-11 RX ORDER — CYCLOBENZAPRINE HCL 10 MG
10 TABLET ORAL NIGHTLY PRN
Qty: 30 TABLET | Refills: 0 | Status: SHIPPED | OUTPATIENT
Start: 2020-09-11 | End: 2020-12-07

## 2020-09-11 RX ORDER — TOPIRAMATE 50 MG/1
50 TABLET, FILM COATED ORAL DAILY
Qty: 90 TABLET | Refills: 0 | Status: SHIPPED | OUTPATIENT
Start: 2020-09-11 | End: 2021-07-16 | Stop reason: SDUPTHER

## 2020-09-11 NOTE — PROGRESS NOTES
Subjective   Nicki Fraser is a 54 y.o. female.     Chief Complaint   Patient presents with   • Follow-up   • Weight Check     phentermine refills         Current Outpatient Medications:   •  albuterol (PROVENTIL) (2.5 MG/3ML) 0.083% nebulizer solution, Take 2.5 mg by nebulization Every 4 (Four) Hours As Needed for Wheezing., Disp: , Rfl:   •  albuterol sulfate  (90 Base) MCG/ACT inhaler, Inhale 2 puffs Every 4 (Four) Hours As Needed for Wheezing., Disp: 18 g, Rfl: 0  •  aspirin 325 MG tablet, Take 325 mg by mouth Daily., Disp: , Rfl:   •  Cholecalciferol (EQL VITAMIN D3) 2000 units capsule, Take 1 capsule by mouth Daily., Disp: , Rfl:   •  cyclobenzaprine (FLEXERIL) 10 MG tablet, Take 1 tablet by mouth At Night As Needed for Muscle Spasms., Disp: 30 tablet, Rfl: 0  •  dextromethorphan-guaifenesin (MUCINEX DM MAX STRENGTH)  MG per 12 hr tablet, Take 1 tablet by mouth 2 (Two) Times a Day As Needed., Disp: , Rfl:   •  diclofenac (VOLTAREN) 1 % gel gel, Apply 4 g topically to the appropriate area as directed 4 (Four) Times a Day As Needed (joint pain)., Disp: 5 tube, Rfl: 11  •  EPINEPHrine (EPIPEN) 0.3 MG/0.3ML solution auto-injector injection, Inject 0.3 mL under the skin into the appropriate area as directed As Needed (allergic reaction)., Disp: 1 each, Rfl: 0  •  fluticasone (FLONASE) 50 MCG/ACT nasal spray, 2 sprays into the nostril(s) as directed by provider Daily., Disp: 1 bottle, Rfl: 0  •  HYDROcodone-acetaminophen (Norco)  MG per tablet, Take 1 tablet by mouth Every 8 (Eight) Hours As Needed for Moderate Pain ., Disp: 120 tablet, Rfl: 0  •  ibuprofen (ADVIL,MOTRIN) 800 MG tablet, TAKE ONE TABLET BY MOUTH EVERY 8 HOURS, Disp: 90 tablet, Rfl: 0  •  loratadine (CLARITIN) 10 MG tablet, Take 10 mg by mouth Daily., Disp: , Rfl:   •  montelukast (SINGULAIR) 10 MG tablet, TAKE ONE TABLET BY MOUTH DAILY, Disp: 90 tablet, Rfl: 0  •  phentermine 37.5 MG capsule, Take 1 capsule by mouth  Every Morning., Disp: 30 capsule, Rfl: 0  •  polyethylene glycol (MIRALAX) 17 GM/SCOOP powder, Take 17 g by mouth Daily., Disp: 500 g, Rfl: 0  •  tiZANidine (ZANAFLEX) 4 MG tablet, Take 1 tablet by mouth Every 8 (Eight) Hours As Needed for Muscle Spasms., Disp: 90 tablet, Rfl: 11  •  topiramate (TOPAMAX) 50 MG tablet, Take 1 tablet by mouth Daily., Disp: 90 tablet, Rfl: 0  •  triamterene-hydrochlorothiazide (DYAZIDE) 37.5-25 MG per capsule, Take 1 capsule by mouth Every Morning., Disp: 90 capsule, Rfl: 0    Past Medical History:   Diagnosis Date   • Allergic    • Cervical radiculopathy    • DDD (degenerative disc disease), cervical    • Fibromyalgia    • Herniated disc, cervical     Abstraction from [facility] Herniated Disc; Cervical Degenerated disc.   • Hypertension    • Left shoulder pain    • MAMMO     NEG = 2019   • Migraine headache    • Myalgia    • PAP     2019= ASCUS   • Peripheral edema        Past Surgical History:   Procedure Laterality Date   •  SECTION     • COLONOSCOPY      COLOGUARD ---NEG = , rech    • LYMPH NODE DISSECTION      cervical node= NEG   • TUBAL ABDOMINAL LIGATION         Family History   Problem Relation Age of Onset   • Heart disease Mother    • Osteoporosis Mother    • Hypertension Father    • Kidney disease Father         KIDNEY STONES   • Stroke Father    • Arthritis Brother    • Diabetes Brother    • Heart disease Brother    • Hypertension Brother        Social History     Socioeconomic History   • Marital status:      Spouse name: Not on file   • Number of children: Not on file   • Years of education: Not on file   • Highest education level: Not on file   Tobacco Use   • Smoking status: Current Every Day Smoker     Packs/day: 1.00     Years: 30.00     Pack years: 30.00     Types: Cigarettes   • Smokeless tobacco: Never Used   Substance and Sexual Activity   • Alcohol use: Yes     Comment: occ   • Drug use: No   • Sexual activity: Never       55 y/o C  female here for 3mos f/u on wt loss med    Pt states she isn't able to exercise due to her exertional SOB but she is eating less       The following portions of the patient's history were reviewed and updated as appropriate: allergies, current medications, past family history, past medical history, past social history, past surgical history and problem list.    Review of Systems   Constitutional: Positive for appetite change. Negative for activity change, unexpected weight gain and unexpected weight loss.   Gastrointestinal: Negative for constipation.   Musculoskeletal: Positive for myalgias.   Psychiatric/Behavioral: Negative for sleep disturbance.       Vitals:    09/11/20 1533   BP: 108/69   Pulse: 84   Resp: 18   Temp: 96.9 °F (36.1 °C)   SpO2: 98%       Objective   Physical Exam   Constitutional: She is oriented to person, place, and time. She appears well-developed and well-nourished. She is obese.  HENT:   Head: Normocephalic and atraumatic.   Neck: Normal range of motion. Neck supple.   Cardiovascular: Normal rate, regular rhythm and normal heart sounds.   No murmur heard.  Pulmonary/Chest: Effort normal and breath sounds normal.   Neurological: She is alert and oriented to person, place, and time.   Skin: Skin is warm and dry. No rash noted.   Psychiatric: Her behavior is normal. Mood, judgment and thought content normal.   Nursing note and vitals reviewed.        Assessment/Plan   Nicki was seen today for follow-up and weight check.    Diagnoses and all orders for this visit:    Morbidly obese (CMS/HCC)    Essential hypertension    Encounter for screening mammogram for breast cancer  -     Mammo Screening Digital Tomosynthesis Bilateral With CAD; Future    Other orders  -     triamterene-hydrochlorothiazide (DYAZIDE) 37.5-25 MG per capsule; Take 1 capsule by mouth Every Morning.  -     EPINEPHrine (EPIPEN) 0.3 MG/0.3ML solution auto-injector injection; Inject 0.3 mL under the skin into the appropriate area  as directed As Needed (allergic reaction).  -     topiramate (TOPAMAX) 50 MG tablet; Take 1 tablet by mouth Daily.  -     cyclobenzaprine (FLEXERIL) 10 MG tablet; Take 1 tablet by mouth At Night As Needed for Muscle Spasms.

## 2020-09-28 RX ORDER — IBUPROFEN 800 MG/1
TABLET ORAL
Qty: 90 TABLET | Refills: 0 | Status: SHIPPED | OUTPATIENT
Start: 2020-09-28 | End: 2020-10-26

## 2020-10-08 DIAGNOSIS — E66.01 CLASS 2 SEVERE OBESITY DUE TO EXCESS CALORIES WITH SERIOUS COMORBIDITY AND BODY MASS INDEX (BMI) OF 36.0 TO 36.9 IN ADULT (HCC): ICD-10-CM

## 2020-10-08 RX ORDER — PHENTERMINE HYDROCHLORIDE 37.5 MG/1
37.5 CAPSULE ORAL EVERY MORNING
Qty: 30 CAPSULE | Refills: 0 | Status: SHIPPED | OUTPATIENT
Start: 2020-10-08 | End: 2020-10-27 | Stop reason: SDUPTHER

## 2020-10-26 RX ORDER — IBUPROFEN 800 MG/1
TABLET ORAL
Qty: 90 TABLET | Refills: 0 | Status: SHIPPED | OUTPATIENT
Start: 2020-10-26 | End: 2020-11-24

## 2020-10-26 NOTE — TELEPHONE ENCOUNTER
Last visit:  9/11/20  Next visit: 10/27/20  Last labs: 9/25/19    Rx requested: Ibuprofen   Pharmacy: [facility] in [place]

## 2020-10-27 ENCOUNTER — OFFICE VISIT (OUTPATIENT)
Dept: PAIN MEDICINE | Facility: CLINIC | Age: 55
End: 2020-10-27

## 2020-10-27 ENCOUNTER — OFFICE VISIT (OUTPATIENT)
Dept: FAMILY MEDICINE CLINIC | Facility: CLINIC | Age: 55
End: 2020-10-27

## 2020-10-27 VITALS
HEART RATE: 95 BPM | WEIGHT: 240 LBS | RESPIRATION RATE: 16 BRPM | DIASTOLIC BLOOD PRESSURE: 83 MMHG | HEIGHT: 68 IN | SYSTOLIC BLOOD PRESSURE: 161 MMHG | OXYGEN SATURATION: 99 % | TEMPERATURE: 97.1 F | BODY MASS INDEX: 36.37 KG/M2

## 2020-10-27 VITALS
RESPIRATION RATE: 16 BRPM | WEIGHT: 242 LBS | TEMPERATURE: 97.1 F | HEIGHT: 68 IN | OXYGEN SATURATION: 98 % | SYSTOLIC BLOOD PRESSURE: 135 MMHG | HEART RATE: 96 BPM | BODY MASS INDEX: 36.68 KG/M2 | DIASTOLIC BLOOD PRESSURE: 84 MMHG

## 2020-10-27 DIAGNOSIS — E66.01 CLASS 2 SEVERE OBESITY DUE TO EXCESS CALORIES WITH SERIOUS COMORBIDITY AND BODY MASS INDEX (BMI) OF 36.0 TO 36.9 IN ADULT (HCC): ICD-10-CM

## 2020-10-27 DIAGNOSIS — K59.09 CHRONIC CONSTIPATION: ICD-10-CM

## 2020-10-27 DIAGNOSIS — M54.12 CERVICAL RADICULOPATHY: ICD-10-CM

## 2020-10-27 DIAGNOSIS — Z00.00 WELLNESS EXAMINATION: Primary | ICD-10-CM

## 2020-10-27 DIAGNOSIS — M79.7 FIBROMYALGIA: ICD-10-CM

## 2020-10-27 DIAGNOSIS — G89.4 CHRONIC PAIN DISORDER: ICD-10-CM

## 2020-10-27 DIAGNOSIS — M54.40 CHRONIC BILATERAL LOW BACK PAIN WITH SCIATICA, SCIATICA LATERALITY UNSPECIFIED: Primary | ICD-10-CM

## 2020-10-27 DIAGNOSIS — Z13.220 SCREENING FOR LIPID DISORDERS: ICD-10-CM

## 2020-10-27 DIAGNOSIS — M48.02 CERVICAL SPINAL STENOSIS: ICD-10-CM

## 2020-10-27 DIAGNOSIS — M79.10 MYALGIA: ICD-10-CM

## 2020-10-27 DIAGNOSIS — G89.29 CHRONIC BILATERAL LOW BACK PAIN WITH SCIATICA, SCIATICA LATERALITY UNSPECIFIED: Primary | ICD-10-CM

## 2020-10-27 DIAGNOSIS — M54.6 CHRONIC BILATERAL THORACIC BACK PAIN: ICD-10-CM

## 2020-10-27 DIAGNOSIS — R87.610 ATYPICAL SQUAMOUS CELLS OF UNDETERMINED SIGNIFICANCE ON CYTOLOGIC SMEAR OF CERVIX (ASC-US): ICD-10-CM

## 2020-10-27 DIAGNOSIS — I10 ESSENTIAL HYPERTENSION: ICD-10-CM

## 2020-10-27 DIAGNOSIS — G89.29 CHRONIC BILATERAL THORACIC BACK PAIN: ICD-10-CM

## 2020-10-27 DIAGNOSIS — G43.C0 PERIODIC HEADACHE SYNDROME, NOT INTRACTABLE: ICD-10-CM

## 2020-10-27 LAB
BILIRUB BLD-MCNC: NEGATIVE MG/DL
CLARITY, POC: CLEAR
COLOR UR: YELLOW
GLUCOSE UR STRIP-MCNC: NEGATIVE MG/DL
KETONES UR QL: NEGATIVE
LEUKOCYTE EST, POC: ABNORMAL
NITRITE UR-MCNC: NEGATIVE MG/ML
PH UR: 5.5 [PH] (ref 5–8)
PROT UR STRIP-MCNC: NEGATIVE MG/DL
RBC # UR STRIP: ABNORMAL /UL
SP GR UR: 1.03 (ref 1–1.03)
UROBILINOGEN UR QL: NORMAL

## 2020-10-27 PROCEDURE — G0463 HOSPITAL OUTPT CLINIC VISIT: HCPCS | Performed by: PHYSICAL MEDICINE & REHABILITATION

## 2020-10-27 PROCEDURE — 80053 COMPREHEN METABOLIC PANEL: CPT | Performed by: FAMILY MEDICINE

## 2020-10-27 PROCEDURE — 80061 LIPID PANEL: CPT | Performed by: FAMILY MEDICINE

## 2020-10-27 PROCEDURE — 82270 OCCULT BLOOD FECES: CPT | Performed by: FAMILY MEDICINE

## 2020-10-27 PROCEDURE — 81003 URINALYSIS AUTO W/O SCOPE: CPT | Performed by: FAMILY MEDICINE

## 2020-10-27 PROCEDURE — 99213 OFFICE O/P EST LOW 20 MIN: CPT | Performed by: PHYSICAL MEDICINE & REHABILITATION

## 2020-10-27 PROCEDURE — 99396 PREV VISIT EST AGE 40-64: CPT | Performed by: FAMILY MEDICINE

## 2020-10-27 RX ORDER — METHYLNALTREXONE BROMIDE 150 MG/1
3 TABLET ORAL DAILY
Qty: 90 TABLET | Refills: 2 | Status: SHIPPED | OUTPATIENT
Start: 2020-10-27 | End: 2021-01-14

## 2020-10-27 RX ORDER — HYDROCODONE BITARTRATE AND ACETAMINOPHEN 10; 325 MG/1; MG/1
1 TABLET ORAL EVERY 8 HOURS PRN
Qty: 120 TABLET | Refills: 0 | Status: SHIPPED | OUTPATIENT
Start: 2020-10-27 | End: 2020-10-27 | Stop reason: SDUPTHER

## 2020-10-27 RX ORDER — HYDROCODONE BITARTRATE AND ACETAMINOPHEN 10; 325 MG/1; MG/1
1 TABLET ORAL EVERY 8 HOURS PRN
Qty: 90 TABLET | Refills: 0 | Status: SHIPPED | OUTPATIENT
Start: 2020-10-27 | End: 2020-10-27 | Stop reason: SDUPTHER

## 2020-10-27 RX ORDER — HYDROCODONE BITARTRATE AND ACETAMINOPHEN 10; 325 MG/1; MG/1
1 TABLET ORAL EVERY 8 HOURS PRN
Qty: 90 TABLET | Refills: 0 | Status: SHIPPED | OUTPATIENT
Start: 2020-10-27 | End: 2021-01-26 | Stop reason: SDUPTHER

## 2020-10-27 RX ORDER — PHENTERMINE HYDROCHLORIDE 37.5 MG/1
37.5 CAPSULE ORAL EVERY MORNING
Qty: 30 CAPSULE | Refills: 0 | Status: SHIPPED | OUTPATIENT
Start: 2020-10-27 | End: 2020-11-12 | Stop reason: SDUPTHER

## 2020-10-27 NOTE — PROGRESS NOTES
Subjective   Nicki Fraser is a 55 y.o. female.     Neck and back pain, 10/10 at worst, 7/10 at best, always present, varies, aching, stabbing, worse with bending and lifting, interferes with ADLs, sleep, activity, failed PT, chiropractor, meds. MRI C-spine with DDD, stenosis. Saw Dr. Overton, had PT, given Norco 10mg TID prn, began receiving opioids from PCP, down to BID with less relief, PCP retiring, increased here to TID prn. Failed injections, surgery in past. Back pain worse after housework, no b/b incontinence, no new weakness or numbness. Worsening b/l thumb and great toe pain.       The following portions of the patient's history were reviewed and updated as appropriate: allergies, current medications, past family history, past medical history, past social history, past surgical history and problem list.    Review of Systems   Constitutional: Positive for fatigue. Negative for chills and fever.   HENT: Negative for hearing loss and trouble swallowing.    Eyes: Negative for visual disturbance.   Respiratory: Negative for shortness of breath.    Cardiovascular: Negative for chest pain.   Gastrointestinal: Positive for constipation and nausea. Negative for abdominal pain, diarrhea and vomiting.   Genitourinary: Negative for urinary incontinence.   Musculoskeletal: Positive for arthralgias, back pain, joint swelling and neck pain. Negative for myalgias.   Neurological: Positive for dizziness and headache. Negative for weakness and numbness.       Objective   Physical Exam   Constitutional: She is oriented to person, place, and time. She appears well-developed and well-nourished.   HENT:   Head: Normocephalic and atraumatic.   Eyes: Pupils are equal, round, and reactive to light.   Neck: Normal range of motion.   Cardiovascular: Normal rate, regular rhythm and normal heart sounds.   Pulmonary/Chest: Breath sounds normal.   Abdominal: Soft. Bowel sounds are normal. She exhibits no distension. There is  no abdominal tenderness.   Neurological: She is alert and oriented to person, place, and time. She has normal reflexes. She displays normal reflexes. No sensory deficit.   Psychiatric: Her behavior is normal. Thought content normal.         Assessment/Plan   Diagnoses and all orders for this visit:    1. Chronic bilateral low back pain with sciatica, sciatica laterality unspecified (Primary)    2. Chronic bilateral thoracic back pain    3. Chronic pain disorder    4. Fibromyalgia    5. Myalgia    6. Cervical radiculopathy    7. Cervical spinal stenosis      Inspect reviewed, in order. UDS in order 7/31/20.  Treatment plan will consist of continuing current medication as long as it remains effective and is necessary, while evaluating patient at each visit and determining if the medication can be lowered or discontinued, while also using nonopioid therapies to reduce reliance on opioids.  Increased to Norco 10mg QID prn, then TID prn, continue, doing well, denies side effects.  Began Tizanidine 4mg TID prn.  Began Lyrica 50mg BID, cannot tolerate Gabapentin.  Failed injections, surgery.  Insurance will not cover comp cream. Cont Voltaren.  Pt will ask PCP for referral to allergist for possible bee sting allergy.  RTC 3 months for f/u.

## 2020-10-27 NOTE — PROGRESS NOTES
Subjective   Nicki Fraser is a 55 y.o. female.     Chief Complaint   Patient presents with   • Annual Exam   • Abnormal Pap Smear         Current Outpatient Medications:   •  albuterol (PROVENTIL) (2.5 MG/3ML) 0.083% nebulizer solution, Take 2.5 mg by nebulization Every 4 (Four) Hours As Needed for Wheezing., Disp: , Rfl:   •  albuterol sulfate  (90 Base) MCG/ACT inhaler, Inhale 2 puffs Every 4 (Four) Hours As Needed for Wheezing., Disp: 18 g, Rfl: 0  •  aspirin 325 MG tablet, Take 325 mg by mouth Daily., Disp: , Rfl:   •  Cholecalciferol (EQL VITAMIN D3) 2000 units capsule, Take 1 capsule by mouth Daily., Disp: , Rfl:   •  cyclobenzaprine (FLEXERIL) 10 MG tablet, Take 1 tablet by mouth At Night As Needed for Muscle Spasms., Disp: 30 tablet, Rfl: 0  •  dextromethorphan-guaifenesin (MUCINEX DM MAX STRENGTH)  MG per 12 hr tablet, Take 1 tablet by mouth 2 (Two) Times a Day As Needed., Disp: , Rfl:   •  diclofenac (VOLTAREN) 1 % gel gel, Apply 4 g topically to the appropriate area as directed 4 (Four) Times a Day As Needed (joint pain)., Disp: 5 tube, Rfl: 11  •  EPINEPHrine (EPIPEN) 0.3 MG/0.3ML solution auto-injector injection, Inject 0.3 mL under the skin into the appropriate area as directed As Needed (allergic reaction)., Disp: 1 each, Rfl: 0  •  fluticasone (FLONASE) 50 MCG/ACT nasal spray, 2 sprays into the nostril(s) as directed by provider Daily., Disp: 1 bottle, Rfl: 0  •  HYDROcodone-acetaminophen (Norco)  MG per tablet, Take 1 tablet by mouth Every 8 (Eight) Hours As Needed for Moderate Pain ., Disp: 120 tablet, Rfl: 0  •  loratadine (CLARITIN) 10 MG tablet, Take 10 mg by mouth Daily., Disp: , Rfl:   •  montelukast (SINGULAIR) 10 MG tablet, TAKE ONE TABLET BY MOUTH DAILY, Disp: 90 tablet, Rfl: 0  •  phentermine 37.5 MG capsule, Take 1 capsule by mouth Every Morning., Disp: 30 capsule, Rfl: 0  •  polyethylene glycol (MIRALAX) 17 GM/SCOOP powder, Take 17 g by mouth Daily.,  Disp: 500 g, Rfl: 0  •  tiZANidine (ZANAFLEX) 4 MG tablet, Take 1 tablet by mouth Every 8 (Eight) Hours As Needed for Muscle Spasms., Disp: 90 tablet, Rfl: 11  •  topiramate (TOPAMAX) 50 MG tablet, Take 1 tablet by mouth Daily., Disp: 90 tablet, Rfl: 0  •  triamterene-hydrochlorothiazide (DYAZIDE) 37.5-25 MG per capsule, Take 1 capsule by mouth Every Morning., Disp: 90 capsule, Rfl: 0  •  ibuprofen (ADVIL,MOTRIN) 800 MG tablet, TAKE ONE TABLET BY MOUTH EVERY 8 HOURS, Disp: 90 tablet, Rfl: 0  •  Methylnaltrexone Bromide (Relistor) 150 MG tablet, Take 450 mg by mouth Daily., Disp: 90 tablet, Rfl: 2    Past Medical History:   Diagnosis Date   • Allergic    • Cervical radiculopathy    • DDD (degenerative disc disease), cervical    • Fibromyalgia    • Herniated disc, cervical     Abstraction from [employer] Herniated Disc; Cervical Degenerated disc.   • Hypertension    • Left shoulder pain    • MAMMO     NEG = 2020   • Migraine headache    • Myalgia    • Obesity    • PAP     ASCUS = 2019   • Peripheral edema        Past Surgical History:   Procedure Laterality Date   •  SECTION     • COLONOSCOPY      COLOGUARD ---NEG = , rech    • LYMPH NODE DISSECTION      cervical node= NEG   • TUBAL ABDOMINAL LIGATION         Family History   Problem Relation Age of Onset   • Heart disease Mother    • Osteoporosis Mother    • Hypertension Father    • Kidney disease Father         KIDNEY STONES   • Stroke Father    • Arthritis Brother    • Diabetes Brother    • Heart disease Brother    • Hypertension Brother        Social History     Socioeconomic History   • Marital status:      Spouse name: Not on file   • Number of children: Not on file   • Years of education: Not on file   • Highest education level: Not on file   Tobacco Use   • Smoking status: Current Every Day Smoker     Packs/day: 1.00     Years: 30.00     Pack years: 30.00     Types: Cigarettes   • Smokeless tobacco: Never Used   Substance and  Sexual Activity   • Alcohol use: Yes     Comment: occ   • Drug use: No   • Sexual activity: Never       56 y/o C female here for annual PE w/ Pap    Pt did cologuard 2019 and NEG  Declines flu shot   Mammo NEG= 2020       The following portions of the patient's history were reviewed and updated as appropriate: allergies, current medications, past family history, past medical history, past social history, past surgical history and problem list.    Review of Systems   Constitutional: Positive for unexpected weight gain. Negative for activity change, appetite change, fatigue and unexpected weight loss.   HENT: Negative for congestion, ear pain, hearing loss, nosebleeds, postnasal drip, rhinorrhea, sinus pressure, sneezing, sore throat, tinnitus and trouble swallowing.    Eyes: Negative for blurred vision and double vision.   Respiratory: Negative for cough and shortness of breath.    Cardiovascular: Negative for chest pain.   Gastrointestinal: Positive for constipation. Negative for abdominal pain, blood in stool, diarrhea, nausea, vomiting, GERD and indigestion.   Genitourinary: Negative for breast discharge, breast lump, breast pain, difficulty urinating, dysuria, flank pain, frequency, urgency and urinary incontinence.   Musculoskeletal: Negative for arthralgias and myalgias.   Skin: Negative for rash and skin lesions.   Neurological: Negative for weakness, memory problem and confusion.   Psychiatric/Behavioral: Negative for agitation, self-injury, suicidal ideas, depressed mood and stress. The patient is not nervous/anxious.        Vitals:    10/27/20 0910   BP: 135/84   Pulse: 96   Resp: 16   Temp: 97.1 °F (36.2 °C)   SpO2: 98%       Objective   Physical Exam  Vitals signs and nursing note reviewed.   Constitutional:       General: She is not in acute distress.     Appearance: Normal appearance. She is well-developed. She is obese. She is not ill-appearing or toxic-appearing.   HENT:      Head: Normocephalic and  atraumatic.      Right Ear: Tympanic membrane, ear canal and external ear normal. There is no impacted cerumen.      Left Ear: Tympanic membrane, ear canal and external ear normal. There is no impacted cerumen.      Nose: Nose normal. No congestion or rhinorrhea.      Mouth/Throat:      Mouth: Mucous membranes are moist.      Pharynx: Oropharynx is clear. No oropharyngeal exudate or posterior oropharyngeal erythema.   Eyes:      Extraocular Movements: Extraocular movements intact.      Conjunctiva/sclera: Conjunctivae normal.      Pupils: Pupils are equal, round, and reactive to light.   Neck:      Musculoskeletal: Normal range of motion and neck supple.      Thyroid: No thyromegaly.   Cardiovascular:      Rate and Rhythm: Normal rate and regular rhythm.      Pulses: Normal pulses.      Heart sounds: Normal heart sounds. No murmur.   Pulmonary:      Effort: Pulmonary effort is normal. No respiratory distress.      Breath sounds: Normal breath sounds. No stridor. No wheezing or rales.   Chest:      Breasts:         Right: Normal. No swelling, inverted nipple, mass, nipple discharge, skin change or tenderness.         Left: Normal. No swelling, inverted nipple, mass, nipple discharge, skin change or tenderness.   Abdominal:      General: Bowel sounds are normal. There is no distension.      Palpations: Abdomen is soft. There is no mass.      Tenderness: There is no abdominal tenderness. There is no guarding or rebound.      Hernia: No hernia is present. There is no hernia in the left inguinal area or right inguinal area.   Genitourinary:     General: Normal vulva.      Exam position: Supine.      Labia:         Right: No rash or lesion.         Left: No rash or lesion.       Vagina: Normal. No foreign body. No vaginal discharge, erythema, tenderness, bleeding or lesions.      Cervix: No cervical motion tenderness, discharge, friability, lesion or eversion.      Uterus: Normal.       Adnexa:         Right: No mass,  tenderness or fullness.          Left: No mass, tenderness or fullness.        Rectum: Normal. Guaiac result negative.   Musculoskeletal: Normal range of motion.         General: No tenderness.      Right lower leg: No edema.      Left lower leg: No edema.   Lymphadenopathy:      Cervical: No cervical adenopathy.      Upper Body:      Right upper body: No supraclavicular or axillary adenopathy.      Left upper body: No supraclavicular or axillary adenopathy.   Skin:     General: Skin is warm and dry.      Capillary Refill: Capillary refill takes less than 2 seconds.      Findings: No erythema, lesion or rash.   Neurological:      General: No focal deficit present.      Mental Status: She is alert and oriented to person, place, and time.      Cranial Nerves: No cranial nerve deficit.      Motor: No abnormal muscle tone.      Deep Tendon Reflexes: Reflexes normal.   Psychiatric:         Attention and Perception: Attention and perception normal.         Mood and Affect: Mood and affect normal.         Speech: Speech normal.         Behavior: Behavior normal. Behavior is cooperative.         Thought Content: Thought content normal.         Cognition and Memory: Cognition and memory normal.         Judgment: Judgment normal.           Assessment/Plan   Diagnoses and all orders for this visit:    1. Encounter for annual wellness exam in Medicare patient (Primary)  -     POC Urinalysis Dipstick, Automated    2. Atypical squamous cells of undetermined significance on cytologic smear of cervix (ASC-US)    3. Essential hypertension  -     Comprehensive Metabolic Panel    4. Screening for lipid disorders  -     Lipid Panel    5. Chronic constipation    6. Periodic headache syndrome, not intractable    7. Class 2 severe obesity due to excess calories with serious comorbidity and body mass index (BMI) of 36.0 to 36.9 in adult (CMS/[facility])  -     phentermine 37.5 MG capsule; Take 1 capsule by mouth Every Morning.  Dispense: 30  capsule; Refill: 0    Other orders  -     Methylnaltrexone Bromide (Relistor) 150 MG tablet; Take 450 mg by mouth Daily.  Dispense: 90 tablet; Refill: 2

## 2020-10-28 LAB
ALBUMIN SERPL-MCNC: 4.3 G/DL (ref 3.5–5.2)
ALBUMIN/GLOB SERPL: 1.5 G/DL
ALP SERPL-CCNC: 112 U/L (ref 39–117)
ALT SERPL W P-5'-P-CCNC: 28 U/L (ref 1–33)
ANION GAP SERPL CALCULATED.3IONS-SCNC: 16.7 MMOL/L (ref 5–15)
AST SERPL-CCNC: 25 U/L (ref 1–32)
BILIRUB SERPL-MCNC: 0.2 MG/DL (ref 0–1.2)
BUN SERPL-MCNC: 31 MG/DL (ref 6–20)
BUN/CREAT SERPL: 20.3 (ref 7–25)
CALCIUM SPEC-SCNC: 9.5 MG/DL (ref 8.6–10.5)
CHLORIDE SERPL-SCNC: 99 MMOL/L (ref 98–107)
CHOLEST SERPL-MCNC: 179 MG/DL (ref 0–200)
CO2 SERPL-SCNC: 24.3 MMOL/L (ref 22–29)
CREAT SERPL-MCNC: 1.53 MG/DL (ref 0.57–1)
GFR SERPL CREATININE-BSD FRML MDRD: 35 ML/MIN/1.73
GLOBULIN UR ELPH-MCNC: 2.9 GM/DL
GLUCOSE SERPL-MCNC: 89 MG/DL (ref 65–99)
HDLC SERPL-MCNC: 63 MG/DL (ref 40–60)
LDLC SERPL CALC-MCNC: 99 MG/DL (ref 0–100)
LDLC/HDLC SERPL: 1.55 {RATIO}
POTASSIUM SERPL-SCNC: 3.4 MMOL/L (ref 3.5–5.2)
PROT SERPL-MCNC: 7.2 G/DL (ref 6–8.5)
SODIUM SERPL-SCNC: 140 MMOL/L (ref 136–145)
TRIGL SERPL-MCNC: 91 MG/DL (ref 0–150)
VLDLC SERPL-MCNC: 17 MG/DL (ref 5–40)

## 2020-11-02 LAB
AGE GDLN ACOG TESTING: NORMAL
CYTOLOGIST CVX/VAG CYTO: NORMAL
CYTOLOGY CVX/VAG DOC CYTO: NORMAL
CYTOLOGY CVX/VAG DOC THIN PREP: NORMAL
DX ICD CODE: NORMAL
HIV 1 & 2 AB SER-IMP: NORMAL
HPV I/H RISK 1 DNA CVX QL PROBE+SIG AMP: NEGATIVE
Lab: NORMAL
OTHER STN SPEC: NORMAL
STAT OF ADQ CVX/VAG CYTO-IMP: NORMAL

## 2020-11-04 DIAGNOSIS — E66.01 CLASS 2 SEVERE OBESITY DUE TO EXCESS CALORIES WITH SERIOUS COMORBIDITY AND BODY MASS INDEX (BMI) OF 36.0 TO 36.9 IN ADULT (HCC): ICD-10-CM

## 2020-11-04 RX ORDER — PHENTERMINE HYDROCHLORIDE 37.5 MG/1
CAPSULE ORAL
Qty: 30 CAPSULE | Refills: 0 | OUTPATIENT
Start: 2020-11-04

## 2020-11-04 NOTE — TELEPHONE ENCOUNTER
Last visit:  10/27/20  Next visit: none  Last labs: 10/27/20    Rx requested: Phentermine   Ph[facility]acy: [facility] [place]

## 2020-11-05 DIAGNOSIS — I10 ESSENTIAL HYPERTENSION: Primary | ICD-10-CM

## 2020-11-05 DIAGNOSIS — N28.9 RENAL INSUFFICIENCY: ICD-10-CM

## 2020-11-05 RX ORDER — AMLODIPINE BESYLATE 2.5 MG/1
2.5 TABLET ORAL DAILY
Qty: 30 TABLET | Refills: 1 | Status: SHIPPED | OUTPATIENT
Start: 2020-11-05 | End: 2020-11-20

## 2020-11-08 DIAGNOSIS — E66.01 CLASS 2 SEVERE OBESITY DUE TO EXCESS CALORIES WITH SERIOUS COMORBIDITY AND BODY MASS INDEX (BMI) OF 36.0 TO 36.9 IN ADULT (HCC): ICD-10-CM

## 2020-11-09 RX ORDER — PHENTERMINE HYDROCHLORIDE 37.5 MG/1
CAPSULE ORAL
Qty: 30 CAPSULE | Refills: 0 | OUTPATIENT
Start: 2020-11-09

## 2020-11-10 DIAGNOSIS — E66.01 CLASS 2 SEVERE OBESITY DUE TO EXCESS CALORIES WITH SERIOUS COMORBIDITY AND BODY MASS INDEX (BMI) OF 36.0 TO 36.9 IN ADULT (HCC): ICD-10-CM

## 2020-11-11 ENCOUNTER — TELEPHONE (OUTPATIENT)
Dept: FAMILY MEDICINE CLINIC | Facility: CLINIC | Age: 55
End: 2020-11-11

## 2020-11-11 RX ORDER — PHENTERMINE HYDROCHLORIDE 37.5 MG/1
37.5 CAPSULE ORAL EVERY MORNING
Qty: 30 CAPSULE | Refills: 0 | OUTPATIENT
Start: 2020-11-11

## 2020-11-11 NOTE — TELEPHONE ENCOUNTER
[facility] IS REQUESTING OFFICE NOTES PRIOR TO SEPT 2019    PLEASE [phone]

## 2020-11-12 DIAGNOSIS — E66.01 CLASS 2 SEVERE OBESITY DUE TO EXCESS CALORIES WITH SERIOUS COMORBIDITY AND BODY MASS INDEX (BMI) OF 36.0 TO 36.9 IN ADULT (HCC): ICD-10-CM

## 2020-11-12 RX ORDER — PHENTERMINE HYDROCHLORIDE 37.5 MG/1
37.5 CAPSULE ORAL EVERY MORNING
Qty: 30 CAPSULE | Refills: 0 | Status: SHIPPED | OUTPATIENT
Start: 2020-11-12 | End: 2020-12-07

## 2020-11-12 RX ORDER — POLYETHYLENE GLYCOL 3350 17 G/17G
POWDER ORAL
Qty: 507 G | Refills: 0 | Status: SHIPPED | OUTPATIENT
Start: 2020-11-12 | End: 2020-12-07

## 2020-11-13 RX ORDER — POLYETHYLENE GLYCOL 3350 17 G/17G
POWDER ORAL
Refills: 3 | OUTPATIENT
Start: 2020-11-13

## 2020-11-19 RX ORDER — MONTELUKAST SODIUM 10 MG/1
TABLET ORAL
Qty: 90 TABLET | Refills: 2 | Status: SHIPPED | OUTPATIENT
Start: 2020-11-19 | End: 2021-10-28

## 2020-11-20 ENCOUNTER — CLINICAL SUPPORT (OUTPATIENT)
Dept: FAMILY MEDICINE CLINIC | Facility: CLINIC | Age: 55
End: 2020-11-20

## 2020-11-20 VITALS
HEART RATE: 93 BPM | SYSTOLIC BLOOD PRESSURE: 142 MMHG | DIASTOLIC BLOOD PRESSURE: 80 MMHG | BODY MASS INDEX: 37.1 KG/M2 | WEIGHT: 244 LBS

## 2020-11-20 RX ORDER — CLONIDINE HYDROCHLORIDE 0.1 MG/1
0.1 TABLET ORAL 2 TIMES DAILY
Qty: 60 TABLET | Refills: 0 | Status: SHIPPED | OUTPATIENT
Start: 2020-11-20 | End: 2020-12-21

## 2020-11-20 NOTE — PROGRESS NOTES
Our cuff - 142/80 hr 93   (taken meds 45 min ago, smoked 15 min prior to BP check)  Pt wrist cuff - 164/94 hr 96    Pt noted still having ankle/hand swelling/ bloating/pain since starting the Norvasc 2.5 qd

## 2020-11-23 RX ORDER — ATENOLOL 25 MG/1
TABLET ORAL
Qty: 90 TABLET | Refills: 0 | OUTPATIENT
Start: 2020-11-23

## 2020-11-24 RX ORDER — IBUPROFEN 800 MG/1
TABLET ORAL
Qty: 90 TABLET | Refills: 0 | Status: SHIPPED | OUTPATIENT
Start: 2020-11-24 | End: 2020-12-28

## 2020-12-07 DIAGNOSIS — E66.01 CLASS 2 SEVERE OBESITY DUE TO EXCESS CALORIES WITH SERIOUS COMORBIDITY AND BODY MASS INDEX (BMI) OF 36.0 TO 36.9 IN ADULT (HCC): ICD-10-CM

## 2020-12-07 RX ORDER — POLYETHYLENE GLYCOL 3350 17 G/17G
POWDER ORAL
Qty: 289 G | Refills: 0 | Status: SHIPPED | OUTPATIENT
Start: 2020-12-07 | End: 2020-12-28

## 2020-12-07 RX ORDER — PHENTERMINE HYDROCHLORIDE 37.5 MG/1
CAPSULE ORAL
Qty: 30 CAPSULE | Refills: 0 | Status: SHIPPED | OUTPATIENT
Start: 2020-12-07 | End: 2021-01-14 | Stop reason: SDUPTHER

## 2020-12-07 RX ORDER — CYCLOBENZAPRINE HCL 10 MG
TABLET ORAL
Qty: 30 TABLET | Refills: 0 | Status: SHIPPED | OUTPATIENT
Start: 2020-12-07 | End: 2021-10-12

## 2020-12-21 RX ORDER — CLONIDINE HYDROCHLORIDE 0.1 MG/1
TABLET ORAL
Qty: 60 TABLET | Refills: 0 | Status: SHIPPED | OUTPATIENT
Start: 2020-12-21 | End: 2021-01-14 | Stop reason: SDUPTHER

## 2020-12-28 RX ORDER — IBUPROFEN 800 MG/1
TABLET ORAL
Qty: 90 TABLET | Refills: 0 | Status: SHIPPED | OUTPATIENT
Start: 2020-12-28 | End: 2021-02-15 | Stop reason: SDUPTHER

## 2020-12-28 RX ORDER — POLYETHYLENE GLYCOL 3350 17 G/17G
POWDER ORAL
Qty: 507 G | Refills: 3 | Status: SHIPPED | OUTPATIENT
Start: 2020-12-28 | End: 2021-02-15 | Stop reason: SDUPTHER

## 2021-01-05 PROCEDURE — U0003 INFECTIOUS AGENT DETECTION BY NUCLEIC ACID (DNA OR RNA); SEVERE ACUTE RESPIRATORY SYNDROME CORONAVIRUS 2 (SARS-COV-2) (CORONAVIRUS DISEASE [COVID-19]), AMPLIFIED PROBE TECHNIQUE, MAKING USE OF HIGH THROUGHPUT TECHNOLOGIES AS DESCRIBED BY CMS-2020-01-R: HCPCS | Performed by: FAMILY MEDICINE

## 2021-01-14 ENCOUNTER — OFFICE VISIT (OUTPATIENT)
Dept: FAMILY MEDICINE CLINIC | Facility: CLINIC | Age: 56
End: 2021-01-14

## 2021-01-14 DIAGNOSIS — I10 ELEVATED BLOOD PRESSURE READING WITH DIAGNOSIS OF HYPERTENSION: ICD-10-CM

## 2021-01-14 DIAGNOSIS — E66.01 CLASS 2 SEVERE OBESITY DUE TO EXCESS CALORIES WITH SERIOUS COMORBIDITY AND BODY MASS INDEX (BMI) OF 36.0 TO 36.9 IN ADULT (HCC): ICD-10-CM

## 2021-01-14 DIAGNOSIS — R05.3 PERSISTENT COUGH FOR 3 WEEKS OR LONGER: Primary | ICD-10-CM

## 2021-01-14 PROCEDURE — 99214 OFFICE O/P EST MOD 30 MIN: CPT | Performed by: FAMILY MEDICINE

## 2021-01-14 PROCEDURE — U0003 INFECTIOUS AGENT DETECTION BY NUCLEIC ACID (DNA OR RNA); SEVERE ACUTE RESPIRATORY SYNDROME CORONAVIRUS 2 (SARS-COV-2) (CORONAVIRUS DISEASE [COVID-19]), AMPLIFIED PROBE TECHNIQUE, MAKING USE OF HIGH THROUGHPUT TECHNOLOGIES AS DESCRIBED BY CMS-2020-01-R: HCPCS | Performed by: FAMILY MEDICINE

## 2021-01-14 RX ORDER — PHENTERMINE HYDROCHLORIDE 37.5 MG/1
37.5 CAPSULE ORAL EVERY MORNING
Qty: 30 CAPSULE | Refills: 0 | Status: SHIPPED | OUTPATIENT
Start: 2021-01-14 | End: 2021-02-15

## 2021-01-14 RX ORDER — MULTIPLE VITAMINS W/ MINERALS TAB 9MG-400MCG
1 TAB ORAL DAILY
COMMUNITY
End: 2021-04-08

## 2021-01-14 RX ORDER — CLONIDINE HYDROCHLORIDE 0.1 MG/1
0.1 TABLET ORAL 2 TIMES DAILY
Qty: 60 TABLET | Refills: 1 | Status: SHIPPED | OUTPATIENT
Start: 2021-01-14 | End: 2021-04-08

## 2021-01-16 LAB — SARS-COV-2 RNA RESP QL NAA+PROBE: NOT DETECTED

## 2021-01-18 DIAGNOSIS — I10 ESSENTIAL HYPERTENSION: Primary | ICD-10-CM

## 2021-01-18 RX ORDER — METHYLPREDNISOLONE 4 MG/1
TABLET ORAL
Qty: 21 TABLET | Refills: 0 | Status: SHIPPED | OUTPATIENT
Start: 2021-01-18 | End: 2021-01-26

## 2021-01-25 RX ORDER — TRIAMTERENE AND HYDROCHLOROTHIAZIDE 37.5; 25 MG/1; MG/1
CAPSULE ORAL
Qty: 30 CAPSULE | Refills: 0 | OUTPATIENT
Start: 2021-01-25

## 2021-01-25 RX ORDER — IBUPROFEN 800 MG/1
TABLET ORAL
Qty: 90 TABLET | Refills: 0 | OUTPATIENT
Start: 2021-01-25

## 2021-01-25 NOTE — TELEPHONE ENCOUNTER
Last visit:  1/14/21  Next visit: none  Last labs:11/5/20    Rx requested: Ibuprofen,Dyazide   Ph[facility]acy: [facility] [place]

## 2021-01-26 ENCOUNTER — OFFICE VISIT (OUTPATIENT)
Dept: PAIN MEDICINE | Facility: CLINIC | Age: 56
End: 2021-01-26

## 2021-01-26 VITALS
DIASTOLIC BLOOD PRESSURE: 80 MMHG | HEART RATE: 90 BPM | HEIGHT: 68 IN | WEIGHT: 240 LBS | RESPIRATION RATE: 16 BRPM | BODY MASS INDEX: 36.37 KG/M2 | SYSTOLIC BLOOD PRESSURE: 168 MMHG | OXYGEN SATURATION: 98 % | TEMPERATURE: 97.7 F

## 2021-01-26 DIAGNOSIS — M54.6 CHRONIC BILATERAL THORACIC BACK PAIN: ICD-10-CM

## 2021-01-26 DIAGNOSIS — M54.2 CERVICALGIA: ICD-10-CM

## 2021-01-26 DIAGNOSIS — G89.29 CHRONIC BILATERAL LOW BACK PAIN WITH SCIATICA, SCIATICA LATERALITY UNSPECIFIED: Primary | ICD-10-CM

## 2021-01-26 DIAGNOSIS — G89.29 CHRONIC BILATERAL THORACIC BACK PAIN: ICD-10-CM

## 2021-01-26 DIAGNOSIS — M54.12 CERVICAL RADICULOPATHY: ICD-10-CM

## 2021-01-26 DIAGNOSIS — M54.40 CHRONIC BILATERAL LOW BACK PAIN WITH SCIATICA, SCIATICA LATERALITY UNSPECIFIED: Primary | ICD-10-CM

## 2021-01-26 DIAGNOSIS — M79.7 FIBROMYALGIA: ICD-10-CM

## 2021-01-26 DIAGNOSIS — M48.02 CERVICAL SPINAL STENOSIS: ICD-10-CM

## 2021-01-26 DIAGNOSIS — M79.10 MYALGIA: ICD-10-CM

## 2021-01-26 DIAGNOSIS — G89.4 CHRONIC PAIN DISORDER: ICD-10-CM

## 2021-01-26 PROCEDURE — 99213 OFFICE O/P EST LOW 20 MIN: CPT | Performed by: PHYSICAL MEDICINE & REHABILITATION

## 2021-01-26 RX ORDER — HYDROCODONE BITARTRATE AND ACETAMINOPHEN 10; 325 MG/1; MG/1
1 TABLET ORAL EVERY 8 HOURS PRN
Qty: 90 TABLET | Refills: 0 | Status: SHIPPED | OUTPATIENT
Start: 2021-01-26 | End: 2021-02-15 | Stop reason: SDUPTHER

## 2021-01-26 RX ORDER — HYDROCODONE BITARTRATE AND ACETAMINOPHEN 10; 325 MG/1; MG/1
1 TABLET ORAL EVERY 8 HOURS PRN
Qty: 90 TABLET | Refills: 0 | Status: SHIPPED | OUTPATIENT
Start: 2021-01-26 | End: 2021-04-20 | Stop reason: SDUPTHER

## 2021-01-26 NOTE — PROGRESS NOTES
Subjective   Nicki Fraser is a 55 y.o. female.     Neck and back pain, 10/10 at worst, 7/10 at best, always present, varies, aching, stabbing, worse with bending and lifting, interferes with ADLs, sleep, activity, failed PT, chiropractor, meds. MRI C-spine with DDD, stenosis. Saw Dr. Overton, had PT, given Norco 10mg TID prn, began receiving opioids from PCP, down to BID with less relief, PCP retiring, increased here to TID prn. Failed injections, surgery in past. Back pain worse after housework, no b/b incontinence, no new weakness or numbness. Worsening b/l thumb and great toe pain.       The following portions of the patient's history were reviewed and updated as appropriate: allergies, current medications, past family history, past medical history, past social history, past surgical history and problem list.    Review of Systems   Constitutional: Positive for fatigue. Negative for chills and fever.   HENT: Negative for hearing loss and trouble swallowing.    Eyes: Negative for visual disturbance.   Respiratory: Negative for shortness of breath.    Cardiovascular: Negative for chest pain.   Gastrointestinal: Positive for constipation and nausea. Negative for abdominal pain, diarrhea and vomiting.   Genitourinary: Negative for urinary incontinence.   Musculoskeletal: Positive for arthralgias, back pain, joint swelling and neck pain. Negative for myalgias.   Neurological: Positive for dizziness and headache. Negative for weakness and numbness.       Objective   Physical Exam   Constitutional: She is oriented to person, place, and time. She appears well-developed and well-nourished.   HENT:   Head: Normocephalic and atraumatic.   Eyes: Pupils are equal, round, and reactive to light.   Neck: Normal range of motion.   Cardiovascular: Normal rate, regular rhythm and normal heart sounds.   Pulmonary/Chest: Breath sounds normal.   Abdominal: Soft. Bowel sounds are normal. She exhibits no distension. There is  no abdominal tenderness.   Neurological: She is alert and oriented to person, place, and time. She has normal reflexes. She displays normal reflexes. No sensory deficit.   Psychiatric: Her behavior is normal. Thought content normal.         Assessment/Plan   Diagnoses and all orders for this visit:    1. Chronic bilateral low back pain with sciatica, sciatica laterality unspecified (Primary)    2. Chronic bilateral thoracic back pain    3. Chronic pain disorder    4. Cervicalgia    5. Cervical spinal stenosis    6. Cervical radiculopathy    7. Fibromyalgia    8. Myalgia      Inspect reviewed, in order. UDS in order 7/31/20.  Treatment plan will consist of continuing current medication as long as it remains effective and is necessary, while evaluating patient at each visit and determining if the medication can be lowered or discontinued, while also using nonopioid therapies to reduce reliance on opioids.  Increased to Norco 10mg QID prn, then TID prn, continue, doing well, denies side effects.  Began Tizanidine 4mg TID prn.  Began Lyrica 50mg BID, cannot tolerate Gabapentin.  Failed injections, surgery.  Insurance will not cover comp cream. Cont Voltaren.  Pt will ask PCP for referral to allergist for possible bee sting allergy.  RTC 3 months for f/u.

## 2021-01-29 ENCOUNTER — OFFICE VISIT (OUTPATIENT)
Dept: CARDIOLOGY | Facility: CLINIC | Age: 56
End: 2021-01-29

## 2021-01-29 VITALS
BODY MASS INDEX: 36.98 KG/M2 | OXYGEN SATURATION: 98 % | WEIGHT: 244 LBS | TEMPERATURE: 97.6 F | SYSTOLIC BLOOD PRESSURE: 146 MMHG | DIASTOLIC BLOOD PRESSURE: 80 MMHG | HEIGHT: 68 IN | HEART RATE: 90 BPM

## 2021-01-29 DIAGNOSIS — E78.00 PURE HYPERCHOLESTEROLEMIA: ICD-10-CM

## 2021-01-29 DIAGNOSIS — I10 ESSENTIAL HYPERTENSION: ICD-10-CM

## 2021-01-29 DIAGNOSIS — R06.02 SHORTNESS OF BREATH: Primary | ICD-10-CM

## 2021-01-29 PROCEDURE — 93000 ELECTROCARDIOGRAM COMPLETE: CPT | Performed by: INTERNAL MEDICINE

## 2021-01-29 PROCEDURE — 99204 OFFICE O/P NEW MOD 45 MIN: CPT | Performed by: INTERNAL MEDICINE

## 2021-01-29 NOTE — PROGRESS NOTES
"    Subjective:     Encounter Date:2021      Patient ID: Nicki Fraser is a 55 y.o. female.    Chief Complaint:  History of Present Illness 55-year-old white female with history of hypertension hyperlipidemia and possibly obstructive sleep apnea presents to my office for a new consultation.  Patient has been having increasing episodes of shortness of breath especially with exertion relieved with rest.  She also has been having some dizziness.  She has some swelling of the feet.  No complains of any chest pain.  No palpitations or syncope.  She still continues to smoke patient does not exercise very well.  She has strong family still coronary disease.    The following portions of the patient's history were reviewed and updated as appropriate: allergies, current medications, past family history, past medical history, past social history, past surgical history and problem list.  Past Medical History:   Diagnosis Date   • Allergic    • Cervical radiculopathy    • DDD (degenerative disc disease), cervical    • Fibromyalgia    • Herniated disc, cervical     Abstraction from [employer] Herniated Disc; Cervical Degenerated disc.   • Hypertension    • Left shoulder pain    • MAMMO     NEG = 2020   • Migraine headache    • Myalgia    • Obesity    • PAP     ASCUS = 2019   • Peripheral edema      Past Surgical History:   Procedure Laterality Date   •  SECTION     • COLONOSCOPY      COLOGUARD ---NEG = , rech    • LYMPH NODE DISSECTION      cervical node= NEG   • TUBAL ABDOMINAL LIGATION       /80 (BP Location: Left arm, Patient Position: Sitting)   Pulse 90   Temp 97.6 °F (36.4 °C)   Ht 172.7 cm (68\")   Wt 111 kg (244 lb)   SpO2 98%   BMI 37.10 kg/m²   Family History   Problem Relation Age of Onset   • Heart disease Mother    • Osteoporosis Mother    • Hypertension Father    • Kidney disease Father         KIDNEY STONES   • Stroke Father    • Heart disease Father    • Arthritis " Brother    • Diabetes Brother    • Heart disease Brother    • Hypertension Brother        Current Outpatient Medications:   •  albuterol (PROVENTIL) (2.5 MG/3ML) 0.083% nebulizer solution, Take 2.5 mg by nebulization Every 4 (Four) Hours As Needed for Wheezing., Disp: , Rfl:   •  albuterol sulfate  (90 Base) MCG/ACT inhaler, Inhale 2 puffs Every 4 (Four) Hours As Needed for Wheezing., Disp: 18 g, Rfl: 0  •  aspirin 325 MG tablet, Take 325 mg by mouth Daily., Disp: , Rfl:   •  Cholecalciferol (EQL VITAMIN D3) 2000 units capsule, Take 1 capsule by mouth Daily., Disp: , Rfl:   •  cloNIDine (CATAPRES) 0.1 MG tablet, Take 1 tablet by mouth 2 (Two) Times a Day., Disp: 60 tablet, Rfl: 1  •  cyclobenzaprine (FLEXERIL) 10 MG tablet, TAKE ONE TABLET BY MOUTH EVERY NIGHT AT BEDTIME AS NEEDED FOR MUSCLE SPASMS, Disp: 30 tablet, Rfl: 0  •  dextromethorphan-guaifenesin (MUCINEX DM MAX STRENGTH)  MG per 12 hr tablet, Take 1 tablet by mouth 2 (Two) Times a Day As Needed., Disp: , Rfl:   •  diclofenac (VOLTAREN) 1 % gel gel, Apply 4 g topically to the appropriate area as directed 4 (Four) Times a Day As Needed (joint pain)., Disp: 5 tube, Rfl: 11  •  EPINEPHrine (EPIPEN) 0.3 MG/0.3ML solution auto-injector injection, Inject 0.3 mL under the skin into the appropriate area as directed As Needed (allergic reaction)., Disp: 1 each, Rfl: 0  •  fluticasone (FLONASE) 50 MCG/ACT nasal spray, 2 sprays into the nostril(s) as directed by provider Daily., Disp: 1 bottle, Rfl: 0  •  HYDROcodone-acetaminophen (Norco)  MG per tablet, Take 1 tablet by mouth Every 8 (Eight) Hours As Needed for Moderate Pain ., Disp: 90 tablet, Rfl: 0  •  HYDROcodone-acetaminophen (Norco)  MG per tablet, Take 1 tablet by mouth Every 8 (Eight) Hours As Needed for Moderate Pain ., Disp: 90 tablet, Rfl: 0  •  HYDROcodone-acetaminophen (Norco)  MG per tablet, Take 1 tablet by mouth Every 8 (Eight) Hours As Needed for Moderate Pain ., Disp:  90 tablet, Rfl: 0  •  ibuprofen (ADVIL,MOTRIN) 800 MG tablet, TAKE ONE TABLET BY MOUTH EVERY 8 HOURS, Disp: 90 tablet, Rfl: 0  •  loratadine (CLARITIN) 10 MG tablet, Take 10 mg by mouth Daily., Disp: , Rfl:   •  montelukast (SINGULAIR) 10 MG tablet, TAKE ONE TABLET BY MOUTH DAILY, Disp: 90 tablet, Rfl: 2  •  multivitamin with minerals (MULTIVITAMIN ADULT PO), Take 1 tablet by mouth Daily., Disp: , Rfl:   •  PEG 3350 17 GM/SCOOP powder, DISSOLVE 17 GRAMS IN 8 OUNCES OF LIQUID AND DRINK ONCE A DAY, Disp: 507 g, Rfl: 3  •  phentermine 37.5 MG capsule, Take 1 capsule by mouth Every Morning., Disp: 30 capsule, Rfl: 0  •  topiramate (TOPAMAX) 50 MG tablet, Take 1 tablet by mouth Daily., Disp: 90 tablet, Rfl: 0  Allergies   Allergen Reactions   • Dulera [Mometasone Furo-Formoterol Fum] Shortness Of Breath   • Food Anaphylaxis     Eggplant     • Nuts Anaphylaxis     [place]   • Ace Inhibitors Cough   • Angiotensin Receptor Blockers Swelling   • Norvasc [Amlodipine Besylate] Swelling     Social History     Socioeconomic History   • Marital status:      Spouse name: Not on file   • Number of children: Not on file   • Years of education: Not on file   • Highest education level: Not on file   Tobacco Use   • Smoking status: Current Every Day Smoker     Packs/day: 1.00     Years: 30.00     Pack years: 30.00     Types: Cigarettes   • Smokeless tobacco: Never Used   Substance and Sexual Activity   • Alcohol use: Yes     Comment: occ   • Drug use: No   • Sexual activity: Never     Review of Systems   Constitution: Positive for chills and malaise/fatigue. Negative for fever.   HENT: Negative for ear pain and nosebleeds.    Eyes: Negative for blurred vision and double vision.   Cardiovascular: Positive for leg swelling. Negative for chest pain, dyspnea on exertion and palpitations.   Respiratory: Positive for shortness of breath. Negative for cough.    Skin: Negative for rash.   Musculoskeletal: Negative for joint pain.    Gastrointestinal: Negative for abdominal pain, nausea and vomiting.   Neurological: Positive for dizziness and light-headedness. Negative for focal weakness and headaches.   Psychiatric/Behavioral: Negative for depression. The patient is not nervous/anxious.    All other systems reviewed and are negative.             Objective:     Constitutional:       Appearance: Well-developed.   Eyes:      General: No scleral icterus.     Conjunctiva/sclera: Conjunctivae normal.      Pupils: Pupils are equal, round, and reactive to light.   HENT:      Head: Normocephalic and atraumatic.   Neck:      Musculoskeletal: Normal range of motion and neck supple.      Vascular: No carotid bruit or JVD.   Pulmonary:      Effort: Pulmonary effort is normal.      Breath sounds: Normal breath sounds. No wheezing. No rales.   Cardiovascular:      Normal rate. Regular rhythm.   Pulses:     Intact distal pulses.   Abdominal:      General: Bowel sounds are normal.      Palpations: Abdomen is soft.   Musculoskeletal: Normal range of motion.   Skin:     General: Skin is warm and dry.      Findings: No rash.   Neurological:      Mental Status: Alert.      Comments: No focal deficits         ECG 12 Lead    Date/Time: 1/29/2021 2:06 PM  Performed by: Colt Cross MD  Authorized by: Colt Cross MD   Comments: Normal sinus rhythm  Nonspecific ST segment depression  Abnormal EKG  No previous EKGs have been            Lab Review:         MDM  1.  Shortness of breath and swelling of the feet and dizziness  Patient has been having the symptoms of increasing severity and has risk factors for coronary disease including hypertension hyperlipidemia smoking and family history of coronary disease  Patient will have an echocardiogram for LV function valve abnormalities  Patient will have a stress partially rule out ischemia   #2 hypertension excellent patient blood pressure is currently high and is on clonidine but I will change the medicine after the  stress test and echocardiogram were performed  3.  Hyperlipidemia  Patient's lipid levels are followed by the primary care doctor  4.  COPD and obstructive sleep apnea  Patient is advised to stop smoking.

## 2021-02-08 RX ORDER — FLUTICASONE PROPIONATE 50 MCG
SPRAY, SUSPENSION (ML) NASAL
Qty: 16 G | Refills: 5 | Status: SHIPPED | OUTPATIENT
Start: 2021-02-08 | End: 2021-05-20 | Stop reason: SDUPTHER

## 2021-02-08 NOTE — TELEPHONE ENCOUNTER
Last visit: 1/14/21   Next visit: none  Last labs: 11/5/20    Rx requested: Flonase   Pharmacy: [facility] in [place]

## 2021-02-14 DIAGNOSIS — E66.01 CLASS 2 SEVERE OBESITY DUE TO EXCESS CALORIES WITH SERIOUS COMORBIDITY AND BODY MASS INDEX (BMI) OF 36.0 TO 36.9 IN ADULT (HCC): ICD-10-CM

## 2021-02-15 ENCOUNTER — HOSPITAL ENCOUNTER (OUTPATIENT)
Dept: CARDIOLOGY | Facility: HOSPITAL | Age: 56
Discharge: HOME | End: 2021-02-15

## 2021-02-15 VITALS
HEIGHT: 68 IN | SYSTOLIC BLOOD PRESSURE: 158 MMHG | TEMPERATURE: 97.8 F | BODY MASS INDEX: 35.61 KG/M2 | HEART RATE: 88 BPM | OXYGEN SATURATION: 96 % | RESPIRATION RATE: 16 BRPM | WEIGHT: 235 LBS | DIASTOLIC BLOOD PRESSURE: 88 MMHG

## 2021-02-15 VITALS
BODY MASS INDEX: 36.98 KG/M2 | WEIGHT: 244 LBS | HEIGHT: 68 IN | DIASTOLIC BLOOD PRESSURE: 74 MMHG | SYSTOLIC BLOOD PRESSURE: 136 MMHG

## 2021-02-15 DIAGNOSIS — E78.00 PURE HYPERCHOLESTEROLEMIA: ICD-10-CM

## 2021-02-15 DIAGNOSIS — R06.02 SHORTNESS OF BREATH: ICD-10-CM

## 2021-02-15 DIAGNOSIS — I10 ESSENTIAL HYPERTENSION: ICD-10-CM

## 2021-02-15 PROCEDURE — 93016 CV STRESS TEST SUPVJ ONLY: CPT | Performed by: INTERNAL MEDICINE

## 2021-02-15 PROCEDURE — A9500 TC99M SESTAMIBI: HCPCS | Performed by: INTERNAL MEDICINE

## 2021-02-15 PROCEDURE — 0 TECHNETIUM SESTAMIBI: Performed by: INTERNAL MEDICINE

## 2021-02-15 PROCEDURE — 93306 TTE W/DOPPLER COMPLETE: CPT

## 2021-02-15 PROCEDURE — 93306 TTE W/DOPPLER COMPLETE: CPT | Performed by: INTERNAL MEDICINE

## 2021-02-15 PROCEDURE — 78452 HT MUSCLE IMAGE SPECT MULT: CPT | Performed by: INTERNAL MEDICINE

## 2021-02-15 PROCEDURE — 93018 CV STRESS TEST I&R ONLY: CPT | Performed by: INTERNAL MEDICINE

## 2021-02-15 PROCEDURE — 78452 HT MUSCLE IMAGE SPECT MULT: CPT

## 2021-02-15 PROCEDURE — 93017 CV STRESS TEST TRACING ONLY: CPT

## 2021-02-15 RX ORDER — PHENTERMINE HYDROCHLORIDE 37.5 MG/1
CAPSULE ORAL
Qty: 30 CAPSULE | Refills: 0 | Status: SHIPPED | OUTPATIENT
Start: 2021-02-15 | End: 2021-03-15

## 2021-02-15 RX ORDER — POLYETHYLENE GLYCOL 3350 17 G/17G
17 POWDER, FOR SOLUTION ORAL DAILY
Qty: 507 G | Refills: 3 | Status: SHIPPED | OUTPATIENT
Start: 2021-02-15 | End: 2021-06-07

## 2021-02-15 RX ORDER — POLYETHYLENE GLYCOL 3350 17 G/17G
17 POWDER, FOR SOLUTION ORAL DAILY
Qty: 507 G | Refills: 3 | OUTPATIENT
Start: 2021-02-15

## 2021-02-15 RX ORDER — PHENTERMINE HYDROCHLORIDE 37.5 MG/1
37.5 CAPSULE ORAL EVERY MORNING
Qty: 30 CAPSULE | Refills: 0 | OUTPATIENT
Start: 2021-02-15

## 2021-02-15 RX ORDER — IBUPROFEN 800 MG/1
800 TABLET ORAL EVERY 8 HOURS
Qty: 90 TABLET | Refills: 0 | OUTPATIENT
Start: 2021-02-15

## 2021-02-15 RX ORDER — IBUPROFEN 800 MG/1
800 TABLET ORAL EVERY 8 HOURS PRN
Qty: 90 TABLET | Refills: 0 | Status: SHIPPED | OUTPATIENT
Start: 2021-02-15 | End: 2021-07-16 | Stop reason: SDUPTHER

## 2021-02-15 RX ADMIN — TECHNETIUM TC 99M SESTAMIBI 1 DOSE: 1 INJECTION INTRAVENOUS at 13:00

## 2021-02-15 RX ADMIN — TECHNETIUM TC 99M SESTAMIBI 1 DOSE: 1 INJECTION INTRAVENOUS at 12:45

## 2021-02-16 LAB
BH CV ECHO MEAS - AO MAX PG (FULL): 3.8 MMHG
BH CV ECHO MEAS - AO MAX PG: 9.3 MMHG
BH CV ECHO MEAS - AO MEAN PG (FULL): 1.5 MMHG
BH CV ECHO MEAS - AO MEAN PG: 4.6 MMHG
BH CV ECHO MEAS - AO ROOT AREA (BSA CORRECTED): 1.2
BH CV ECHO MEAS - AO ROOT AREA: 5.8 CM^2
BH CV ECHO MEAS - AO ROOT DIAM: 2.7 CM
BH CV ECHO MEAS - AO V2 MAX: 152.5 CM/SEC
BH CV ECHO MEAS - AO V2 MEAN: 100.3 CM/SEC
BH CV ECHO MEAS - AO V2 VTI: 28.7 CM
BH CV ECHO MEAS - ASC AORTA: 2.3 CM
BH CV ECHO MEAS - AVA(I,A): 1.9 CM^2
BH CV ECHO MEAS - AVA(I,D): 1.9 CM^2
BH CV ECHO MEAS - AVA(V,A): 1.9 CM^2
BH CV ECHO MEAS - AVA(V,D): 1.9 CM^2
BH CV ECHO MEAS - BSA(HAYCOCK): 2.4 M^2
BH CV ECHO MEAS - BSA: 2.2 M^2
BH CV ECHO MEAS - BZI_BMI: 37.1 KILOGRAMS/M^2
BH CV ECHO MEAS - BZI_METRIC_HEIGHT: 172.7 CM
BH CV ECHO MEAS - BZI_METRIC_WEIGHT: 110.7 KG
BH CV ECHO MEAS - EDV(CUBED): 53.3 ML
BH CV ECHO MEAS - EDV(MOD-SP4): 45.7 ML
BH CV ECHO MEAS - EDV(TEICH): 60.6 ML
BH CV ECHO MEAS - EF(CUBED): 68.7 %
BH CV ECHO MEAS - EF(MOD-SP4): 71.1 %
BH CV ECHO MEAS - EF(TEICH): 61 %
BH CV ECHO MEAS - ESV(CUBED): 16.7 ML
BH CV ECHO MEAS - ESV(MOD-SP4): 13.2 ML
BH CV ECHO MEAS - ESV(TEICH): 23.6 ML
BH CV ECHO MEAS - FS: 32.1 %
BH CV ECHO MEAS - IVS/LVPW: 1
BH CV ECHO MEAS - IVSD: 1 CM
BH CV ECHO MEAS - LA DIMENSION: 3.7 CM
BH CV ECHO MEAS - LA/AO: 1.3
BH CV ECHO MEAS - LV DIASTOLIC VOL/BSA (35-75): 20.6 ML/M^2
BH CV ECHO MEAS - LV MASS(C)D: 117.8 GRAMS
BH CV ECHO MEAS - LV MASS(C)DI: 52.9 GRAMS/M^2
BH CV ECHO MEAS - LV MAX PG: 5.5 MMHG
BH CV ECHO MEAS - LV MEAN PG: 3.1 MMHG
BH CV ECHO MEAS - LV SYSTOLIC VOL/BSA (12-30): 5.9 ML/M^2
BH CV ECHO MEAS - LV V1 MAX: 117.3 CM/SEC
BH CV ECHO MEAS - LV V1 MEAN: 83.4 CM/SEC
BH CV ECHO MEAS - LV V1 VTI: 22.3 CM
BH CV ECHO MEAS - LVIDD: 3.8 CM
BH CV ECHO MEAS - LVIDS: 2.6 CM
BH CV ECHO MEAS - LVOT AREA: 2.4 CM^2
BH CV ECHO MEAS - LVOT DIAM: 1.8 CM
BH CV ECHO MEAS - LVPWD: 1 CM
BH CV ECHO MEAS - MV A MAX VEL: 124 CM/SEC
BH CV ECHO MEAS - MV DEC SLOPE: 402.2 CM/SEC^2
BH CV ECHO MEAS - MV DEC TIME: 0.23 SEC
BH CV ECHO MEAS - MV E MAX VEL: 92.8 CM/SEC
BH CV ECHO MEAS - MV E/A: 0.75
BH CV ECHO MEAS - MV MAX PG: 6.1 MMHG
BH CV ECHO MEAS - MV MEAN PG: 2.8 MMHG
BH CV ECHO MEAS - MV V2 MAX: 123.5 CM/SEC
BH CV ECHO MEAS - MV V2 MEAN: 80.6 CM/SEC
BH CV ECHO MEAS - MV V2 VTI: 24.7 CM
BH CV ECHO MEAS - MVA(VTI): 2.2 CM^2
BH CV ECHO MEAS - PA ACC TIME: 0.12 SEC
BH CV ECHO MEAS - PA PR(ACCEL): 26.1 MMHG
BH CV ECHO MEAS - RV MAX PG: 2.8 MMHG
BH CV ECHO MEAS - RV MEAN PG: 1.5 MMHG
BH CV ECHO MEAS - RV V1 MAX: 82.9 CM/SEC
BH CV ECHO MEAS - RV V1 MEAN: 58.1 CM/SEC
BH CV ECHO MEAS - RV V1 VTI: 18.3 CM
BH CV ECHO MEAS - RVDD: 2.6 CM
BH CV ECHO MEAS - SI(AO): 74.3 ML/M^2
BH CV ECHO MEAS - SI(CUBED): 16.5 ML/M^2
BH CV ECHO MEAS - SI(LVOT): 24.4 ML/M^2
BH CV ECHO MEAS - SI(MOD-SP4): 14.6 ML/M^2
BH CV ECHO MEAS - SI(TEICH): 16.6 ML/M^2
BH CV ECHO MEAS - SV(AO): 165.2 ML
BH CV ECHO MEAS - SV(CUBED): 36.6 ML
BH CV ECHO MEAS - SV(LVOT): 54.4 ML
BH CV ECHO MEAS - SV(MOD-SP4): 32.5 ML
BH CV ECHO MEAS - SV(TEICH): 37 ML
BH CV STRESS BP STAGE 1: NORMAL
BH CV STRESS BP STAGE 2: NORMAL
BH CV STRESS DURATION MIN STAGE 1: 3
BH CV STRESS DURATION MIN STAGE 2: 2
BH CV STRESS DURATION MIN STAGE 3: 0
BH CV STRESS DURATION SEC STAGE 1: 0
BH CV STRESS DURATION SEC STAGE 2: 0
BH CV STRESS DURATION SEC STAGE 3: 0
BH CV STRESS GRADE STAGE 1: 10
BH CV STRESS GRADE STAGE 2: 12
BH CV STRESS GRADE STAGE 3: 14
BH CV STRESS HR STAGE 1: 119
BH CV STRESS HR STAGE 2: 142
BH CV STRESS METS STAGE 1: 5
BH CV STRESS METS STAGE 2: 7.5
BH CV STRESS METS STAGE 3: 10
BH CV STRESS PROTOCOL 1: NORMAL
BH CV STRESS RECOVERY BP: NORMAL MMHG
BH CV STRESS RECOVERY HR: 101 BPM
BH CV STRESS SPEED STAGE 1: 1.7
BH CV STRESS SPEED STAGE 2: 2.5
BH CV STRESS SPEED STAGE 3: 3.4
BH CV STRESS STAGE 1: 1
BH CV STRESS STAGE 2: 2
BH CV STRESS STAGE 3: 3
LV EF NUC BP: 63 %
MAXIMAL PREDICTED HEART RATE: 164 BPM
MAXIMAL PREDICTED HEART RATE: 164 BPM
PERCENT MAX PREDICTED HR: 86.59 %
STRESS BASELINE BP: NORMAL MMHG
STRESS BASELINE HR: 76 BPM
STRESS PERCENT HR: 102 %
STRESS POST ESTIMATED WORKLOAD: 6.8 METS
STRESS POST EXERCISE DUR MIN: 5 MIN
STRESS POST PEAK BP: NORMAL MMHG
STRESS POST PEAK HR: 142 BPM
STRESS TARGET HR: 139 BPM
STRESS TARGET HR: 139 BPM

## 2021-02-17 ENCOUNTER — TELEPHONE (OUTPATIENT)
Dept: FAMILY MEDICINE CLINIC | Facility: CLINIC | Age: 56
End: 2021-02-17

## 2021-02-17 NOTE — TELEPHONE ENCOUNTER
PA for Ibuprofen was denied as it is a plan exclusion.     The Prior Authorization request for the above medication has been cancelled:  The requested medication is a benefit exclusion and is not covered by the plan. Please have the patient  contact member services for further assistance.    138

## 2021-02-17 NOTE — TELEPHONE ENCOUNTER
My chart message was sent to the patient.     The Prior authorization that was sent to your insurance on the Ibuprofen 800mg was denied. The insurance states that this medication is a plan exclusion.     If she calls. Hub to read.

## 2021-03-14 DIAGNOSIS — E66.01 CLASS 2 SEVERE OBESITY DUE TO EXCESS CALORIES WITH SERIOUS COMORBIDITY AND BODY MASS INDEX (BMI) OF 36.0 TO 36.9 IN ADULT (HCC): ICD-10-CM

## 2021-03-15 RX ORDER — IBUPROFEN 800 MG/1
TABLET ORAL
Qty: 90 TABLET | Refills: 0 | OUTPATIENT
Start: 2021-03-15

## 2021-03-15 RX ORDER — PHENTERMINE HYDROCHLORIDE 37.5 MG/1
CAPSULE ORAL
Qty: 30 CAPSULE | Refills: 0 | Status: SHIPPED | OUTPATIENT
Start: 2021-03-15 | End: 2021-04-16

## 2021-03-15 NOTE — TELEPHONE ENCOUNTER
Phoned pt to schedule appt in April.  She requested labs too.  Scheduled those prior to f/u.  Please add order.  Also, she says Dr Cross was supposed to call you regarding a med change for her bp.  Did not see any notes from him.  Please advise.

## 2021-03-15 NOTE — TELEPHONE ENCOUNTER
Last visit:  1/14/21  Next visit: none  Last labs: 10/27/20    Rx requested:Phentermine   Ph[facility]acy: [facility] [place]

## 2021-03-15 NOTE — TELEPHONE ENCOUNTER
Cardio said he would change her BP meds after her stress test---Did He??? Ask pt  Also, not sure what labs she wants done ..... not due for any

## 2021-03-15 NOTE — TELEPHONE ENCOUNTER
Last visit:  1/14/21  Next visit: none  Last labs: 10/27/20     Rx requested:Ibuprofen  Pharmacy: [facility] in [place]

## 2021-03-16 NOTE — TELEPHONE ENCOUNTER
Pt states [facility] did not change her BP meds after the stress test. Still only taking the clonidine bid.   She has lab sched 4/7 and will do BMP then.

## 2021-04-07 ENCOUNTER — CLINICAL SUPPORT (OUTPATIENT)
Dept: FAMILY MEDICINE CLINIC | Facility: CLINIC | Age: 56
End: 2021-04-07

## 2021-04-07 DIAGNOSIS — I10 ESSENTIAL HYPERTENSION: ICD-10-CM

## 2021-04-07 DIAGNOSIS — N28.9 RENAL INSUFFICIENCY: ICD-10-CM

## 2021-04-07 DIAGNOSIS — Z13.220 SCREENING FOR HYPERLIPIDEMIA: ICD-10-CM

## 2021-04-07 PROCEDURE — 80048 BASIC METABOLIC PNL TOTAL CA: CPT | Performed by: FAMILY MEDICINE

## 2021-04-07 PROCEDURE — 36415 COLL VENOUS BLD VENIPUNCTURE: CPT | Performed by: FAMILY MEDICINE

## 2021-04-08 ENCOUNTER — OFFICE VISIT (OUTPATIENT)
Dept: FAMILY MEDICINE CLINIC | Facility: CLINIC | Age: 56
End: 2021-04-08

## 2021-04-08 VITALS
SYSTOLIC BLOOD PRESSURE: 146 MMHG | WEIGHT: 241 LBS | OXYGEN SATURATION: 100 % | BODY MASS INDEX: 36.53 KG/M2 | HEART RATE: 89 BPM | TEMPERATURE: 97.3 F | RESPIRATION RATE: 18 BRPM | DIASTOLIC BLOOD PRESSURE: 85 MMHG | HEIGHT: 68 IN

## 2021-04-08 DIAGNOSIS — J43.1 PANLOBULAR EMPHYSEMA (HCC): Chronic | ICD-10-CM

## 2021-04-08 DIAGNOSIS — I10 ESSENTIAL HYPERTENSION: Primary | ICD-10-CM

## 2021-04-08 LAB
ANION GAP SERPL CALCULATED.3IONS-SCNC: 11.6 MMOL/L (ref 5–15)
BUN SERPL-MCNC: 26 MG/DL (ref 6–20)
BUN/CREAT SERPL: 24.5 (ref 7–25)
CALCIUM SPEC-SCNC: 9 MG/DL (ref 8.6–10.5)
CHLORIDE SERPL-SCNC: 110 MMOL/L (ref 98–107)
CO2 SERPL-SCNC: 23.4 MMOL/L (ref 22–29)
CREAT SERPL-MCNC: 1.06 MG/DL (ref 0.57–1)
GFR SERPL CREATININE-BSD FRML MDRD: 54 ML/MIN/1.73
GLUCOSE SERPL-MCNC: 89 MG/DL (ref 65–99)
POTASSIUM SERPL-SCNC: 4.6 MMOL/L (ref 3.5–5.2)
SODIUM SERPL-SCNC: 145 MMOL/L (ref 136–145)

## 2021-04-08 PROCEDURE — 99214 OFFICE O/P EST MOD 30 MIN: CPT | Performed by: FAMILY MEDICINE

## 2021-04-08 RX ORDER — DILTIAZEM HYDROCHLORIDE 120 MG/1
120 CAPSULE, COATED, EXTENDED RELEASE ORAL NIGHTLY
Qty: 30 CAPSULE | Refills: 1 | Status: SHIPPED | OUTPATIENT
Start: 2021-04-08 | End: 2021-06-03

## 2021-04-08 RX ORDER — ALBUTEROL SULFATE 90 MCG
2 HFA AEROSOL WITH ADAPTER (GRAM) INHALATION EVERY 4 HOURS PRN
Qty: 18 G | Refills: 0 | Status: SHIPPED | OUTPATIENT
Start: 2021-04-08 | End: 2021-04-19

## 2021-04-09 ENCOUNTER — TELEPHONE (OUTPATIENT)
Dept: FAMILY MEDICINE CLINIC | Facility: CLINIC | Age: 56
End: 2021-04-09

## 2021-04-09 RX ORDER — FLUTICASONE PROPIONATE AND SALMETEROL XINAFOATE 115; 21 UG/1; UG/1
2 AEROSOL, METERED RESPIRATORY (INHALATION)
Qty: 12 G | Refills: 0 | Status: SHIPPED | OUTPATIENT
Start: 2021-04-09 | End: 2021-07-16

## 2021-04-09 NOTE — TELEPHONE ENCOUNTER
PA for Anoro was cancelled stating:    The requested medication is a benefit exclusion and is not covered by the plan.

## 2021-04-09 NOTE — TELEPHONE ENCOUNTER
PA for Proventil came back as cancelled because:    The requested medication is a benefit exclusion and is not covered by the plan.

## 2021-04-12 NOTE — TELEPHONE ENCOUNTER
My chart message was sent to the patient.     Both prior authorizations were denied.   Per :   We can try xopenex for her rescue HFA and see if works better.  Sent out Advair to try---will need to rinse mouth after using.    Advair will replace Anoro   Xopenex will replace Proventil    Information about the changes was sent to [facility].

## 2021-04-15 DIAGNOSIS — E66.01 CLASS 2 SEVERE OBESITY DUE TO EXCESS CALORIES WITH SERIOUS COMORBIDITY AND BODY MASS INDEX (BMI) OF 36.0 TO 36.9 IN ADULT (HCC): ICD-10-CM

## 2021-04-16 RX ORDER — PHENTERMINE HYDROCHLORIDE 37.5 MG/1
CAPSULE ORAL
Qty: 30 CAPSULE | Refills: 0 | Status: SHIPPED | OUTPATIENT
Start: 2021-04-16 | End: 2021-05-20 | Stop reason: SDUPTHER

## 2021-04-19 RX ORDER — LEVALBUTEROL TARTRATE 45 UG/1
1-2 AEROSOL, METERED ORAL EVERY 6 HOURS PRN
Qty: 15 G | Refills: 0 | Status: SHIPPED | OUTPATIENT
Start: 2021-04-19 | End: 2021-07-16

## 2021-04-20 ENCOUNTER — OFFICE VISIT (OUTPATIENT)
Dept: PAIN MEDICINE | Facility: CLINIC | Age: 56
End: 2021-04-20

## 2021-04-20 VITALS
OXYGEN SATURATION: 100 % | HEART RATE: 83 BPM | WEIGHT: 241 LBS | DIASTOLIC BLOOD PRESSURE: 77 MMHG | HEIGHT: 68 IN | RESPIRATION RATE: 16 BRPM | TEMPERATURE: 97.3 F | SYSTOLIC BLOOD PRESSURE: 161 MMHG | BODY MASS INDEX: 36.53 KG/M2

## 2021-04-20 DIAGNOSIS — M54.40 CHRONIC BILATERAL LOW BACK PAIN WITH SCIATICA, SCIATICA LATERALITY UNSPECIFIED: Primary | ICD-10-CM

## 2021-04-20 DIAGNOSIS — G43.C0 PERIODIC HEADACHE SYNDROME, NOT INTRACTABLE: ICD-10-CM

## 2021-04-20 DIAGNOSIS — M54.2 CERVICALGIA: ICD-10-CM

## 2021-04-20 DIAGNOSIS — M48.02 CERVICAL SPINAL STENOSIS: ICD-10-CM

## 2021-04-20 DIAGNOSIS — M79.10 MYALGIA: ICD-10-CM

## 2021-04-20 DIAGNOSIS — G89.4 CHRONIC PAIN DISORDER: ICD-10-CM

## 2021-04-20 DIAGNOSIS — M54.6 CHRONIC BILATERAL THORACIC BACK PAIN: ICD-10-CM

## 2021-04-20 DIAGNOSIS — M54.12 CERVICAL RADICULOPATHY: ICD-10-CM

## 2021-04-20 DIAGNOSIS — G89.29 CHRONIC BILATERAL THORACIC BACK PAIN: ICD-10-CM

## 2021-04-20 DIAGNOSIS — G89.29 CHRONIC BILATERAL LOW BACK PAIN WITH SCIATICA, SCIATICA LATERALITY UNSPECIFIED: Primary | ICD-10-CM

## 2021-04-20 DIAGNOSIS — M79.7 FIBROMYALGIA: ICD-10-CM

## 2021-04-20 PROCEDURE — 99213 OFFICE O/P EST LOW 20 MIN: CPT | Performed by: PHYSICAL MEDICINE & REHABILITATION

## 2021-04-20 RX ORDER — HYDROCODONE BITARTRATE AND ACETAMINOPHEN 10; 325 MG/1; MG/1
1 TABLET ORAL 2 TIMES DAILY PRN
Qty: 90 TABLET | Refills: 0 | Status: SHIPPED | OUTPATIENT
Start: 2021-04-20 | End: 2021-07-16 | Stop reason: SDUPTHER

## 2021-04-20 RX ORDER — HYDROCODONE BITARTRATE AND ACETAMINOPHEN 10; 325 MG/1; MG/1
1 TABLET ORAL 2 TIMES DAILY PRN
Qty: 60 TABLET | Refills: 0 | Status: SHIPPED | OUTPATIENT
Start: 2021-04-20 | End: 2021-07-16 | Stop reason: SDUPTHER

## 2021-04-20 RX ORDER — HYDROCODONE BITARTRATE AND ACETAMINOPHEN 10; 325 MG/1; MG/1
1 TABLET ORAL 2 TIMES DAILY PRN
Qty: 60 TABLET | Refills: 0 | Status: SHIPPED | OUTPATIENT
Start: 2021-04-20 | End: 2021-07-20 | Stop reason: SDUPTHER

## 2021-04-20 NOTE — PROGRESS NOTES
Subjective   Nicki Fraser is a 55 y.o. female.     Neck and back pain, 10/10 at worst, 7/10 at best, always present, varies, aching, stabbing, worse with bending and lifting, interferes with ADLs, sleep, activity, failed PT, chiropractor, meds. MRI C-spine with DDD, stenosis. Saw Dr. Overton, had PT, given Norco 10mg TID prn, began receiving opioids from PCP, down to BID with less relief, PCP retiring, increased here to TID prn. Failed injections, surgery in past. Back pain worse after housework, no b/b incontinence, no new weakness or numbness. Worsening b/l thumb and great toe pain.       The following portions of the patient's history were reviewed and updated as appropriate: allergies, current medications, past family history, past medical history, past social history, past surgical history and problem list.    Review of Systems   Constitutional: Positive for fatigue. Negative for chills and fever.   HENT: Negative for hearing loss and trouble swallowing.    Eyes: Negative for visual disturbance.   Respiratory: Negative for shortness of breath.    Cardiovascular: Negative for chest pain.   Gastrointestinal: Positive for constipation and nausea. Negative for abdominal pain, diarrhea and vomiting.   Genitourinary: Negative for urinary incontinence.   Musculoskeletal: Positive for arthralgias, back pain, joint swelling and neck pain. Negative for myalgias.   Neurological: Positive for dizziness and headache. Negative for weakness and numbness.       Objective   Physical Exam   Constitutional: She is oriented to person, place, and time. She appears well-developed and well-nourished.   HENT:   Head: Normocephalic and atraumatic.   Eyes: Pupils are equal, round, and reactive to light.   Cardiovascular: Normal rate, regular rhythm and normal heart sounds.   Pulmonary/Chest: Breath sounds normal.   Abdominal: Soft. Bowel sounds are normal. She exhibits no distension. There is no abdominal tenderness.    Neurological: She is alert and oriented to person, place, and time. She has normal reflexes. She displays normal reflexes. No sensory deficit.   Psychiatric: Her behavior is normal. Thought content normal.         Assessment/Plan   Diagnoses and all orders for this visit:    1. Chronic bilateral low back pain with sciatica, sciatica laterality unspecified (Primary)    2. Chronic bilateral thoracic back pain    3. Chronic pain disorder    4. Fibromyalgia    5. Periodic headache syndrome, not intractable    6. Myalgia    7. Cervical radiculopathy    8. Cervical spinal stenosis    9. Cervicalgia      Inspect reviewed, in order. UDS in order 7/31/20.  Treatment plan will consist of continuing current medication as long as it remains effective and is necessary, while evaluating patient at each visit and determining if the medication can be lowered or discontinued, while also using nonopioid therapies to reduce reliance on opioids.  Increased to Norco 10mg QID prn, then reduced to TID prn, reduce to BID prn, doing well, denies side effects.  Began Tizanidine 4mg TID prn.  Began Lyrica 50mg BID, cannot tolerate Gabapentin.  Failed injections, surgery.  Insurance will not cover comp cream. Cont Voltaren.  Pt will ask PCP for referral to allergist for possible bee sting allergy.  RTC 3 months for f/u.

## 2021-04-27 ENCOUNTER — TELEPHONE (OUTPATIENT)
Dept: FAMILY MEDICINE CLINIC | Facility: CLINIC | Age: 56
End: 2021-04-27

## 2021-04-28 NOTE — TELEPHONE ENCOUNTER
Hub to read.  PA for generic Xopenex was approved.  PA approval lasts until 12/31/21.  PA approval was faxed to [facility] in [place].

## 2021-05-02 PROBLEM — I10 HYPERTENSION: Chronic | Status: ACTIVE | Noted: 2019-08-28

## 2021-05-20 DIAGNOSIS — E66.01 CLASS 2 SEVERE OBESITY DUE TO EXCESS CALORIES WITH SERIOUS COMORBIDITY AND BODY MASS INDEX (BMI) OF 36.0 TO 36.9 IN ADULT (HCC): ICD-10-CM

## 2021-05-21 RX ORDER — PHENTERMINE HYDROCHLORIDE 37.5 MG/1
37.5 CAPSULE ORAL EVERY MORNING
Qty: 30 CAPSULE | Refills: 0 | Status: SHIPPED | OUTPATIENT
Start: 2021-05-21 | End: 2021-06-20 | Stop reason: SDUPTHER

## 2021-05-21 RX ORDER — FLUTICASONE PROPIONATE 50 MCG
2 SPRAY, SUSPENSION (ML) NASAL DAILY
Qty: 16 G | Refills: 5 | Status: SHIPPED | OUTPATIENT
Start: 2021-05-21 | End: 2021-10-28 | Stop reason: SDUPTHER

## 2021-05-26 ENCOUNTER — CLINICAL SUPPORT (OUTPATIENT)
Dept: FAMILY MEDICINE CLINIC | Facility: CLINIC | Age: 56
End: 2021-05-26

## 2021-05-26 VITALS — SYSTOLIC BLOOD PRESSURE: 137 MMHG | HEART RATE: 85 BPM | DIASTOLIC BLOOD PRESSURE: 83 MMHG | OXYGEN SATURATION: 100 %

## 2021-06-03 RX ORDER — DILTIAZEM HYDROCHLORIDE 120 MG/1
CAPSULE, EXTENDED RELEASE ORAL
Qty: 90 CAPSULE | Refills: 0 | Status: SHIPPED | OUTPATIENT
Start: 2021-06-03 | End: 2021-07-16 | Stop reason: SDUPTHER

## 2021-06-05 RX ORDER — POLYETHYLENE GLYCOL 3350 17 G/17G
17 POWDER, FOR SOLUTION ORAL DAILY
Qty: 507 G | Refills: 3 | Status: CANCELLED | OUTPATIENT
Start: 2021-06-05

## 2021-06-07 RX ORDER — POLYETHYLENE GLYCOL 3350 17 G/17G
17 POWDER, FOR SOLUTION ORAL DAILY
Qty: 510 G | Refills: 3 | Status: SHIPPED | OUTPATIENT
Start: 2021-06-07 | End: 2021-09-13 | Stop reason: SDUPTHER

## 2021-06-07 NOTE — TELEPHONE ENCOUNTER
Last visit:  4/8/21  Next visit: none  Last labs: 4/7/21    Rx requested: polyethylene glycol  Pharmacy: [facility] in [place]

## 2021-06-20 DIAGNOSIS — E66.01 CLASS 2 SEVERE OBESITY DUE TO EXCESS CALORIES WITH SERIOUS COMORBIDITY AND BODY MASS INDEX (BMI) OF 36.0 TO 36.9 IN ADULT (HCC): ICD-10-CM

## 2021-06-21 RX ORDER — PHENTERMINE HYDROCHLORIDE 37.5 MG/1
37.5 CAPSULE ORAL EVERY MORNING
Qty: 30 CAPSULE | Refills: 0 | Status: SHIPPED | OUTPATIENT
Start: 2021-06-21 | End: 2021-07-16 | Stop reason: SDUPTHER

## 2021-06-21 RX ORDER — PHENTERMINE HYDROCHLORIDE 37.5 MG/1
CAPSULE ORAL
Qty: 30 CAPSULE | Refills: 0 | OUTPATIENT
Start: 2021-06-21

## 2021-06-21 RX ORDER — TOPIRAMATE 50 MG/1
TABLET, FILM COATED ORAL
Qty: 90 TABLET | Refills: 0 | OUTPATIENT
Start: 2021-06-21

## 2021-06-21 RX ORDER — TOPIRAMATE 50 MG/1
50 TABLET, FILM COATED ORAL DAILY
Qty: 90 TABLET | Refills: 0 | OUTPATIENT
Start: 2021-06-21

## 2021-06-21 NOTE — TELEPHONE ENCOUNTER
Last visit:  4/8/21  Next visit: none  Last labs: 4/7/21  Last lipid:10/27/20    Rx requested:Phentermine   Pharmacy: [facility] in [place]

## 2021-07-16 ENCOUNTER — OFFICE VISIT (OUTPATIENT)
Dept: FAMILY MEDICINE CLINIC | Facility: CLINIC | Age: 56
End: 2021-07-16

## 2021-07-16 VITALS
DIASTOLIC BLOOD PRESSURE: 83 MMHG | SYSTOLIC BLOOD PRESSURE: 154 MMHG | RESPIRATION RATE: 16 BRPM | HEIGHT: 68 IN | BODY MASS INDEX: 35.77 KG/M2 | TEMPERATURE: 98.2 F | WEIGHT: 236 LBS | OXYGEN SATURATION: 100 % | HEART RATE: 81 BPM

## 2021-07-16 DIAGNOSIS — N28.9 RENAL INSUFFICIENCY: ICD-10-CM

## 2021-07-16 DIAGNOSIS — R09.89 OTHER SPECIFIED SYMPTOMS AND SIGNS INVOLVING THE CIRCULATORY AND RESPIRATORY SYSTEMS: ICD-10-CM

## 2021-07-16 DIAGNOSIS — E66.01 CLASS 2 SEVERE OBESITY DUE TO EXCESS CALORIES WITH SERIOUS COMORBIDITY AND BODY MASS INDEX (BMI) OF 36.0 TO 36.9 IN ADULT (HCC): ICD-10-CM

## 2021-07-16 DIAGNOSIS — M79.604 LEG PAIN, BILATERAL: ICD-10-CM

## 2021-07-16 DIAGNOSIS — I10 ESSENTIAL HYPERTENSION: Primary | Chronic | ICD-10-CM

## 2021-07-16 DIAGNOSIS — M79.605 LEG PAIN, BILATERAL: ICD-10-CM

## 2021-07-16 PROCEDURE — 99213 OFFICE O/P EST LOW 20 MIN: CPT | Performed by: FAMILY MEDICINE

## 2021-07-16 RX ORDER — TOPIRAMATE 50 MG/1
50 TABLET, FILM COATED ORAL DAILY
Qty: 90 TABLET | Refills: 1 | Status: SHIPPED | OUTPATIENT
Start: 2021-07-16 | End: 2022-01-20 | Stop reason: SDUPTHER

## 2021-07-16 RX ORDER — DILTIAZEM HYDROCHLORIDE 120 MG/1
120 CAPSULE, EXTENDED RELEASE ORAL NIGHTLY
Qty: 90 CAPSULE | Refills: 0 | Status: SHIPPED | OUTPATIENT
Start: 2021-07-16 | End: 2021-10-12 | Stop reason: SDUPTHER

## 2021-07-16 RX ORDER — TRIAMTERENE AND HYDROCHLOROTHIAZIDE 37.5; 25 MG/1; MG/1
1 CAPSULE ORAL EVERY MORNING
Qty: 30 CAPSULE | Refills: 1 | Status: SHIPPED | OUTPATIENT
Start: 2021-07-16 | End: 2021-07-27

## 2021-07-16 RX ORDER — PHENTERMINE HYDROCHLORIDE 37.5 MG/1
37.5 CAPSULE ORAL EVERY MORNING
Qty: 30 CAPSULE | Refills: 0 | Status: SHIPPED | OUTPATIENT
Start: 2021-07-16 | End: 2021-08-18 | Stop reason: SDUPTHER

## 2021-07-16 RX ORDER — IBUPROFEN 800 MG/1
800 TABLET ORAL EVERY 8 HOURS PRN
Qty: 90 TABLET | Refills: 0 | Status: SHIPPED | OUTPATIENT
Start: 2021-07-16 | End: 2021-10-12 | Stop reason: SDUPTHER

## 2021-07-16 NOTE — PROGRESS NOTES
Answers for HPI/ROS submitted by the patient on 7/16/2021  Please describe your symptoms.: Swelling,  aching at ever joint ever sense starting diltiazem. I had these symptoms to some extent before but they have worsen with this med. My feet hurt and caluses have all the sudden just peeled off in big chunks.  Have you had these symptoms before?: No  How long have you been having these symptoms?: Greater than 2 weeks  What is the primary reason for your visit?: Other    Subjective   Nicki Fraser is a 55 y.o. female.     Chief Complaint   Patient presents with   • Edema     Since she started the diltiazem she has been swelling and her feet has been hurting. Also if she gets a scratch she bleeds more.    • Weight Loss     Ibuprofen,Phentermine   • Hypertension         Current Outpatient Medications:   •  albuterol (PROVENTIL) (2.5 MG/3ML) 0.083% nebulizer solution, Take 2.5 mg by nebulization Every 4 (Four) Hours As Needed for Wheezing., Disp: , Rfl:   •  Cholecalciferol (EQL VITAMIN D3) 2000 units capsule, Take 1 capsule by mouth Daily., Disp: , Rfl:   •  cyclobenzaprine (FLEXERIL) 10 MG tablet, TAKE ONE TABLET BY MOUTH EVERY NIGHT AT BEDTIME AS NEEDED FOR MUSCLE SPASMS, Disp: 30 tablet, Rfl: 0  •  dextromethorphan-guaifenesin (MUCINEX DM MAX STRENGTH)  MG per 12 hr tablet, Take 1 tablet by mouth 2 (Two) Times a Day As Needed., Disp: , Rfl:   •  diclofenac (VOLTAREN) 1 % gel gel, Apply 4 g topically to the appropriate area as directed 4 (Four) Times a Day As Needed (joint pain)., Disp: 5 tube, Rfl: 11  •  dilTIAZem XR (DILACOR XR) 120 MG 24 hr capsule, Take 1 capsule by mouth Every Night., Disp: 90 capsule, Rfl: 0  •  EPINEPHrine (EPIPEN) 0.3 MG/0.3ML solution auto-injector injection, Inject 0.3 mL under the skin into the appropriate area as directed As Needed (allergic reaction)., Disp: 1 each, Rfl: 0  •  fluticasone (FLONASE) 50 MCG/ACT nasal spray, 2 sprays by Each Nare route Daily., Disp: 16 g,  Rfl: 5  •  HYDROcodone-acetaminophen (Norco)  MG per tablet, Take 1 tablet by mouth 2 (Two) Times a Day As Needed for Moderate Pain ., Disp: 60 tablet, Rfl: 0  •  ibuprofen (ADVIL,MOTRIN) 800 MG tablet, Take 1 tablet by mouth Every 8 (Eight) Hours As Needed for Moderate Pain ., Disp: 90 tablet, Rfl: 0  •  loratadine (CLARITIN) 10 MG tablet, Take 10 mg by mouth Daily., Disp: , Rfl:   •  montelukast (SINGULAIR) 10 MG tablet, TAKE ONE TABLET BY MOUTH DAILY, Disp: 90 tablet, Rfl: 2  •  phentermine 37.5 MG capsule, Take 1 capsule by mouth Every Morning., Disp: 30 capsule, Rfl: 0  •  polyethylene glycol (PEG 3350) 17 GM/SCOOP powder, Take 17 g by mouth Daily. dissolve 17 grams in 8 ounces of liquid and drink once a day, Disp: 510 g, Rfl: 3  •  topiramate (TOPAMAX) 50 MG tablet, Take 1 tablet by mouth Daily., Disp: 90 tablet, Rfl: 1  •  triamterene-hydrochlorothiazide (Dyazide) 37.5-25 MG per capsule, Take 1 capsule by mouth Every Morning., Disp: 30 capsule, Rfl: 1    Past Medical History:   Diagnosis Date   • Allergic    • Cervical radiculopathy    • COPD    • DDD, cervical    • Fibromyalgia    • Hypertension    • Left shoulder pain    • MAMMO     NEG = 2020   • Migraine headache    • Myalgia    • Obesity    • PAP     ASCUS =        Past Surgical History:   Procedure Laterality Date   •  SECTION     • COLONOSCOPY      COLOGUARD ---NEG = , rech    • LYMPH NODE DISSECTION      cervical node= NEG   • TUBAL ABDOMINAL LIGATION         Family History   Problem Relation Age of Onset   • Heart disease Mother    • Osteoporosis Mother    • Hypertension Father    • Kidney disease Father         KIDNEY STONES   • Stroke Father    • Heart disease Father    • Arthritis Brother    • Diabetes Brother    • Heart disease Brother    • Hypertension Brother        Social History     Socioeconomic History   • Marital status:      Spouse name: Not on file   • Number of children: Not on file   • Years of  education: Not on file   • Highest education level: Not on file   Tobacco Use   • Smoking status: Current Every Day Smoker     Packs/day: 1.00     Years: 30.00     Pack years: 30.00     Types: Cigarettes   • Smokeless tobacco: Never Used   Vaping Use   • Vaping Use: Never used   Substance and Sexual Activity   • Alcohol use: Yes     Comment: occ   • Drug use: No   • Sexual activity: Never       54 y/o C female here for 3mos f/u on chronic wt loss meds/ HTN    Pt states she has been having off/on LE swelling since starting on current BP med; Pt states she doesn't wear compression hose and does have varicose veins.........Pt states her Legs hurt when she walks for any extended period of time and not had them checked for arterial Dx    She states she has been trying to watch her portion sizes and increase her activity as much as poss for wt loss as well       The following portions of the patient's history were reviewed and updated as appropriate: allergies, current medications, past family history, past medical history, past social history, past surgical history and problem list.    Review of Systems   Constitutional: Positive for activity change, appetite change and unexpected weight loss. Negative for unexpected weight gain.   Cardiovascular: Positive for leg swelling.   Musculoskeletal: Positive for arthralgias, back pain, gait problem and myalgias.   Skin: Negative for rash and bruise.       Vitals:    07/16/21 1508   BP: 154/83   Pulse: 81   Resp: 16   Temp: 98.2 °F (36.8 °C)   SpO2: 100%       Objective   Physical Exam  Vitals and nursing note reviewed.   Constitutional:       General: She is not in acute distress.     Appearance: She is well-developed. She is obese. She is not ill-appearing or toxic-appearing.   HENT:      Head: Normocephalic and atraumatic.   Pulmonary:      Effort: Pulmonary effort is normal.   Musculoskeletal:      Right lower leg: No edema.      Left lower leg: No edema.      Comments: B/L  LE varicosities   Skin:     General: Skin is warm and dry.      Findings: No rash.   Neurological:      Mental Status: She is alert and oriented to person, place, and time.      Cranial Nerves: No cranial nerve deficit.   Psychiatric:         Attention and Perception: Attention and perception normal.         Mood and Affect: Mood and affect normal.         Speech: Speech normal.         Behavior: Behavior normal. Behavior is cooperative.         Thought Content: Thought content normal.         Cognition and Memory: Cognition and memory normal.         Judgment: Judgment normal.           Assessment/Plan   Diagnoses and all orders for this visit:    1. Essential hypertension (Primary)    2. Leg pain, bilateral  -     Doppler Arterial Multi Level Lower Extremity - Bilateral CAR; Future    3. Class 2 severe obesity due to excess calories with serious comorbidity and body mass index (BMI) of 36.0 to 36.9 in adult (CMS/[facility])  -     phentermine 37.5 MG capsule; Take 1 capsule by mouth Every Morning.  Dispense: 30 capsule; Refill: 0    4. Renal insufficiency  -     Basic Metabolic Panel    5. Other specified symptoms and signs involving the circulatory and respiratory systems   -     Doppler Arterial Multi Level Lower Extremity - Bilateral CAR; Future    Other orders  -     triamterene-hydrochlorothiazide (Dyazide) 37.5-25 MG per capsule; Take 1 capsule by mouth Every Morning.  Dispense: 30 capsule; Refill: 1  -     ibuprofen (ADVIL,MOTRIN) 800 MG tablet; Take 1 tablet by mouth Every 8 (Eight) Hours As Needed for Moderate Pain .  Dispense: 90 tablet; Refill: 0  -     dilTIAZem XR (DILACOR XR) 120 MG 24 hr capsule; Take 1 capsule by mouth Every Night.  Dispense: 90 capsule; Refill: 0  -     topiramate (TOPAMAX) 50 MG tablet; Take 1 tablet by mouth Daily.  Dispense: 90 tablet; Refill: 1    Check JESSICA's b/l LE  Add low dose diuretic tx for LE swelling/ HTN  Med refills  Cont to work on diet and exercise for wt loss  rech BMP  and closely monitor

## 2021-07-20 ENCOUNTER — OFFICE VISIT (OUTPATIENT)
Dept: PAIN MEDICINE | Facility: CLINIC | Age: 56
End: 2021-07-20

## 2021-07-20 VITALS
SYSTOLIC BLOOD PRESSURE: 143 MMHG | RESPIRATION RATE: 16 BRPM | BODY MASS INDEX: 35.77 KG/M2 | WEIGHT: 236 LBS | HEART RATE: 84 BPM | HEIGHT: 68 IN | OXYGEN SATURATION: 96 % | DIASTOLIC BLOOD PRESSURE: 81 MMHG

## 2021-07-20 DIAGNOSIS — G89.29 CHRONIC BILATERAL LOW BACK PAIN WITH SCIATICA, SCIATICA LATERALITY UNSPECIFIED: Primary | ICD-10-CM

## 2021-07-20 DIAGNOSIS — M54.6 CHRONIC BILATERAL THORACIC BACK PAIN: ICD-10-CM

## 2021-07-20 DIAGNOSIS — M48.02 CERVICAL SPINAL STENOSIS: ICD-10-CM

## 2021-07-20 DIAGNOSIS — M54.40 CHRONIC BILATERAL LOW BACK PAIN WITH SCIATICA, SCIATICA LATERALITY UNSPECIFIED: Primary | ICD-10-CM

## 2021-07-20 DIAGNOSIS — M79.7 FIBROMYALGIA: ICD-10-CM

## 2021-07-20 DIAGNOSIS — G89.29 CHRONIC BILATERAL THORACIC BACK PAIN: ICD-10-CM

## 2021-07-20 DIAGNOSIS — M54.2 CERVICALGIA: ICD-10-CM

## 2021-07-20 DIAGNOSIS — M54.12 CERVICAL RADICULOPATHY: ICD-10-CM

## 2021-07-20 DIAGNOSIS — M79.10 MYALGIA: ICD-10-CM

## 2021-07-20 DIAGNOSIS — G89.4 CHRONIC PAIN DISORDER: ICD-10-CM

## 2021-07-20 DIAGNOSIS — Z79.899 HIGH RISK MEDICATION USE: Primary | ICD-10-CM

## 2021-07-20 PROCEDURE — 99213 OFFICE O/P EST LOW 20 MIN: CPT | Performed by: PHYSICAL MEDICINE & REHABILITATION

## 2021-07-20 RX ORDER — HYDROCODONE BITARTRATE AND ACETAMINOPHEN 10; 325 MG/1; MG/1
1 TABLET ORAL EVERY 8 HOURS PRN
Qty: 90 TABLET | Refills: 0 | Status: SHIPPED | OUTPATIENT
Start: 2021-07-20 | End: 2021-07-31

## 2021-07-20 RX ORDER — HYDROCODONE BITARTRATE AND ACETAMINOPHEN 10; 325 MG/1; MG/1
1 TABLET ORAL 2 TIMES DAILY PRN
Qty: 60 TABLET | Refills: 0 | Status: SHIPPED | OUTPATIENT
Start: 2021-07-20 | End: 2021-10-19 | Stop reason: SDUPTHER

## 2021-07-20 NOTE — PROGRESS NOTES
Subjective   Nicki Fraser is a 55 y.o. female.     Neck and back pain, 10/10 at worst, 7/10 at best, always present, varies, aching, stabbing, worse with bending and lifting, interferes with ADLs, sleep, activity, failed PT, chiropractor, meds. MRI C-spine with DDD, stenosis. Saw Dr. Overton, had PT, given Norco 10mg TID prn, began receiving opioids from PCP, down to BID with less relief, PCP retiring, increased here to TID prn. Failed injections, surgery in past. Back pain worse after housework, no b/b incontinence, no new weakness or numbness. Worsening b/l thumb and great toe pain.       The following portions of the patient's history were reviewed and updated as appropriate: allergies, current medications, past family history, past medical history, past social history, past surgical history and problem list.    Review of Systems   Constitutional: Positive for fatigue. Negative for chills and fever.   HENT: Negative for hearing loss and trouble swallowing.    Eyes: Negative for visual disturbance.   Respiratory: Negative for shortness of breath.    Cardiovascular: Negative for chest pain.   Gastrointestinal: Positive for constipation and nausea. Negative for abdominal pain, diarrhea and vomiting.   Genitourinary: Negative for urinary incontinence.   Musculoskeletal: Positive for arthralgias, back pain, joint swelling and neck pain. Negative for myalgias.   Neurological: Positive for dizziness and headache. Negative for weakness and numbness.       Objective   Physical Exam   Constitutional: She is oriented to person, place, and time. She appears well-developed and well-nourished.   HENT:   Head: Normocephalic and atraumatic.   Eyes: Pupils are equal, round, and reactive to light.   Cardiovascular: Normal rate, regular rhythm and normal heart sounds.   Pulmonary/Chest: Breath sounds normal.   Abdominal: Soft. Bowel sounds are normal. She exhibits no distension. There is no abdominal tenderness.    Neurological: She is alert and oriented to person, place, and time. She has normal reflexes. She displays normal reflexes. No sensory deficit.   Psychiatric: Her behavior is normal. Thought content normal.         Assessment/Plan   Diagnoses and all orders for this visit:    1. Chronic bilateral low back pain with sciatica, sciatica laterality unspecified (Primary)    2. Chronic bilateral thoracic back pain    3. Chronic pain disorder    4. Fibromyalgia    5. Myalgia    6. Cervical radiculopathy    7. Cervical spinal stenosis    8. Cervicalgia      Inspect reviewed, in order. UDS in order 7/31/20.  Treatment plan will consist of continuing current medication as long as it remains effective and is necessary, while evaluating patient at each visit and determining if the medication can be lowered or discontinued, while also using nonopioid therapies to reduce reliance on opioids.  Increased to Norco 10mg QID prn, then reduced to TID prn, could not tolerate BID prn, doing well, denies side effects.  Began Tizanidine 4mg TID prn.  Began Lyrica 50mg BID, cannot tolerate Gabapentin.  Failed injections, surgery.  Insurance will not cover comp cream. Cont Voltaren.  Pt will ask PCP for referral to allergist for possible bee sting allergy.  RTC 3 months for f/u.     ADD: UDS (+) for THC, will d/w patient next visit.

## 2021-07-27 ENCOUNTER — HOSPITAL ENCOUNTER (OUTPATIENT)
Dept: CARDIOLOGY | Facility: HOSPITAL | Age: 56
Discharge: HOME | End: 2021-07-27
Admitting: FAMILY MEDICINE

## 2021-07-27 DIAGNOSIS — R09.89 OTHER SPECIFIED SYMPTOMS AND SIGNS INVOLVING THE CIRCULATORY AND RESPIRATORY SYSTEMS: ICD-10-CM

## 2021-07-27 DIAGNOSIS — M79.604 LEG PAIN, BILATERAL: ICD-10-CM

## 2021-07-27 DIAGNOSIS — M79.605 LEG PAIN, BILATERAL: ICD-10-CM

## 2021-07-27 LAB
BH CV LOWER ARTERIAL LEFT ABI RATIO: 1.15
BH CV LOWER ARTERIAL LEFT DORSALIS PEDIS SYS MAX: 171 MMHG
BH CV LOWER ARTERIAL LEFT GREAT TOE SYS MAX: 144 MMHG
BH CV LOWER ARTERIAL LEFT POST TIBIAL SYS MAX: 188 MMHG
BH CV LOWER ARTERIAL LEFT TBI RATIO: 0.88
BH CV LOWER ARTERIAL RIGHT ABI RATIO: 1.16
BH CV LOWER ARTERIAL RIGHT DORSALIS PEDIS SYS MAX: 179 MMHG
BH CV LOWER ARTERIAL RIGHT GREAT TOE SYS MAX: 162 MMHG
BH CV LOWER ARTERIAL RIGHT POST TIBIAL SYS MAX: 189 MMHG
BH CV LOWER ARTERIAL RIGHT TBI RATIO: 0.99
MAXIMAL PREDICTED HEART RATE: 164 BPM
STRESS TARGET HR: 139 BPM
UPPER ARTERIAL LEFT ARM BRACHIAL SYS MAX: 161 MMHG
UPPER ARTERIAL RIGHT ARM BRACHIAL SYS MAX: 163 MMHG

## 2021-07-27 PROCEDURE — 93922 UPR/L XTREMITY ART 2 LEVELS: CPT

## 2021-07-27 RX ORDER — SPIRONOLACTONE 25 MG/1
25 TABLET ORAL DAILY
Qty: 30 TABLET | Refills: 1 | Status: SHIPPED | OUTPATIENT
Start: 2021-07-27 | End: 2021-09-23

## 2021-07-28 ENCOUNTER — PATIENT MESSAGE (OUTPATIENT)
Dept: FAMILY MEDICINE CLINIC | Facility: CLINIC | Age: 56
End: 2021-07-28

## 2021-07-28 NOTE — TELEPHONE ENCOUNTER
Pt informed and said you were going to send an order for the hose to see if her ins would cover the compression socks/hose? She did not know where you had said. ([facility]?)    Also, states she's started the new water pill and is tolerating well.

## 2021-07-30 ENCOUNTER — CLINICAL SUPPORT (OUTPATIENT)
Dept: FAMILY MEDICINE CLINIC | Facility: CLINIC | Age: 56
End: 2021-07-30

## 2021-07-30 VITALS — SYSTOLIC BLOOD PRESSURE: 127 MMHG | HEART RATE: 82 BPM | DIASTOLIC BLOOD PRESSURE: 78 MMHG

## 2021-07-30 DIAGNOSIS — I10 ESSENTIAL HYPERTENSION: Chronic | ICD-10-CM

## 2021-07-30 PROCEDURE — 36415 COLL VENOUS BLD VENIPUNCTURE: CPT | Performed by: FAMILY MEDICINE

## 2021-07-30 PROCEDURE — 80048 BASIC METABOLIC PNL TOTAL CA: CPT | Performed by: FAMILY MEDICINE

## 2021-07-31 DIAGNOSIS — N28.9 RENAL INSUFFICIENCY: Primary | ICD-10-CM

## 2021-07-31 LAB
ANION GAP SERPL CALCULATED.3IONS-SCNC: 13 MMOL/L (ref 5–15)
BUN SERPL-MCNC: 27 MG/DL (ref 6–20)
BUN/CREAT SERPL: 20.9 (ref 7–25)
CALCIUM SPEC-SCNC: 9.8 MG/DL (ref 8.6–10.5)
CHLORIDE SERPL-SCNC: 108 MMOL/L (ref 98–107)
CO2 SERPL-SCNC: 23 MMOL/L (ref 22–29)
CREAT SERPL-MCNC: 1.29 MG/DL (ref 0.57–1)
GFR SERPL CREATININE-BSD FRML MDRD: 43 ML/MIN/1.73
GLUCOSE SERPL-MCNC: 87 MG/DL (ref 65–99)
POTASSIUM SERPL-SCNC: 4.7 MMOL/L (ref 3.5–5.2)
SODIUM SERPL-SCNC: 144 MMOL/L (ref 136–145)

## 2021-08-02 ENCOUNTER — PATIENT MESSAGE (OUTPATIENT)
Dept: FAMILY MEDICINE CLINIC | Facility: CLINIC | Age: 56
End: 2021-08-02

## 2021-08-04 ENCOUNTER — TELEPHONE (OUTPATIENT)
Dept: FAMILY MEDICINE CLINIC | Facility: CLINIC | Age: 56
End: 2021-08-04

## 2021-08-04 NOTE — TELEPHONE ENCOUNTER
8/2/21 9:23 AM  Dea Mcnally, DO   7/31/2021  9:35 AM EDT           Kidneys dry----push fluids and rech in 2weeks     Last read by Nicki Fraser at 9:24 AM on 8/2/2021.  Nicki Fraser  to Torie Manjarrez, RT        8/2/21 9:26 AM  Do i need to setup  a appointment for that  Me  to Nicki Fraser    8/2/21 9:46 AM  Yes call the office to make a blood draw appointment for 2 weeks from now.    Patient has blood work appt pn 8/2/21.

## 2021-08-16 NOTE — TELEPHONE ENCOUNTER
Rx Refill Note  Requested Prescriptions     Pending Prescriptions Disp Refills   • ibuprofen (ADVIL,MOTRIN) 800 MG tablet [Pharmacy Med Name: IBUPROFEN 800 MG TABLET] 90 tablet 0     Sig: TAKE ONE TABLET BY MOUTH EVERY 8 HOURS AS NEEDED FOR MODERATE PAIN      Last office visit with prescribing clinician: 7/16/2021      Next office visit with prescribing clinician: Visit date not found     Basic Metabolic Panel (07/30/2021 11:49)  Lipid Panel (10/27/2020 09:40)         Akanksha Knapp CMA  08/16/21, 08:36 EDT

## 2021-08-17 ENCOUNTER — CLINICAL SUPPORT (OUTPATIENT)
Dept: FAMILY MEDICINE CLINIC | Facility: CLINIC | Age: 56
End: 2021-08-17

## 2021-08-17 DIAGNOSIS — N28.9 RENAL INSUFFICIENCY: ICD-10-CM

## 2021-08-17 PROCEDURE — 36415 COLL VENOUS BLD VENIPUNCTURE: CPT | Performed by: FAMILY MEDICINE

## 2021-08-17 PROCEDURE — 80048 BASIC METABOLIC PNL TOTAL CA: CPT | Performed by: FAMILY MEDICINE

## 2021-08-17 RX ORDER — IBUPROFEN 800 MG/1
TABLET ORAL
Qty: 90 TABLET | Refills: 0 | OUTPATIENT
Start: 2021-08-17

## 2021-08-18 DIAGNOSIS — E66.01 CLASS 2 SEVERE OBESITY DUE TO EXCESS CALORIES WITH SERIOUS COMORBIDITY AND BODY MASS INDEX (BMI) OF 36.0 TO 36.9 IN ADULT (HCC): ICD-10-CM

## 2021-08-18 LAB
ANION GAP SERPL CALCULATED.3IONS-SCNC: 12 MMOL/L (ref 5–15)
BUN SERPL-MCNC: 25 MG/DL (ref 6–20)
BUN/CREAT SERPL: 19.5 (ref 7–25)
CALCIUM SPEC-SCNC: 10 MG/DL (ref 8.6–10.5)
CHLORIDE SERPL-SCNC: 105 MMOL/L (ref 98–107)
CO2 SERPL-SCNC: 22 MMOL/L (ref 22–29)
CREAT SERPL-MCNC: 1.28 MG/DL (ref 0.57–1)
GFR SERPL CREATININE-BSD FRML MDRD: 43 ML/MIN/1.73
GLUCOSE SERPL-MCNC: 117 MG/DL (ref 65–99)
POTASSIUM SERPL-SCNC: 5 MMOL/L (ref 3.5–5.2)
SODIUM SERPL-SCNC: 139 MMOL/L (ref 136–145)

## 2021-08-18 RX ORDER — PHENTERMINE HYDROCHLORIDE 37.5 MG/1
37.5 CAPSULE ORAL EVERY MORNING
Qty: 30 CAPSULE | Refills: 0 | Status: SHIPPED | OUTPATIENT
Start: 2021-08-18 | End: 2021-09-27 | Stop reason: SDUPTHER

## 2021-09-13 RX ORDER — TRIAMTERENE AND HYDROCHLOROTHIAZIDE 37.5; 25 MG/1; MG/1
CAPSULE ORAL
Qty: 60 CAPSULE | OUTPATIENT
Start: 2021-09-13

## 2021-09-13 RX ORDER — POLYETHYLENE GLYCOL 3350 17 G/17G
17 POWDER, FOR SOLUTION ORAL DAILY
Qty: 510 G | Refills: 3 | Status: SHIPPED | OUTPATIENT
Start: 2021-09-13 | End: 2022-02-18 | Stop reason: SDUPTHER

## 2021-09-23 ENCOUNTER — TELEPHONE (OUTPATIENT)
Dept: FAMILY MEDICINE CLINIC | Facility: CLINIC | Age: 56
End: 2021-09-23

## 2021-09-23 RX ORDER — SPIRONOLACTONE 25 MG/1
TABLET ORAL
Qty: 30 TABLET | Refills: 1 | Status: SHIPPED | OUTPATIENT
Start: 2021-09-23 | End: 2021-11-22

## 2021-09-23 NOTE — TELEPHONE ENCOUNTER
Rx Refill Note  Requested Prescriptions     Pending Prescriptions Disp Refills   • spironolactone (ALDACTONE) 25 MG tablet [Pharmacy Med Name: SPIRONOLACTONE 25 MG TABLET] 30 tablet 1     Sig: TAKE ONE TABLET BY MOUTH DAILY      Last office visit with prescribing clinician: 7/16/2021      Next office visit with prescribing clinician: Visit date not found     Basic Metabolic Panel (08/17/2021 14:38)  Lipid Panel (10/27/2020 09:40)         Akanksha Knapp CMA  09/23/21, 09:08 EDT

## 2021-09-23 NOTE — TELEPHONE ENCOUNTER
----- Message from Nicki Fraser sent at 9/23/2021 11:17 AM EDT -----  Regarding: Prescription Question  Contact: [phone]  I need a refill on phentermine.

## 2021-09-23 NOTE — TELEPHONE ENCOUNTER
HUB READ MESSAGE SCHEDULED APPT FOR October AND SHE IS WANTING TO KNOW ABOUT GETTING LABS DONE BEFORE APPT.      PLEASE ADVISE    CALLBACK NUMBER   [phone]

## 2021-09-23 NOTE — TELEPHONE ENCOUNTER
Rx Refill Note  Requested Prescriptions      No prescriptions requested or ordered in this encounter      Last office visit with prescribing clinician: 7/16/2021      Next office visit with prescribing clinician: Visit date not found     Basic Metabolic Panel (08/17/2021 14:38)  Lipid Panel (10/27/2020 09:40)         Akanksha Knapp CMA  09/23/21, 12:04 EDT

## 2021-09-24 DIAGNOSIS — N28.9 RENAL INSUFFICIENCY: Primary | ICD-10-CM

## 2021-09-24 DIAGNOSIS — Z13.220 SCREENING FOR HYPERLIPIDEMIA: ICD-10-CM

## 2021-09-24 DIAGNOSIS — I10 ESSENTIAL HYPERTENSION: ICD-10-CM

## 2021-09-27 DIAGNOSIS — E66.01 CLASS 2 SEVERE OBESITY DUE TO EXCESS CALORIES WITH SERIOUS COMORBIDITY AND BODY MASS INDEX (BMI) OF 36.0 TO 36.9 IN ADULT (HCC): ICD-10-CM

## 2021-09-27 RX ORDER — PHENTERMINE HYDROCHLORIDE 37.5 MG/1
37.5 CAPSULE ORAL EVERY MORNING
Qty: 30 CAPSULE | Refills: 0 | Status: SHIPPED | OUTPATIENT
Start: 2021-09-27 | End: 2021-10-28

## 2021-10-05 ENCOUNTER — CLINICAL SUPPORT (OUTPATIENT)
Dept: FAMILY MEDICINE CLINIC | Facility: CLINIC | Age: 56
End: 2021-10-05

## 2021-10-05 DIAGNOSIS — N28.9 RENAL INSUFFICIENCY: ICD-10-CM

## 2021-10-05 DIAGNOSIS — Z13.220 SCREENING FOR HYPERLIPIDEMIA: ICD-10-CM

## 2021-10-05 DIAGNOSIS — I10 ESSENTIAL HYPERTENSION: ICD-10-CM

## 2021-10-05 PROCEDURE — 80053 COMPREHEN METABOLIC PANEL: CPT | Performed by: FAMILY MEDICINE

## 2021-10-05 PROCEDURE — 80061 LIPID PANEL: CPT | Performed by: FAMILY MEDICINE

## 2021-10-05 PROCEDURE — 36415 COLL VENOUS BLD VENIPUNCTURE: CPT | Performed by: FAMILY MEDICINE

## 2021-10-06 LAB
ALBUMIN SERPL-MCNC: 4.1 G/DL (ref 3.5–5.2)
ALBUMIN/GLOB SERPL: 1.7 G/DL
ALP SERPL-CCNC: 89 U/L (ref 39–117)
ALT SERPL W P-5'-P-CCNC: 15 U/L (ref 1–33)
ANION GAP SERPL CALCULATED.3IONS-SCNC: 11.2 MMOL/L (ref 5–15)
AST SERPL-CCNC: 16 U/L (ref 1–32)
BILIRUB SERPL-MCNC: 0.2 MG/DL (ref 0–1.2)
BUN SERPL-MCNC: 28 MG/DL (ref 6–20)
BUN/CREAT SERPL: 22.8 (ref 7–25)
CALCIUM SPEC-SCNC: 9.3 MG/DL (ref 8.6–10.5)
CHLORIDE SERPL-SCNC: 106 MMOL/L (ref 98–107)
CHOLEST SERPL-MCNC: 183 MG/DL (ref 0–200)
CO2 SERPL-SCNC: 21.8 MMOL/L (ref 22–29)
CREAT SERPL-MCNC: 1.23 MG/DL (ref 0.57–1)
GFR SERPL CREATININE-BSD FRML MDRD: 45 ML/MIN/1.73
GLOBULIN UR ELPH-MCNC: 2.4 GM/DL
GLUCOSE SERPL-MCNC: 94 MG/DL (ref 65–99)
HDLC SERPL-MCNC: 66 MG/DL (ref 40–60)
LDLC SERPL CALC-MCNC: 106 MG/DL (ref 0–100)
LDLC/HDLC SERPL: 1.6 {RATIO}
POTASSIUM SERPL-SCNC: 4.6 MMOL/L (ref 3.5–5.2)
PROT SERPL-MCNC: 6.5 G/DL (ref 6–8.5)
SODIUM SERPL-SCNC: 139 MMOL/L (ref 136–145)
TRIGL SERPL-MCNC: 57 MG/DL (ref 0–150)
VLDLC SERPL-MCNC: 11 MG/DL (ref 5–40)

## 2021-10-12 ENCOUNTER — OFFICE VISIT (OUTPATIENT)
Dept: FAMILY MEDICINE CLINIC | Facility: CLINIC | Age: 56
End: 2021-10-12

## 2021-10-12 VITALS
SYSTOLIC BLOOD PRESSURE: 121 MMHG | TEMPERATURE: 97.1 F | DIASTOLIC BLOOD PRESSURE: 72 MMHG | HEIGHT: 68 IN | BODY MASS INDEX: 35.46 KG/M2 | WEIGHT: 234 LBS | RESPIRATION RATE: 18 BRPM | HEART RATE: 77 BPM | OXYGEN SATURATION: 100 %

## 2021-10-12 DIAGNOSIS — Z01.84 IMMUNITY STATUS TESTING: ICD-10-CM

## 2021-10-12 DIAGNOSIS — N39.41 URGE INCONTINENCE OF URINE: ICD-10-CM

## 2021-10-12 DIAGNOSIS — I10 PRIMARY HYPERTENSION: Chronic | ICD-10-CM

## 2021-10-12 DIAGNOSIS — N28.9 RENAL INSUFFICIENCY: ICD-10-CM

## 2021-10-12 DIAGNOSIS — H92.02 LEFT EAR PAIN: Primary | ICD-10-CM

## 2021-10-12 PROCEDURE — 99214 OFFICE O/P EST MOD 30 MIN: CPT | Performed by: FAMILY MEDICINE

## 2021-10-12 RX ORDER — IBUPROFEN 800 MG/1
800 TABLET ORAL EVERY 8 HOURS PRN
Qty: 90 TABLET | Refills: 0 | Status: SHIPPED | OUTPATIENT
Start: 2021-10-12 | End: 2021-11-08

## 2021-10-12 RX ORDER — ALBUTEROL SULFATE 90 UG/1
2 AEROSOL, METERED RESPIRATORY (INHALATION) EVERY 4 HOURS PRN
Qty: 18 G | Refills: 0 | Status: SHIPPED | OUTPATIENT
Start: 2021-10-12 | End: 2022-12-01 | Stop reason: SDUPTHER

## 2021-10-12 RX ORDER — EPINEPHRINE 0.3 MG/.3ML
0.3 INJECTION SUBCUTANEOUS AS NEEDED
Qty: 1 EACH | Refills: 0 | Status: SHIPPED | OUTPATIENT
Start: 2021-10-12 | End: 2022-10-21 | Stop reason: SDUPTHER

## 2021-10-12 RX ORDER — DILTIAZEM HYDROCHLORIDE 120 MG/1
120 CAPSULE, EXTENDED RELEASE ORAL NIGHTLY
Qty: 90 CAPSULE | Refills: 0 | Status: SHIPPED | OUTPATIENT
Start: 2021-10-12 | End: 2022-01-20 | Stop reason: SDUPTHER

## 2021-10-12 RX ORDER — TOLTERODINE 2 MG/1
2 CAPSULE, EXTENDED RELEASE ORAL DAILY
Qty: 30 CAPSULE | Refills: 0 | Status: SHIPPED | OUTPATIENT
Start: 2021-10-12 | End: 2021-11-08

## 2021-10-19 ENCOUNTER — OFFICE VISIT (OUTPATIENT)
Dept: PAIN MEDICINE | Facility: CLINIC | Age: 56
End: 2021-10-19

## 2021-10-19 VITALS
OXYGEN SATURATION: 98 % | WEIGHT: 234 LBS | HEIGHT: 68 IN | SYSTOLIC BLOOD PRESSURE: 130 MMHG | HEART RATE: 83 BPM | DIASTOLIC BLOOD PRESSURE: 66 MMHG | BODY MASS INDEX: 35.46 KG/M2 | RESPIRATION RATE: 16 BRPM

## 2021-10-19 DIAGNOSIS — M54.12 CERVICAL RADICULOPATHY: ICD-10-CM

## 2021-10-19 DIAGNOSIS — M54.40 CHRONIC BILATERAL LOW BACK PAIN WITH SCIATICA, SCIATICA LATERALITY UNSPECIFIED: ICD-10-CM

## 2021-10-19 DIAGNOSIS — G89.4 CHRONIC PAIN DISORDER: ICD-10-CM

## 2021-10-19 DIAGNOSIS — M54.2 CERVICALGIA: Primary | ICD-10-CM

## 2021-10-19 DIAGNOSIS — M48.02 CERVICAL SPINAL STENOSIS: ICD-10-CM

## 2021-10-19 DIAGNOSIS — M79.7 FIBROMYALGIA: ICD-10-CM

## 2021-10-19 DIAGNOSIS — Z79.899 HIGH RISK MEDICATION USE: Primary | ICD-10-CM

## 2021-10-19 DIAGNOSIS — G89.29 CHRONIC BILATERAL THORACIC BACK PAIN: ICD-10-CM

## 2021-10-19 DIAGNOSIS — M79.10 MYALGIA: ICD-10-CM

## 2021-10-19 DIAGNOSIS — G89.29 CHRONIC BILATERAL LOW BACK PAIN WITH SCIATICA, SCIATICA LATERALITY UNSPECIFIED: ICD-10-CM

## 2021-10-19 DIAGNOSIS — M54.6 CHRONIC BILATERAL THORACIC BACK PAIN: ICD-10-CM

## 2021-10-19 PROCEDURE — 99213 OFFICE O/P EST LOW 20 MIN: CPT | Performed by: PHYSICAL MEDICINE & REHABILITATION

## 2021-10-19 RX ORDER — HYDROCODONE BITARTRATE AND ACETAMINOPHEN 10; 325 MG/1; MG/1
1 TABLET ORAL 2 TIMES DAILY PRN
Qty: 60 TABLET | Refills: 0 | Status: SHIPPED | OUTPATIENT
Start: 2021-10-19 | End: 2022-02-25 | Stop reason: SDUPTHER

## 2021-10-19 RX ORDER — HYDROCODONE BITARTRATE AND ACETAMINOPHEN 10; 325 MG/1; MG/1
1 TABLET ORAL 2 TIMES DAILY PRN
Qty: 60 TABLET | Refills: 0 | Status: SHIPPED | OUTPATIENT
Start: 2021-10-19 | End: 2022-01-20 | Stop reason: SDUPTHER

## 2021-10-19 NOTE — PROGRESS NOTES
Subjective   Nicki Fraser is a 56 y.o. female.     Neck and back pain, 10/10 at worst, 7/10 at best, always present, varies, aching, stabbing, worse with bending and lifting, interferes with ADLs, sleep, activity, failed PT, chiropractor, meds. MRI C-spine with DDD, stenosis. Saw Dr. Overton, had PT, given Norco 10mg TID prn, began receiving opioids from PCP, down to BID with less relief, PCP retiring, increased here to TID prn. Failed injections, surgery in past. Back pain worse after housework, no b/b incontinence, no new weakness or numbness. Worsening b/l thumb and great toe pain.       The following portions of the patient's history were reviewed and updated as appropriate: allergies, current medications, past family history, past medical history, past social history, past surgical history and problem list.    Review of Systems   Constitutional: Positive for fatigue. Negative for chills and fever.   HENT: Negative for hearing loss and trouble swallowing.    Eyes: Negative for visual disturbance.   Respiratory: Negative for shortness of breath.    Cardiovascular: Negative for chest pain.   Gastrointestinal: Positive for constipation and nausea. Negative for abdominal pain, diarrhea and vomiting.   Genitourinary: Negative for urinary incontinence.   Musculoskeletal: Positive for arthralgias, back pain, joint swelling and neck pain. Negative for myalgias.   Neurological: Positive for dizziness and headache. Negative for weakness and numbness.       Objective   Physical Exam   Constitutional: She is oriented to person, place, and time. She appears well-developed and well-nourished.   HENT:   Head: Normocephalic and atraumatic.   Eyes: Pupils are equal, round, and reactive to light.   Cardiovascular: Normal rate, regular rhythm and normal heart sounds.   Pulmonary/Chest: Breath sounds normal.   Abdominal: Soft. Bowel sounds are normal. She exhibits no distension. There is no abdominal tenderness.    Neurological: She is alert and oriented to person, place, and time. She has normal reflexes. She displays normal reflexes. No sensory deficit.   Psychiatric: Her behavior is normal. Thought content normal.         Assessment/Plan   Diagnoses and all orders for this visit:    1. Cervicalgia (Primary)    2. Chronic bilateral low back pain with sciatica, sciatica laterality unspecified    3. Chronic bilateral thoracic back pain    4. Chronic pain disorder    5. Cervical radiculopathy    6. Cervical spinal stenosis    7. Fibromyalgia    8. Myalgia      Inspect reviewed, in order. UDS 7/20/21 with THC, has been around people who use marijuana, repeat today.  Treatment plan will consist of continuing current medication as long as it remains effective and is necessary, while evaluating patient at each visit and determining if the medication can be lowered or discontinued, while also using nonopioid therapies to reduce reliance on opioids.  Increased to Norco 10mg QID prn, then reduced to TID prn, then BID prn, doing well, denies side effects.  Began Tizanidine 4mg TID prn.  Began Lyrica 50mg BID, cannot tolerate Gabapentin.  Failed injections, surgery.  Insurance will not cover comp cream. Cont Voltaren.  Pt will ask PCP for referral to allergist for possible bee sting allergy.  RTC 3 months for f/u.

## 2021-10-28 DIAGNOSIS — E66.01 CLASS 2 SEVERE OBESITY DUE TO EXCESS CALORIES WITH SERIOUS COMORBIDITY AND BODY MASS INDEX (BMI) OF 36.0 TO 36.9 IN ADULT (HCC): ICD-10-CM

## 2021-10-28 RX ORDER — MONTELUKAST SODIUM 10 MG/1
TABLET ORAL
Qty: 90 TABLET | Refills: 2 | Status: SHIPPED | OUTPATIENT
Start: 2021-10-28 | End: 2022-07-21 | Stop reason: SDUPTHER

## 2021-10-28 RX ORDER — FLUTICASONE PROPIONATE 50 MCG
2 SPRAY, SUSPENSION (ML) NASAL DAILY
Qty: 16 G | Refills: 5 | Status: SHIPPED | OUTPATIENT
Start: 2021-10-28 | End: 2022-12-01 | Stop reason: SDUPTHER

## 2021-10-28 RX ORDER — PHENTERMINE HYDROCHLORIDE 37.5 MG/1
CAPSULE ORAL
Qty: 30 CAPSULE | Refills: 0 | Status: SHIPPED | OUTPATIENT
Start: 2021-10-28 | End: 2021-11-28

## 2021-10-28 NOTE — TELEPHONE ENCOUNTER
Rx Refill Note  Requested Prescriptions     Pending Prescriptions Disp Refills   • phentermine 37.5 MG capsule [Pharmacy Med Name: PHENTERMINE 37.5 MG CAPSULE] 30 capsule      Sig: TAKE ONE CAPSULE BY MOUTH EVERY MORNING   • montelukast (SINGULAIR) 10 MG tablet [Pharmacy Med Name: MONTELUKAST SOD 10 MG TABLET] 90 tablet 2     Sig: TAKE ONE TABLET BY MOUTH DAILY      Last office visit with prescribing clinician: 10/12/2021      Next office visit with prescribing clinician: 10/28/2021     Lipid Panel (10/05/2021 11:26)  Basic Metabolic Panel (10/12/2021 15:42)         Akanksha Knapp CMA  10/28/21, 10:35 EDT

## 2021-11-08 RX ORDER — IBUPROFEN 800 MG/1
TABLET ORAL
Qty: 90 TABLET | Refills: 0 | Status: SHIPPED | OUTPATIENT
Start: 2021-11-08 | End: 2021-12-07

## 2021-11-08 RX ORDER — TOLTERODINE 2 MG/1
CAPSULE, EXTENDED RELEASE ORAL
Qty: 30 CAPSULE | Refills: 0 | Status: SHIPPED | OUTPATIENT
Start: 2021-11-08 | End: 2021-12-07

## 2021-11-08 NOTE — TELEPHONE ENCOUNTER
Rx Refill Note  Requested Prescriptions     Pending Prescriptions Disp Refills   • tolterodine LA (DETROL LA) 2 MG 24 hr capsule [Pharmacy Med Name: TOLTERODINE TART ER 2 MG CAP] 30 capsule 0     Sig: TAKE ONE CAPSULE BY MOUTH DAILY   • ibuprofen (ADVIL,MOTRIN) 800 MG tablet [Pharmacy Med Name: IBUPROFEN 800 MG TABLET] 90 tablet 0     Sig: TAKE ONE TABLET BY MOUTH EVERY 8 HOURS AS NEEDED FOR MODERATE PAIN      Last office visit with prescribing clinician: 10/12/2021      Next office visit with prescribing clinician: 1/20/2022     Comprehensive Metabolic Panel (10/05/2021 11:26)  Lipid Panel (10/05/2021 11:26)         Akanksha Knapp CMA  11/08/21, 09:04 EST

## 2021-11-22 RX ORDER — SPIRONOLACTONE 25 MG/1
TABLET ORAL
Qty: 30 TABLET | Refills: 1 | Status: SHIPPED | OUTPATIENT
Start: 2021-11-22 | End: 2022-01-20 | Stop reason: SDUPTHER

## 2021-11-23 DIAGNOSIS — E66.01 CLASS 2 SEVERE OBESITY DUE TO EXCESS CALORIES WITH SERIOUS COMORBIDITY AND BODY MASS INDEX (BMI) OF 36.0 TO 36.9 IN ADULT (HCC): ICD-10-CM

## 2021-11-23 NOTE — TELEPHONE ENCOUNTER
Rx Refill Note  Requested Prescriptions     Pending Prescriptions Disp Refills   • phentermine 37.5 MG capsule [Pharmacy Med Name: PHENTERMINE 37.5 MG CAPSULE] 30 capsule      Sig: TAKE ONE CAPSULE BY MOUTH EVERY MORNING      Last office visit with prescribing clinician: 10/12/2021      Next office visit with prescribing clinician: 1/20/2022     Lipid Panel (10/05/2021 11:26)         Torie Manjarrez, RT  11/23/21, 09:43 EST

## 2021-11-28 RX ORDER — PHENTERMINE HYDROCHLORIDE 37.5 MG/1
CAPSULE ORAL
Qty: 30 CAPSULE | Refills: 0 | Status: SHIPPED | OUTPATIENT
Start: 2021-11-28 | End: 2021-12-27

## 2021-12-07 RX ORDER — TOLTERODINE 2 MG/1
CAPSULE, EXTENDED RELEASE ORAL
Qty: 30 CAPSULE | Refills: 0 | Status: SHIPPED | OUTPATIENT
Start: 2021-12-07 | End: 2022-01-07

## 2021-12-07 RX ORDER — IBUPROFEN 800 MG/1
TABLET ORAL
Qty: 90 TABLET | Refills: 0 | Status: SHIPPED | OUTPATIENT
Start: 2021-12-07 | End: 2022-01-07

## 2021-12-27 DIAGNOSIS — E66.01 CLASS 2 SEVERE OBESITY DUE TO EXCESS CALORIES WITH SERIOUS COMORBIDITY AND BODY MASS INDEX (BMI) OF 36.0 TO 36.9 IN ADULT (HCC): ICD-10-CM

## 2021-12-27 RX ORDER — PHENTERMINE HYDROCHLORIDE 37.5 MG/1
CAPSULE ORAL
Qty: 30 CAPSULE | Refills: 0 | Status: SHIPPED | OUTPATIENT
Start: 2021-12-27 | End: 2022-01-26

## 2021-12-27 NOTE — TELEPHONE ENCOUNTER
Rx Refill Note  Requested Prescriptions     Pending Prescriptions Disp Refills   • phentermine 37.5 MG capsule [Pharmacy Med Name: PHENTERMINE 37.5 MG CAPSULE] 30 capsule      Sig: TAKE ONE CAPSULE BY MOUTH EVERY MORNING      Last office visit with prescribing clinician: 10/12/2021      Next office visit with prescribing clinician: 1/20/2022     Lipid Panel (10/05/2021 11:26)         Torie Manjarrez, RT  12/27/21, 12:07 EST

## 2022-01-07 RX ORDER — TOLTERODINE 2 MG/1
CAPSULE, EXTENDED RELEASE ORAL
Qty: 30 CAPSULE | Refills: 0 | Status: SHIPPED | OUTPATIENT
Start: 2022-01-07 | End: 2022-01-20

## 2022-01-07 RX ORDER — IBUPROFEN 800 MG/1
TABLET ORAL
Qty: 90 TABLET | Refills: 0 | Status: SHIPPED | OUTPATIENT
Start: 2022-01-07 | End: 2022-02-02

## 2022-01-07 NOTE — TELEPHONE ENCOUNTER
Rx Refill Note  Requested Prescriptions     Pending Prescriptions Disp Refills   • ibuprofen (ADVIL,MOTRIN) 800 MG tablet [Pharmacy Med Name: IBUPROFEN 800 MG TABLET] 90 tablet 0     Sig: TAKE ONE TABLET BY MOUTH EVERY 8 HOURS AS NEEDED FOR PAIN   • tolterodine LA (DETROL LA) 2 MG 24 hr capsule [Pharmacy Med Name: TOLTERODINE TART ER 2 MG CAP] 30 capsule 0     Sig: TAKE ONE CAPSULE BY MOUTH DAILY      Last office visit with prescribing clinician: 10/12/2021      Next office visit with prescribing clinician: 1/20/2022     Lipid Panel (10/05/2021 11:26)         Torie Manjarrez, RT  01/07/22, 10:37 EST

## 2022-01-12 ENCOUNTER — CLINICAL SUPPORT (OUTPATIENT)
Dept: FAMILY MEDICINE CLINIC | Facility: CLINIC | Age: 57
End: 2022-01-12

## 2022-01-12 DIAGNOSIS — Z01.84 IMMUNITY STATUS TESTING: ICD-10-CM

## 2022-01-12 DIAGNOSIS — N28.9 RENAL INSUFFICIENCY: ICD-10-CM

## 2022-01-12 PROCEDURE — 36415 COLL VENOUS BLD VENIPUNCTURE: CPT | Performed by: FAMILY MEDICINE

## 2022-01-12 PROCEDURE — 86787 VARICELLA-ZOSTER ANTIBODY: CPT | Performed by: FAMILY MEDICINE

## 2022-01-12 PROCEDURE — 80048 BASIC METABOLIC PNL TOTAL CA: CPT | Performed by: FAMILY MEDICINE

## 2022-01-12 NOTE — PROGRESS NOTES
Venipuncture performed in L ARM by RT Katie  with good hemostasis. Patient tolerated well. 01/12/22 Torie Manjarrez, RT

## 2022-01-13 LAB
ANION GAP SERPL CALCULATED.3IONS-SCNC: 12 MMOL/L (ref 5–15)
BUN SERPL-MCNC: 25 MG/DL (ref 6–20)
BUN/CREAT SERPL: 21.9 (ref 7–25)
CALCIUM SPEC-SCNC: 9.6 MG/DL (ref 8.6–10.5)
CHLORIDE SERPL-SCNC: 105 MMOL/L (ref 98–107)
CO2 SERPL-SCNC: 25 MMOL/L (ref 22–29)
CREAT SERPL-MCNC: 1.14 MG/DL (ref 0.57–1)
GFR SERPL CREATININE-BSD FRML MDRD: 49 ML/MIN/1.73
GLUCOSE SERPL-MCNC: 100 MG/DL (ref 65–99)
POTASSIUM SERPL-SCNC: 4.4 MMOL/L (ref 3.5–5.2)
SODIUM SERPL-SCNC: 142 MMOL/L (ref 136–145)

## 2022-01-14 LAB — VZV IGG SER IA-ACNC: NORMAL

## 2022-01-20 ENCOUNTER — OFFICE VISIT (OUTPATIENT)
Dept: FAMILY MEDICINE CLINIC | Facility: CLINIC | Age: 57
End: 2022-01-20

## 2022-01-20 VITALS
TEMPERATURE: 98.2 F | HEART RATE: 87 BPM | HEIGHT: 68 IN | WEIGHT: 249 LBS | BODY MASS INDEX: 37.74 KG/M2 | SYSTOLIC BLOOD PRESSURE: 144 MMHG | RESPIRATION RATE: 16 BRPM | OXYGEN SATURATION: 99 % | DIASTOLIC BLOOD PRESSURE: 81 MMHG

## 2022-01-20 DIAGNOSIS — Z82.62 FAMILY HISTORY OF OSTEOPOROSIS: ICD-10-CM

## 2022-01-20 DIAGNOSIS — I10 PRIMARY HYPERTENSION: Chronic | ICD-10-CM

## 2022-01-20 DIAGNOSIS — Z72.0 TOBACCO ABUSE: ICD-10-CM

## 2022-01-20 DIAGNOSIS — E66.01 CLASS 2 SEVERE OBESITY DUE TO EXCESS CALORIES WITH SERIOUS COMORBIDITY AND BODY MASS INDEX (BMI) OF 37.0 TO 37.9 IN ADULT: Primary | ICD-10-CM

## 2022-01-20 DIAGNOSIS — Z78.0 POSTMENOPAUSAL: ICD-10-CM

## 2022-01-20 DIAGNOSIS — Z12.31 ENCOUNTER FOR SCREENING MAMMOGRAM FOR BREAST CANCER: ICD-10-CM

## 2022-01-20 DIAGNOSIS — J43.1 PANLOBULAR EMPHYSEMA: Chronic | ICD-10-CM

## 2022-01-20 PROCEDURE — 99213 OFFICE O/P EST LOW 20 MIN: CPT | Performed by: FAMILY MEDICINE

## 2022-01-20 RX ORDER — DILTIAZEM HYDROCHLORIDE 180 MG/1
180 CAPSULE, EXTENDED RELEASE ORAL NIGHTLY
Qty: 30 CAPSULE | Refills: 1 | Status: SHIPPED | OUTPATIENT
Start: 2022-01-20 | End: 2022-04-21

## 2022-01-20 RX ORDER — TOPIRAMATE 50 MG/1
50 TABLET, FILM COATED ORAL DAILY
Qty: 90 TABLET | Refills: 1 | Status: SHIPPED | OUTPATIENT
Start: 2022-01-20 | End: 2022-07-21 | Stop reason: SDUPTHER

## 2022-01-20 RX ORDER — SPIRONOLACTONE 25 MG/1
25 TABLET ORAL DAILY
Qty: 90 TABLET | Refills: 1 | Status: SHIPPED | OUTPATIENT
Start: 2022-01-20 | End: 2022-07-25

## 2022-01-25 DIAGNOSIS — E66.01 CLASS 2 SEVERE OBESITY DUE TO EXCESS CALORIES WITH SERIOUS COMORBIDITY AND BODY MASS INDEX (BMI) OF 36.0 TO 36.9 IN ADULT: ICD-10-CM

## 2022-01-25 NOTE — TELEPHONE ENCOUNTER
Rx Refill Note  Requested Prescriptions     Pending Prescriptions Disp Refills   • phentermine 37.5 MG capsule [Pharmacy Med Name: PHENTERMINE 37.5 MG CAPSULE] 30 capsule      Sig: TAKE ONE CAPSULE BY MOUTH EVERY MORNING      Last office visit with prescribing clinician: 1/20/2022      Next office visit with prescribing clinician: 4/21/2022     Lipid Panel (10/05/2021 11:26)         Torie Manjarrez, RT  01/25/22, 16:11 EST

## 2022-01-26 RX ORDER — SPIRONOLACTONE 25 MG/1
TABLET ORAL
Qty: 30 TABLET | OUTPATIENT
Start: 2022-01-26

## 2022-01-26 RX ORDER — PHENTERMINE HYDROCHLORIDE 37.5 MG/1
CAPSULE ORAL
Qty: 30 CAPSULE | Refills: 0 | Status: SHIPPED | OUTPATIENT
Start: 2022-01-26 | End: 2022-02-28 | Stop reason: SDUPTHER

## 2022-01-28 ENCOUNTER — APPOINTMENT (OUTPATIENT)
Dept: PAIN MEDICINE | Facility: CLINIC | Age: 57
End: 2022-01-28

## 2022-02-02 RX ORDER — IBUPROFEN 800 MG/1
TABLET ORAL
Qty: 90 TABLET | Refills: 0 | Status: SHIPPED | OUTPATIENT
Start: 2022-02-02 | End: 2022-03-04

## 2022-02-02 NOTE — TELEPHONE ENCOUNTER
Rx Refill Note  Requested Prescriptions     Pending Prescriptions Disp Refills   • ibuprofen (ADVIL,MOTRIN) 800 MG tablet [Pharmacy Med Name: IBUPROFEN 800 MG TABLET] 90 tablet 0     Sig: TAKE ONE TABLET BY MOUTH EVERY 8 HOURS AS NEEDED FOR PAIN      Last office visit with prescribing clinician: 1/20/2022      Next office visit with prescribing clinician: 4/21/2022     Lipid Panel (10/05/2021 11:26)  Comprehensive Metabolic Panel (10/05/2021 11:26)         Akanksha Knapp CMA  02/02/22, 08:47 EST

## 2022-02-04 ENCOUNTER — TELEPHONE (OUTPATIENT)
Dept: FAMILY MEDICINE CLINIC | Facility: CLINIC | Age: 57
End: 2022-02-04

## 2022-02-04 NOTE — TELEPHONE ENCOUNTER
HUB to read  My chart message sent to the patient.    DEXA= Osteopenia of hips-----work on walking, getting calcium 600mg bid in diet and can check Vitamin D with next lab draw

## 2022-02-14 RX ORDER — DILTIAZEM HYDROCHLORIDE 120 MG/1
CAPSULE, EXTENDED RELEASE ORAL
Qty: 30 CAPSULE | Refills: 0 | Status: SHIPPED | OUTPATIENT
Start: 2022-02-14 | End: 2022-03-18

## 2022-02-18 ENCOUNTER — TELEPHONE (OUTPATIENT)
Dept: FAMILY MEDICINE CLINIC | Facility: CLINIC | Age: 57
End: 2022-02-18

## 2022-02-18 RX ORDER — POLYETHYLENE GLYCOL 3350 17 G/17G
17 POWDER, FOR SOLUTION ORAL DAILY
Qty: 510 G | Refills: 3 | Status: SHIPPED | OUTPATIENT
Start: 2022-02-18 | End: 2022-06-21

## 2022-02-18 NOTE — TELEPHONE ENCOUNTER
Rx Refill Note  Requested Prescriptions      No prescriptions requested or ordered in this encounter      Last office visit with prescribing clinician: 1/20/2022      Next office visit with prescribing clinician: 4/21/2022     Basic Metabolic Panel (01/12/2022 13:29)  Lipid Panel (10/05/2021 11:26)  Comprehensive Metabolic Panel (10/05/2021 11:26)         Akanksha Knapp, [facility]  02/18/22, 12:52 EST     I need a refill on my gavilax powder today please

## 2022-02-18 NOTE — TELEPHONE ENCOUNTER
----- Message from Nicki Fraser sent at 2/18/2022 12:16 PM EST -----  Regarding: Refill   I need a refill on my gavilax powder today please

## 2022-02-25 ENCOUNTER — OFFICE VISIT (OUTPATIENT)
Dept: PAIN MEDICINE | Facility: CLINIC | Age: 57
End: 2022-02-25

## 2022-02-25 VITALS
DIASTOLIC BLOOD PRESSURE: 64 MMHG | WEIGHT: 249 LBS | SYSTOLIC BLOOD PRESSURE: 124 MMHG | HEART RATE: 97 BPM | BODY MASS INDEX: 37.74 KG/M2 | OXYGEN SATURATION: 98 % | RESPIRATION RATE: 16 BRPM | HEIGHT: 68 IN

## 2022-02-25 DIAGNOSIS — M48.02 CERVICAL SPINAL STENOSIS: ICD-10-CM

## 2022-02-25 DIAGNOSIS — G89.29 CHRONIC BILATERAL THORACIC BACK PAIN: ICD-10-CM

## 2022-02-25 DIAGNOSIS — G89.29 CHRONIC BILATERAL LOW BACK PAIN WITH SCIATICA, SCIATICA LATERALITY UNSPECIFIED: Primary | ICD-10-CM

## 2022-02-25 DIAGNOSIS — M54.2 CERVICALGIA: ICD-10-CM

## 2022-02-25 DIAGNOSIS — M79.7 FIBROMYALGIA: ICD-10-CM

## 2022-02-25 DIAGNOSIS — M54.6 CHRONIC BILATERAL THORACIC BACK PAIN: ICD-10-CM

## 2022-02-25 DIAGNOSIS — G89.4 CHRONIC PAIN DISORDER: ICD-10-CM

## 2022-02-25 DIAGNOSIS — M79.10 MYALGIA: ICD-10-CM

## 2022-02-25 DIAGNOSIS — M54.12 CERVICAL RADICULOPATHY: ICD-10-CM

## 2022-02-25 DIAGNOSIS — M54.40 CHRONIC BILATERAL LOW BACK PAIN WITH SCIATICA, SCIATICA LATERALITY UNSPECIFIED: Primary | ICD-10-CM

## 2022-02-25 PROCEDURE — 99213 OFFICE O/P EST LOW 20 MIN: CPT | Performed by: PHYSICAL MEDICINE & REHABILITATION

## 2022-02-25 RX ORDER — HYDROCODONE BITARTRATE AND ACETAMINOPHEN 10; 325 MG/1; MG/1
1 TABLET ORAL 3 TIMES DAILY PRN
Qty: 90 TABLET | Refills: 0 | Status: SHIPPED | OUTPATIENT
Start: 2022-02-25 | End: 2022-04-21

## 2022-02-25 RX ORDER — HYDROCODONE BITARTRATE AND ACETAMINOPHEN 10; 325 MG/1; MG/1
1 TABLET ORAL 3 TIMES DAILY PRN
Qty: 90 TABLET | Refills: 0 | Status: SHIPPED | OUTPATIENT
Start: 2022-02-25 | End: 2022-04-21 | Stop reason: SDUPTHER

## 2022-02-25 RX ORDER — HYDROCODONE BITARTRATE AND ACETAMINOPHEN 10; 325 MG/1; MG/1
1 TABLET ORAL 3 TIMES DAILY PRN
Qty: 90 TABLET | Refills: 0 | Status: SHIPPED | OUTPATIENT
Start: 2022-02-25 | End: 2022-05-26 | Stop reason: SDUPTHER

## 2022-02-25 NOTE — PROGRESS NOTES
Subjective   Nicki Fraser is a 56 y.o. female.     Neck and back pain, 10/10 at worst, 7/10 at best, always present, varies, aching, stabbing, worse with bending and lifting, interferes with ADLs, sleep, activity, failed PT, chiropractor, meds. MRI C-spine with DDD, stenosis. Saw Dr. Overton, had PT, given Norco 10mg TID prn, began receiving opioids from PCP, down to BID with less relief, PCP retiring, increased here to TID prn. Failed injections, surgery in past. Back pain worse after housework, no b/b incontinence, no new weakness or numbness. Worsening b/l thumb and great toe pain.       The following portions of the patient's history were reviewed and updated as appropriate: allergies, current medications, past family history, past medical history, past social history, past surgical history and problem list.    Review of Systems   Constitutional: Positive for fatigue. Negative for chills and fever.   HENT: Negative for hearing loss and trouble swallowing.    Eyes: Negative for visual disturbance.   Respiratory: Negative for shortness of breath.    Cardiovascular: Negative for chest pain.   Gastrointestinal: Positive for constipation and nausea. Negative for abdominal pain, diarrhea and vomiting.   Genitourinary: Negative for urinary incontinence.   Musculoskeletal: Positive for arthralgias, back pain, joint swelling and neck pain. Negative for myalgias.   Neurological: Positive for dizziness and headache. Negative for weakness and numbness.       Objective   Physical Exam   Constitutional: She is oriented to person, place, and time. She appears well-developed and well-nourished.   HENT:   Head: Normocephalic and atraumatic.   Eyes: Pupils are equal, round, and reactive to light.   Cardiovascular: Normal rate, regular rhythm and normal heart sounds.   Pulmonary/Chest: Breath sounds normal.   Abdominal: Soft. Bowel sounds are normal. She exhibits no distension. There is no abdominal tenderness.    Neurological: She is alert and oriented to person, place, and time. She has normal reflexes. She displays normal reflexes. No sensory deficit.   Psychiatric: Her behavior is normal. Thought content normal.         Assessment/Plan   Diagnoses and all orders for this visit:    1. Chronic bilateral low back pain with sciatica, sciatica laterality unspecified (Primary)    2. Chronic bilateral thoracic back pain    3. Chronic pain disorder    4. Fibromyalgia    5. Myalgia    6. Cervical radiculopathy    7. Cervical spinal stenosis    8. Cervicalgia      Inspect reviewed, in order. UDS 7/20/21 with THC, has been around people who use marijuana, repeat UDS in order 10/19/21.  Treatment plan will consist of continuing current medication as long as it remains effective and is necessary, while evaluating patient at each visit and determining if the medication can be lowered or discontinued, while also using nonopioid therapies to reduce reliance on opioids.  Increased to Norco 10mg QID prn, then reduced to TID prn, then BID prn, back to TID prn, doing well, denies side effects.  Began Tizanidine 4mg TID prn.  Began Lyrica 50mg BID, cannot tolerate Gabapentin.  Failed injections, surgery.  Insurance will not cover comp cream. Cont Voltaren.  Pt will ask PCP for referral to allergist for possible bee sting allergy.  RTC 3 months for f/u.

## 2022-02-28 DIAGNOSIS — E66.01 CLASS 2 SEVERE OBESITY DUE TO EXCESS CALORIES WITH SERIOUS COMORBIDITY AND BODY MASS INDEX (BMI) OF 36.0 TO 36.9 IN ADULT: ICD-10-CM

## 2022-02-28 RX ORDER — PHENTERMINE HYDROCHLORIDE 37.5 MG/1
37.5 CAPSULE ORAL EVERY MORNING
Qty: 30 CAPSULE | Refills: 0 | Status: SHIPPED | OUTPATIENT
Start: 2022-02-28 | End: 2022-04-01

## 2022-03-04 RX ORDER — IBUPROFEN 800 MG/1
TABLET ORAL
Qty: 90 TABLET | Refills: 0 | Status: SHIPPED | OUTPATIENT
Start: 2022-03-04 | End: 2022-04-11

## 2022-03-04 NOTE — TELEPHONE ENCOUNTER
Rx Refill Note  Requested Prescriptions     Pending Prescriptions Disp Refills   • ibuprofen (ADVIL,MOTRIN) 800 MG tablet [Pharmacy Med Name: IBUPROFEN 800 MG TABLET] 90 tablet 0     Sig: TAKE ONE TABLET BY MOUTH EVERY 8 HOURS AS NEEDED FOR PAIN      Last office visit with prescribing clinician: 1/20/2022      Next office visit with prescribing clinician: 4/21/2022     Office Visit with Dea Mcnally DO (01/20/2022)  Basic Metabolic Panel (01/12/2022 13:29)  Lipid Panel (10/05/2021 11:26)  Comprehensive Metabolic Panel (10/05/2021 11:26)         Vic Weir  03/04/22, 08:05 EST     Protocol failed for approval

## 2022-03-18 RX ORDER — DILTIAZEM HYDROCHLORIDE 120 MG/1
CAPSULE, EXTENDED RELEASE ORAL
Qty: 30 CAPSULE | Refills: 0 | Status: SHIPPED | OUTPATIENT
Start: 2022-03-18 | End: 2022-04-01

## 2022-03-30 DIAGNOSIS — E66.01 CLASS 2 SEVERE OBESITY DUE TO EXCESS CALORIES WITH SERIOUS COMORBIDITY AND BODY MASS INDEX (BMI) OF 36.0 TO 36.9 IN ADULT: ICD-10-CM

## 2022-04-01 RX ORDER — DILTIAZEM HYDROCHLORIDE 120 MG/1
CAPSULE, EXTENDED RELEASE ORAL
Qty: 30 CAPSULE | Refills: 0 | Status: SHIPPED | OUTPATIENT
Start: 2022-04-01 | End: 2022-05-17 | Stop reason: SDUPTHER

## 2022-04-01 RX ORDER — PHENTERMINE HYDROCHLORIDE 37.5 MG/1
CAPSULE ORAL
Qty: 30 CAPSULE | Refills: 0 | Status: SHIPPED | OUTPATIENT
Start: 2022-04-01 | End: 2022-05-09 | Stop reason: SDUPTHER

## 2022-04-01 NOTE — TELEPHONE ENCOUNTER
Rx Refill Note  Requested Prescriptions     Pending Prescriptions Disp Refills   • dilTIAZem XR (DILACOR XR) 120 MG 24 hr capsule [Pharmacy Med Name: DILTIAZEM 24H ER(XR) 120 MG CP] 30 capsule 0     Sig: TAKE ONE CAPSULE BY MOUTH ONCE NIGHTLY      Last office visit with prescribing clinician: 1/20/2022      Next office visit with prescribing clinician: 4/21/2022     Lipid Panel (10/05/2021 11:26)         Torie Manjarrez, RT  04/01/22, 09:31 EDT

## 2022-04-11 RX ORDER — IBUPROFEN 800 MG/1
TABLET ORAL
Qty: 90 TABLET | Refills: 0 | Status: SHIPPED | OUTPATIENT
Start: 2022-04-11 | End: 2022-05-10

## 2022-04-21 ENCOUNTER — OFFICE VISIT (OUTPATIENT)
Dept: FAMILY MEDICINE CLINIC | Facility: CLINIC | Age: 57
End: 2022-04-21

## 2022-04-21 VITALS
TEMPERATURE: 97.5 F | RESPIRATION RATE: 18 BRPM | SYSTOLIC BLOOD PRESSURE: 113 MMHG | BODY MASS INDEX: 36.68 KG/M2 | WEIGHT: 242 LBS | HEIGHT: 68 IN | HEART RATE: 94 BPM | OXYGEN SATURATION: 100 % | DIASTOLIC BLOOD PRESSURE: 71 MMHG

## 2022-04-21 DIAGNOSIS — I10 PRIMARY HYPERTENSION: Primary | Chronic | ICD-10-CM

## 2022-04-21 DIAGNOSIS — M85.859 OSTEOPENIA OF HIP, UNSPECIFIED LATERALITY: ICD-10-CM

## 2022-04-21 DIAGNOSIS — M85.80 OTHER SPECIFIED DISORDERS OF BONE DENSITY AND STRUCTURE, UNSPECIFIED SITE: ICD-10-CM

## 2022-04-21 DIAGNOSIS — Z12.11 COLON CANCER SCREENING: ICD-10-CM

## 2022-04-21 DIAGNOSIS — E66.01 CLASS 2 SEVERE OBESITY DUE TO EXCESS CALORIES WITH SERIOUS COMORBIDITY AND BODY MASS INDEX (BMI) OF 36.0 TO 36.9 IN ADULT: ICD-10-CM

## 2022-04-21 DIAGNOSIS — N28.9 RENAL INSUFFICIENCY: ICD-10-CM

## 2022-04-21 PROCEDURE — 36415 COLL VENOUS BLD VENIPUNCTURE: CPT | Performed by: FAMILY MEDICINE

## 2022-04-21 PROCEDURE — 80048 BASIC METABOLIC PNL TOTAL CA: CPT | Performed by: FAMILY MEDICINE

## 2022-04-21 PROCEDURE — 99213 OFFICE O/P EST LOW 20 MIN: CPT | Performed by: FAMILY MEDICINE

## 2022-04-21 PROCEDURE — 82306 VITAMIN D 25 HYDROXY: CPT | Performed by: FAMILY MEDICINE

## 2022-04-22 ENCOUNTER — TELEPHONE (OUTPATIENT)
Dept: FAMILY MEDICINE CLINIC | Facility: CLINIC | Age: 57
End: 2022-04-22

## 2022-04-22 LAB
25(OH)D3 SERPL-MCNC: 92.8 NG/ML (ref 30–100)
ANION GAP SERPL CALCULATED.3IONS-SCNC: 12 MMOL/L (ref 5–15)
BUN SERPL-MCNC: 30 MG/DL (ref 6–20)
BUN/CREAT SERPL: 25 (ref 7–25)
CALCIUM SPEC-SCNC: 9.5 MG/DL (ref 8.6–10.5)
CHLORIDE SERPL-SCNC: 106 MMOL/L (ref 98–107)
CO2 SERPL-SCNC: 22 MMOL/L (ref 22–29)
CREAT SERPL-MCNC: 1.2 MG/DL (ref 0.57–1)
EGFRCR SERPLBLD CKD-EPI 2021: 52.9 ML/MIN/1.73
GLUCOSE SERPL-MCNC: 114 MG/DL (ref 65–99)
POTASSIUM SERPL-SCNC: 4.6 MMOL/L (ref 3.5–5.2)
SODIUM SERPL-SCNC: 140 MMOL/L (ref 136–145)

## 2022-04-22 NOTE — TELEPHONE ENCOUNTER
HUB to share    Vit D high normal------@ 93 can hold vit D x 3mos  BMP shows dry kidneys---push more fluids    LVM informing pt

## 2022-05-09 DIAGNOSIS — E66.01 CLASS 2 SEVERE OBESITY DUE TO EXCESS CALORIES WITH SERIOUS COMORBIDITY AND BODY MASS INDEX (BMI) OF 36.0 TO 36.9 IN ADULT: ICD-10-CM

## 2022-05-09 RX ORDER — PHENTERMINE HYDROCHLORIDE 37.5 MG/1
37.5 CAPSULE ORAL EVERY MORNING
Qty: 30 CAPSULE | Refills: 0 | Status: SHIPPED | OUTPATIENT
Start: 2022-05-09 | End: 2022-06-06

## 2022-05-10 RX ORDER — IBUPROFEN 800 MG/1
TABLET ORAL
Qty: 90 TABLET | Refills: 0 | Status: SHIPPED | OUTPATIENT
Start: 2022-05-10 | End: 2022-06-06

## 2022-05-17 ENCOUNTER — TELEPHONE (OUTPATIENT)
Dept: PAIN MEDICINE | Facility: CLINIC | Age: 57
End: 2022-05-17

## 2022-05-17 RX ORDER — DILTIAZEM HYDROCHLORIDE 120 MG/1
120 CAPSULE, EXTENDED RELEASE ORAL NIGHTLY
Qty: 90 CAPSULE | Refills: 1 | Status: SHIPPED | OUTPATIENT
Start: 2022-05-17 | End: 2022-11-11

## 2022-05-17 NOTE — TELEPHONE ENCOUNTER
Provider: DR. LOAIZA    Caller: PATIENT    Relationship to Patient: SELF    Phone Number:  [phone]    Reason for Call:  PT. HAS APPT. ON 05/24/22- SHE STATES THAT SHE WAS EXPOSED TO SOMEONE WITH COVID ON 05/16/22- YESTERDAY.   SHE IS CHECKING TO SEE HOW LONG SHE NEEDS TO BE IN QU[place]INE BEFORE RESCHEDULING.   ALSO, SHE CANNOT DO APPT.  05/24/22- HAS A CONFLICT, AND NEEDS TO RESCHEDULE- BUT WASN'T SURE HOW LONG SHE SHOULD WAIT TO MAKE APPT.   PLEASE ADVISE.

## 2022-05-26 ENCOUNTER — OFFICE VISIT (OUTPATIENT)
Dept: PAIN MEDICINE | Facility: CLINIC | Age: 57
End: 2022-05-26

## 2022-05-26 VITALS
WEIGHT: 242 LBS | BODY MASS INDEX: 36.81 KG/M2 | RESPIRATION RATE: 16 BRPM | DIASTOLIC BLOOD PRESSURE: 70 MMHG | HEART RATE: 89 BPM | OXYGEN SATURATION: 98 % | SYSTOLIC BLOOD PRESSURE: 151 MMHG

## 2022-05-26 DIAGNOSIS — M48.02 CERVICAL SPINAL STENOSIS: ICD-10-CM

## 2022-05-26 DIAGNOSIS — M79.7 FIBROMYALGIA: ICD-10-CM

## 2022-05-26 DIAGNOSIS — M54.6 CHRONIC BILATERAL THORACIC BACK PAIN: ICD-10-CM

## 2022-05-26 DIAGNOSIS — M54.12 CERVICAL RADICULOPATHY: ICD-10-CM

## 2022-05-26 DIAGNOSIS — G43.C0 PERIODIC HEADACHE SYNDROME, NOT INTRACTABLE: ICD-10-CM

## 2022-05-26 DIAGNOSIS — M54.2 CERVICALGIA: ICD-10-CM

## 2022-05-26 DIAGNOSIS — M54.40 CHRONIC BILATERAL LOW BACK PAIN WITH SCIATICA, SCIATICA LATERALITY UNSPECIFIED: Primary | ICD-10-CM

## 2022-05-26 DIAGNOSIS — G89.29 CHRONIC BILATERAL LOW BACK PAIN WITH SCIATICA, SCIATICA LATERALITY UNSPECIFIED: Primary | ICD-10-CM

## 2022-05-26 DIAGNOSIS — G89.4 CHRONIC PAIN DISORDER: ICD-10-CM

## 2022-05-26 DIAGNOSIS — M79.10 MYALGIA: ICD-10-CM

## 2022-05-26 DIAGNOSIS — G89.29 CHRONIC BILATERAL THORACIC BACK PAIN: ICD-10-CM

## 2022-05-26 PROCEDURE — 99213 OFFICE O/P EST LOW 20 MIN: CPT | Performed by: PHYSICAL MEDICINE & REHABILITATION

## 2022-05-26 RX ORDER — TIZANIDINE 4 MG/1
4 TABLET ORAL EVERY 8 HOURS PRN
Qty: 90 TABLET | Refills: 2 | Status: SHIPPED | OUTPATIENT
Start: 2022-05-26

## 2022-05-26 RX ORDER — HYDROCODONE BITARTRATE AND ACETAMINOPHEN 10; 325 MG/1; MG/1
1 TABLET ORAL 3 TIMES DAILY PRN
Qty: 90 TABLET | Refills: 0 | Status: SHIPPED | OUTPATIENT
Start: 2022-05-26 | End: 2022-10-21

## 2022-05-26 NOTE — PROGRESS NOTES
Subjective   Nicki Fraser is a 56 y.o. female.     Neck and back pain, 10/10 at worst, 7/10 at best, always present, varies, aching, stabbing, worse with bending and lifting, interferes with ADLs, sleep, activity, failed PT, chiropractor, meds. MRI C-spine with DDD, stenosis. Saw Dr. Overton, had PT, given Norco 10mg TID prn, began receiving opioids from PCP, down to BID with less relief, PCP retiring, increased here to TID prn. Failed injections, surgery in past. Back pain worse after housework, no b/b incontinence, no new weakness or numbness. Worsening b/l thumb and great toe pain.       The following portions of the patient's history were reviewed and updated as appropriate: allergies, current medications, past family history, past medical history, past social history, past surgical history and problem list.    Review of Systems   Constitutional: Positive for fatigue. Negative for chills and fever.   HENT: Negative for hearing loss and trouble swallowing.    Eyes: Negative for visual disturbance.   Respiratory: Negative for shortness of breath.    Cardiovascular: Negative for chest pain.   Gastrointestinal: Positive for constipation and nausea. Negative for abdominal pain, diarrhea and vomiting.   Genitourinary: Negative for urinary incontinence.   Musculoskeletal: Positive for arthralgias, back pain, joint swelling and neck pain. Negative for myalgias.   Neurological: Positive for dizziness and headache. Negative for weakness and numbness.       Objective   Physical Exam   Constitutional: She is oriented to person, place, and time. She appears well-developed and well-nourished.   HENT:   Head: Normocephalic and atraumatic.   Eyes: Pupils are equal, round, and reactive to light.   Cardiovascular: Normal rate, regular rhythm and normal heart sounds.   Pulmonary/Chest: Breath sounds normal.   Abdominal: Soft. Bowel sounds are normal. She exhibits no distension. There is no abdominal tenderness.    Neurological: She is alert and oriented to person, place, and time. She has normal reflexes. She displays normal reflexes. No sensory deficit.   Psychiatric: Her behavior is normal. Thought content normal.         Assessment & Plan   Diagnoses and all orders for this visit:    1. Chronic bilateral low back pain with sciatica, sciatica laterality unspecified (Primary)    2. Chronic bilateral thoracic back pain    3. Chronic pain disorder    4. Cervical radiculopathy    5. Cervical spinal stenosis    6. Cervicalgia    7. Fibromyalgia    8. Periodic headache syndrome, not intractable    9. Myalgia      Inspect reviewed, in order. UDS 7/20/21 with THC, has been around people who use marijuana, repeat UDS in order 10/19/21.  Treatment plan will consist of continuing current medication as long as it remains effective and is necessary, while evaluating patient at each visit and determining if the medication can be lowered or discontinued, while also using nonopioid therapies to reduce reliance on opioids.  Increased to Norco 10mg QID prn, then reduced to TID prn, then BID prn, back to TID prn, doing well, denies side effects.  Began Tizanidine 4mg TID prn, refill.  Began Lyrica 50mg BID, cannot tolerate Gabapentin.  Failed injections, surgery.  Insurance will not cover comp cream. Cont Voltaren.  Pt will ask PCP for referral to allergist for possible bee sting allergy.  RTC 3 months for f/u.

## 2022-06-04 DIAGNOSIS — E66.01 CLASS 2 SEVERE OBESITY DUE TO EXCESS CALORIES WITH SERIOUS COMORBIDITY AND BODY MASS INDEX (BMI) OF 36.0 TO 36.9 IN ADULT: ICD-10-CM

## 2022-06-06 RX ORDER — IBUPROFEN 800 MG/1
TABLET ORAL
Qty: 90 TABLET | Refills: 0 | Status: SHIPPED | OUTPATIENT
Start: 2022-06-06 | End: 2022-07-06

## 2022-06-06 RX ORDER — PHENTERMINE HYDROCHLORIDE 37.5 MG/1
CAPSULE ORAL
Qty: 30 CAPSULE | Refills: 0 | Status: SHIPPED | OUTPATIENT
Start: 2022-06-06 | End: 2022-07-11 | Stop reason: SDUPTHER

## 2022-06-06 NOTE — TELEPHONE ENCOUNTER
Rx Refill Note  Requested Prescriptions     Pending Prescriptions Disp Refills   • phentermine 37.5 MG capsule [Pharmacy Med Name: PHENTERMINE 37.5 MG CAPSULE] 30 capsule      Sig: TAKE ONE CAPSULE BY MOUTH EVERY MORNING      Last office visit with prescribing clinician: 4/21/2022      Next office visit with prescribing clinician: 6/5/2022     Basic Metabolic Panel (04/21/2022 14:08)         Torie Manjarrez, RT  06/06/22, 09:20 EDT

## 2022-06-06 NOTE — TELEPHONE ENCOUNTER
Rx Refill Note  Requested Prescriptions     Pending Prescriptions Disp Refills   • ibuprofen (ADVIL,MOTRIN) 800 MG tablet [Pharmacy Med Name: IBUPROFEN 800 MG TABLET] 90 tablet 0     Sig: TAKE ONE TABLET BY MOUTH EVERY 8 HOURS AS NEEDED FOR PAIN      Last office visit with prescribing clinician: 4/21/2022      Next office visit with prescribing clinician: 7/21/2022     Basic Metabolic Panel (04/21/2022 14:08)         Torie Manjarrez, RT  06/06/22, 09:27 EDT

## 2022-06-21 RX ORDER — POLYETHYLENE GLYCOL 3350 17 G/17G
POWDER, FOR SOLUTION ORAL
Qty: 476 G | Refills: 1 | Status: SHIPPED | OUTPATIENT
Start: 2022-06-21 | End: 2022-08-09

## 2022-07-06 RX ORDER — IBUPROFEN 800 MG/1
TABLET ORAL
Qty: 90 TABLET | Refills: 0 | Status: SHIPPED | OUTPATIENT
Start: 2022-07-06 | End: 2022-08-08

## 2022-07-06 NOTE — TELEPHONE ENCOUNTER
Rx Refill Note  Requested Prescriptions     Pending Prescriptions Disp Refills   • ibuprofen (ADVIL,MOTRIN) 800 MG tablet [Pharmacy Med Name: IBUPROFEN 800 MG TABLET] 90 tablet 0     Sig: TAKE 1 TABLET BY MOUTH EVERY 8 HOURS AS NEEDED FOR PAIN      Last office visit with prescribing clinician: 4/21/2022      Next office visit with prescribing clinician: 7/21/2022     Basic Metabolic Panel (04/21/2022 14:08)  Lipid Panel (10/05/2021 11:26)         Akanksha Knapp CMA  07/06/22, 08:31 EDT

## 2022-07-11 DIAGNOSIS — E66.01 CLASS 2 SEVERE OBESITY DUE TO EXCESS CALORIES WITH SERIOUS COMORBIDITY AND BODY MASS INDEX (BMI) OF 36.0 TO 36.9 IN ADULT: ICD-10-CM

## 2022-07-11 RX ORDER — PHENTERMINE HYDROCHLORIDE 37.5 MG/1
37.5 CAPSULE ORAL EVERY MORNING
Qty: 30 CAPSULE | Refills: 0 | Status: SHIPPED | OUTPATIENT
Start: 2022-07-11 | End: 2022-07-12

## 2022-07-12 ENCOUNTER — TELEPHONE (OUTPATIENT)
Dept: FAMILY MEDICINE CLINIC | Facility: CLINIC | Age: 57
End: 2022-07-12

## 2022-07-12 RX ORDER — PROMETHAZINE HYDROCHLORIDE 25 MG/1
25 TABLET ORAL EVERY 6 HOURS PRN
Qty: 30 TABLET | Refills: 0 | Status: SHIPPED | OUTPATIENT
Start: 2022-07-12 | End: 2022-07-15

## 2022-07-12 NOTE — TELEPHONE ENCOUNTER
[facility] called stating that phentermine capsules are on manufacture b/o and they need the rx resent stating tablets, if ok.

## 2022-07-15 DIAGNOSIS — E66.01 CLASS 2 SEVERE OBESITY DUE TO EXCESS CALORIES WITH SERIOUS COMORBIDITY AND BODY MASS INDEX (BMI) OF 36.0 TO 36.9 IN ADULT: Primary | ICD-10-CM

## 2022-07-15 RX ORDER — PHENTERMINE HYDROCHLORIDE 37.5 MG/1
37.5 TABLET ORAL
Qty: 30 TABLET | Refills: 0 | Status: SHIPPED | OUTPATIENT
Start: 2022-07-15 | End: 2022-08-10 | Stop reason: SDUPTHER

## 2022-07-21 ENCOUNTER — OFFICE VISIT (OUTPATIENT)
Dept: FAMILY MEDICINE CLINIC | Facility: CLINIC | Age: 57
End: 2022-07-21

## 2022-07-21 VITALS
HEART RATE: 87 BPM | TEMPERATURE: 97.8 F | WEIGHT: 239 LBS | SYSTOLIC BLOOD PRESSURE: 124 MMHG | BODY MASS INDEX: 36.22 KG/M2 | HEIGHT: 68 IN | OXYGEN SATURATION: 96 % | DIASTOLIC BLOOD PRESSURE: 63 MMHG | RESPIRATION RATE: 18 BRPM

## 2022-07-21 DIAGNOSIS — R79.89 LOW VITAMIN D LEVEL: ICD-10-CM

## 2022-07-21 DIAGNOSIS — E66.01 CLASS 2 SEVERE OBESITY DUE TO EXCESS CALORIES WITH SERIOUS COMORBIDITY AND BODY MASS INDEX (BMI) OF 36.0 TO 36.9 IN ADULT: ICD-10-CM

## 2022-07-21 DIAGNOSIS — E67.3 HYPERVITAMINOSIS D: ICD-10-CM

## 2022-07-21 DIAGNOSIS — M79.641 HAND PAIN, RIGHT: Primary | ICD-10-CM

## 2022-07-21 PROCEDURE — 82306 VITAMIN D 25 HYDROXY: CPT | Performed by: FAMILY MEDICINE

## 2022-07-21 PROCEDURE — 86140 C-REACTIVE PROTEIN: CPT | Performed by: FAMILY MEDICINE

## 2022-07-21 PROCEDURE — 99213 OFFICE O/P EST LOW 20 MIN: CPT | Performed by: FAMILY MEDICINE

## 2022-07-21 PROCEDURE — 36415 COLL VENOUS BLD VENIPUNCTURE: CPT | Performed by: FAMILY MEDICINE

## 2022-07-21 PROCEDURE — 85652 RBC SED RATE AUTOMATED: CPT | Performed by: FAMILY MEDICINE

## 2022-07-21 PROCEDURE — 86431 RHEUMATOID FACTOR QUANT: CPT | Performed by: FAMILY MEDICINE

## 2022-07-21 PROCEDURE — 83735 ASSAY OF MAGNESIUM: CPT | Performed by: FAMILY MEDICINE

## 2022-07-21 PROCEDURE — 86038 ANTINUCLEAR ANTIBODIES: CPT | Performed by: FAMILY MEDICINE

## 2022-07-21 RX ORDER — MONTELUKAST SODIUM 10 MG/1
10 TABLET ORAL DAILY
Qty: 90 TABLET | Refills: 2 | Status: SHIPPED | OUTPATIENT
Start: 2022-07-21 | End: 2022-07-25

## 2022-07-21 RX ORDER — TOPIRAMATE 50 MG/1
50 TABLET, FILM COATED ORAL DAILY
Qty: 90 TABLET | Refills: 1 | Status: SHIPPED | OUTPATIENT
Start: 2022-07-21 | End: 2023-02-07 | Stop reason: SDUPTHER

## 2022-07-22 LAB
25(OH)D3 SERPL-MCNC: 79.9 NG/ML (ref 30–100)
ANA SER QL: NEGATIVE
CHROMATIN AB SERPL-ACNC: <10 IU/ML (ref 0–14)
CRP SERPL-MCNC: 1.23 MG/DL (ref 0–0.5)
ERYTHROCYTE [SEDIMENTATION RATE] IN BLOOD: 11 MM/HR (ref 0–30)
MAGNESIUM SERPL-MCNC: 2.1 MG/DL (ref 1.6–2.6)

## 2022-07-25 RX ORDER — SPIRONOLACTONE 25 MG/1
TABLET ORAL
Qty: 90 TABLET | Refills: 1 | Status: SHIPPED | OUTPATIENT
Start: 2022-07-25 | End: 2023-01-27

## 2022-07-25 RX ORDER — MONTELUKAST SODIUM 10 MG/1
TABLET ORAL
Qty: 90 TABLET | Refills: 2 | Status: SHIPPED | OUTPATIENT
Start: 2022-07-25 | End: 2023-02-07 | Stop reason: SDUPTHER

## 2022-08-08 RX ORDER — IBUPROFEN 800 MG/1
TABLET ORAL
Qty: 90 TABLET | Refills: 0 | Status: SHIPPED | OUTPATIENT
Start: 2022-08-08 | End: 2022-09-08

## 2022-08-08 RX ORDER — POLYETHYLENE GLYCOL 3350 17 G/17G
POWDER, FOR SOLUTION ORAL
Qty: 476 G | Refills: 1 | OUTPATIENT
Start: 2022-08-08

## 2022-08-08 NOTE — TELEPHONE ENCOUNTER
Rx Refill Note  Requested Prescriptions     Pending Prescriptions Disp Refills   • GaviLAX 17 GM/SCOOP powder [Pharmacy Med Name: GAVILAX POWDER] 476 g 1     Sig: DISSOLVE 17 GRAMS IN 8 OUNCES OF LIQUID AND DRINK ONCE A DAY      Last office visit with prescribing clinician: 7/21/2022      Next office visit with prescribing clinician: 10/21/2022     Basic Metabolic Panel (04/21/2022 14:08)         Torie Manjarrez, RT  08/08/22, 15:47 EDT

## 2022-08-08 NOTE — TELEPHONE ENCOUNTER
Rx Refill Note  Requested Prescriptions     Pending Prescriptions Disp Refills   • ibuprofen (ADVIL,MOTRIN) 800 MG tablet [Pharmacy Med Name: IBUPROFEN 800 MG TABLET] 90 tablet 0     Sig: TAKE ONE TABLET BY MOUTH EVERY 8 HOURS AS NEEDED FOR PAIN      Last office visit with prescribing clinician: 7/21/2022      Next office visit with prescribing clinician: 10/21/2022     Lipid Panel (10/05/2021 11:26)         Torie Manjarrez, RT  08/08/22, 09:04 EDT   spherecylinderaxisaddprismvertexVAInt VANVExaminer

## 2022-08-09 RX ORDER — POLYETHYLENE GLYCOL 3350 17 G/17G
POWDER, FOR SOLUTION ORAL
Qty: 476 G | Refills: 1 | Status: SHIPPED | OUTPATIENT
Start: 2022-08-09 | End: 2022-10-05

## 2022-08-10 DIAGNOSIS — E66.01 CLASS 2 SEVERE OBESITY DUE TO EXCESS CALORIES WITH SERIOUS COMORBIDITY AND BODY MASS INDEX (BMI) OF 36.0 TO 36.9 IN ADULT: ICD-10-CM

## 2022-08-10 RX ORDER — PHENTERMINE HYDROCHLORIDE 37.5 MG/1
37.5 TABLET ORAL
Qty: 30 TABLET | Refills: 0 | Status: SHIPPED | OUTPATIENT
Start: 2022-08-10 | End: 2022-10-12

## 2022-08-25 ENCOUNTER — OFFICE VISIT (OUTPATIENT)
Dept: PAIN MEDICINE | Facility: CLINIC | Age: 57
End: 2022-08-25

## 2022-08-25 VITALS
SYSTOLIC BLOOD PRESSURE: 141 MMHG | HEART RATE: 81 BPM | DIASTOLIC BLOOD PRESSURE: 72 MMHG | RESPIRATION RATE: 16 BRPM | OXYGEN SATURATION: 96 %

## 2022-08-25 DIAGNOSIS — G89.29 CHRONIC BILATERAL LOW BACK PAIN WITH SCIATICA, SCIATICA LATERALITY UNSPECIFIED: Primary | ICD-10-CM

## 2022-08-25 DIAGNOSIS — M54.2 CERVICALGIA: ICD-10-CM

## 2022-08-25 DIAGNOSIS — M54.6 CHRONIC BILATERAL THORACIC BACK PAIN: ICD-10-CM

## 2022-08-25 DIAGNOSIS — M54.12 CERVICAL RADICULOPATHY: ICD-10-CM

## 2022-08-25 DIAGNOSIS — M48.02 CERVICAL SPINAL STENOSIS: ICD-10-CM

## 2022-08-25 DIAGNOSIS — M79.7 FIBROMYALGIA: ICD-10-CM

## 2022-08-25 DIAGNOSIS — Z79.899 HIGH RISK MEDICATION USE: Primary | ICD-10-CM

## 2022-08-25 DIAGNOSIS — G89.29 CHRONIC BILATERAL THORACIC BACK PAIN: ICD-10-CM

## 2022-08-25 DIAGNOSIS — M54.40 CHRONIC BILATERAL LOW BACK PAIN WITH SCIATICA, SCIATICA LATERALITY UNSPECIFIED: Primary | ICD-10-CM

## 2022-08-25 DIAGNOSIS — M79.10 MYALGIA: ICD-10-CM

## 2022-08-25 PROCEDURE — 99214 OFFICE O/P EST MOD 30 MIN: CPT | Performed by: PHYSICAL MEDICINE & REHABILITATION

## 2022-08-25 RX ORDER — OXYCODONE AND ACETAMINOPHEN 7.5; 325 MG/1; MG/1
1 TABLET ORAL EVERY 8 HOURS PRN
Qty: 90 TABLET | Refills: 0 | Status: SHIPPED | OUTPATIENT
Start: 2022-08-25 | End: 2023-01-04

## 2022-08-25 NOTE — PROGRESS NOTES
Subjective   Nicki Nielsen is a 56 y.o. female.     Neck and back pain, 10/10 at worst, 7/10 at best, always present, varies, aching, stabbing, worse with bending and lifting, interferes with ADLs, sleep, activity, failed PT, chiropractor, meds. MRI C-spine with DDD, stenosis. Saw Dr. Overton, had PT, given Norco 10mg TID prn, began receiving opioids from PCP, down to BID with less relief, PCP retiring, increased here to TID prn. Failed injections, surgery in past. Back pain worse after housework, no b/b incontinence, no new weakness or numbness. Worsening b/l thumb and great toe pain.       The following portions of the patient's history were reviewed and updated as appropriate: allergies, current medications, past family history, past medical history, past social history, past surgical history and problem list.    Review of Systems   Constitutional: Positive for fatigue. Negative for chills and fever.   HENT: Negative for hearing loss and trouble swallowing.    Eyes: Negative for visual disturbance.   Respiratory: Negative for shortness of breath.    Cardiovascular: Negative for chest pain.   Gastrointestinal: Positive for constipation and nausea. Negative for abdominal pain, diarrhea and vomiting.   Genitourinary: Negative for urinary incontinence.   Musculoskeletal: Positive for arthralgias, back pain, joint swelling and neck pain. Negative for myalgias.   Neurological: Positive for dizziness and headache. Negative for weakness and numbness.       Objective   Physical Exam   Constitutional: She is oriented to person, place, and time. She appears well-developed and well-nourished.   HENT:   Head: Normocephalic and atraumatic.   Eyes: Pupils are equal, round, and reactive to light.   Cardiovascular: Normal rate, regular rhythm and normal heart sounds.   Pulmonary/Chest: Breath sounds normal.   Abdominal: Soft. Bowel sounds are normal. She exhibits no distension. There is no abdominal tenderness.    Neurological: She is alert and oriented to person, place, and time. She has normal reflexes. She displays normal reflexes. No sensory deficit.   Psychiatric: Her behavior is normal. Thought content normal.         Assessment & Plan   Diagnoses and all orders for this visit:    1. Chronic bilateral low back pain with sciatica, sciatica laterality unspecified (Primary)    2. Chronic bilateral thoracic back pain    3. Cervicalgia    4. Cervical spinal stenosis    5. Cervical radiculopathy    6. Fibromyalgia    7. Myalgia      Inspect reviewed, in order. UDS 7/20/21 with THC, has been around people who use marijuana, repeat UDS in order 10/19/21.  Treatment plan will consist of continuing current medication as long as it remains effective and is necessary, while evaluating patient at each visit and determining if the medication can be lowered or discontinued, while also using nonopioid therapies to reduce reliance on opioids.  Increased to Norco 10mg QID prn, then reduced to TID prn, then BID prn, back to TID prn, now keeping her awake. Rotate to Percocet 7.5mg TID prn.  Began Tizanidine 4mg TID prn, refill.  Began Lyrica 50mg BID, cannot tolerate Gabapentin.  Failed injections, surgery.  Insurance will not cover comp cream. Cont Voltaren.  Pt will ask PCP for referral to allergist for possible bee sting allergy.  RTC 3 months for f/u.

## 2022-09-08 RX ORDER — IBUPROFEN 800 MG/1
TABLET ORAL
Qty: 90 TABLET | Refills: 0 | Status: SHIPPED | OUTPATIENT
Start: 2022-09-08 | End: 2022-10-05

## 2022-09-08 NOTE — TELEPHONE ENCOUNTER
Rx Refill Note  Requested Prescriptions     Pending Prescriptions Disp Refills   • ibuprofen (ADVIL,MOTRIN) 800 MG tablet [Pharmacy Med Name: IBUPROFEN 800 MG TABLET] 90 tablet 0     Sig: TAKE ONE TABLET BY MOUTH EVERY 8 HOURS AS NEEDED FOR PAIN      Last office visit with prescribing clinician: 7/21/2022      Next office visit with prescribing clinician: 10/21/2022     Basic Metabolic Panel (04/21/2022 14:08)  Lipid Panel (10/05/2021 11:26)         Akanksha Knapp CMA  09/08/22, 08:18 EDT

## 2022-10-05 RX ORDER — POLYETHYLENE GLYCOL 3350 17 G/17G
POWDER, FOR SOLUTION ORAL
Qty: 476 G | Refills: 1 | Status: SHIPPED | OUTPATIENT
Start: 2022-10-05 | End: 2022-12-01 | Stop reason: SDUPTHER

## 2022-10-05 RX ORDER — IBUPROFEN 800 MG/1
TABLET ORAL
Qty: 90 TABLET | Refills: 0 | Status: SHIPPED | OUTPATIENT
Start: 2022-10-05 | End: 2022-11-03

## 2022-10-05 NOTE — TELEPHONE ENCOUNTER
Rx Refill Note  Requested Prescriptions     Pending Prescriptions Disp Refills   • ibuprofen (ADVIL,MOTRIN) 800 MG tablet [Pharmacy Med Name: IBUPROFEN 800 MG TABLET] 90 tablet 0     Sig: TAKE ONE TABLET BY MOUTH EVERY 8 HOURS AS NEEDED FOR PAIN      Last office visit with prescribing clinician: 7/21/2022      Next office visit with prescribing clinician: 10/21/2022     Basic Metabolic Panel (04/21/2022 14:08)         Torie Manjarrez, RT  10/05/22, 08:14 EDT

## 2022-10-05 NOTE — TELEPHONE ENCOUNTER
Rx Refill Note  Requested Prescriptions     Pending Prescriptions Disp Refills   • GaviLAX 17 GM/SCOOP powder [Pharmacy Med Name: GAVILAX POWDER] 476 g 1     Sig: DISSOLVE 17 GRAMS IN 8 OUNCES OF LIQUID AND DRINK ONCE A DAY      Last office visit with prescribing clinician: 7/21/2022      Next office visit with prescribing clinician: 10/21/2022     Basic Metabolic Panel (04/21/2022 14:08)         Torie Manjarrez, RT  10/05/22, 13:55 EDT

## 2022-10-12 DIAGNOSIS — E66.01 CLASS 2 SEVERE OBESITY DUE TO EXCESS CALORIES WITH SERIOUS COMORBIDITY AND BODY MASS INDEX (BMI) OF 36.0 TO 36.9 IN ADULT: ICD-10-CM

## 2022-10-12 RX ORDER — PHENTERMINE HYDROCHLORIDE 37.5 MG/1
TABLET ORAL
Qty: 30 TABLET | Refills: 0 | Status: SHIPPED | OUTPATIENT
Start: 2022-10-12 | End: 2022-11-19 | Stop reason: SDUPTHER

## 2022-10-12 NOTE — TELEPHONE ENCOUNTER
Rx Refill Note  Requested Prescriptions     Pending Prescriptions Disp Refills   • phentermine (ADIPEX-P) 37.5 MG tablet [Pharmacy Med Name: PHENTERMINE 37.5 MG TABLET] 30 tablet      Sig: TAKE ONE TABLET BY MOUTH EVERY MORNING BEFORE BREAKFAST      Last office visit with prescribing clinician: 7/21/2022      Next office visit with prescribing clinician: 10/21/2022     Lipid Panel (10/05/2021 11:26)         Torie Manjarrez, RT  10/12/22, 16:15 EDT

## 2022-10-21 ENCOUNTER — OFFICE VISIT (OUTPATIENT)
Dept: FAMILY MEDICINE CLINIC | Facility: CLINIC | Age: 57
End: 2022-10-21

## 2022-10-21 VITALS
HEART RATE: 97 BPM | BODY MASS INDEX: 35.04 KG/M2 | OXYGEN SATURATION: 100 % | DIASTOLIC BLOOD PRESSURE: 82 MMHG | WEIGHT: 231.2 LBS | SYSTOLIC BLOOD PRESSURE: 134 MMHG | TEMPERATURE: 97.5 F | HEIGHT: 68 IN

## 2022-10-21 DIAGNOSIS — N28.9 RENAL INSUFFICIENCY: ICD-10-CM

## 2022-10-21 DIAGNOSIS — M85.859 OSTEOPENIA OF HIP, UNSPECIFIED LATERALITY: ICD-10-CM

## 2022-10-21 DIAGNOSIS — Z71.6 TOBACCO ABUSE COUNSELING: ICD-10-CM

## 2022-10-21 DIAGNOSIS — Z13.220 SCREENING FOR HYPERLIPIDEMIA: ICD-10-CM

## 2022-10-21 DIAGNOSIS — E66.01 CLASS 2 SEVERE OBESITY DUE TO EXCESS CALORIES WITH SERIOUS COMORBIDITY AND BODY MASS INDEX (BMI) OF 35.0 TO 35.9 IN ADULT: Primary | ICD-10-CM

## 2022-10-21 DIAGNOSIS — R79.89 LOW VITAMIN D LEVEL: ICD-10-CM

## 2022-10-21 PROCEDURE — 99213 OFFICE O/P EST LOW 20 MIN: CPT | Performed by: FAMILY MEDICINE

## 2022-10-21 RX ORDER — OLOPATADINE HYDROCHLORIDE 2 MG/ML
SOLUTION/ DROPS OPHTHALMIC DAILY
COMMUNITY
End: 2023-02-07 | Stop reason: SDUPTHER

## 2022-10-21 RX ORDER — EPINEPHRINE 0.3 MG/.3ML
0.3 INJECTION SUBCUTANEOUS AS NEEDED
Qty: 1 EACH | Refills: 0 | Status: SHIPPED | OUTPATIENT
Start: 2022-10-21

## 2022-10-21 RX ORDER — BUPROPION HYDROCHLORIDE 150 MG/1
150 TABLET ORAL EVERY MORNING
Qty: 30 TABLET | Refills: 1 | Status: SHIPPED | OUTPATIENT
Start: 2022-10-21 | End: 2022-11-18 | Stop reason: SDUPTHER

## 2022-10-21 RX ORDER — CHOLECALCIFEROL (VITAMIN D3) 50 MCG
2000 TABLET ORAL DAILY
Start: 2022-10-21 | End: 2022-11-09

## 2022-10-21 NOTE — PROGRESS NOTES
Subjective   Nicki Nielsen is a 57 y.o. female.     Chief Complaint   Patient presents with   • Obesity     3 MO F/U    • Nicotine Dependence         Current Outpatient Medications:   •  albuterol (PROVENTIL) (2.5 MG/3ML) 0.083% nebulizer solution, Take 2.5 mg by nebulization Every 4 (Four) Hours As Needed for Wheezing., Disp: , Rfl:   •  albuterol sulfate  (90 Base) MCG/ACT inhaler, Inhale 2 puffs Every 4 (Four) Hours As Needed for Wheezing., Disp: 18 g, Rfl: 0  •  dextromethorphan-guaifenesin (MUCINEX DM MAX STRENGTH)  MG per 12 hr tablet, Take 1 tablet by mouth 2 (Two) Times a Day As Needed., Disp: , Rfl:   •  diclofenac (VOLTAREN) 1 % gel gel, Apply 4 g topically to the appropriate area as directed 4 (Four) Times a Day As Needed (joint pain)., Disp: 5 tube, Rfl: 11  •  dilTIAZem XR (DILACOR XR) 120 MG 24 hr capsule, Take 1 capsule by mouth Every Night., Disp: 90 capsule, Rfl: 1  •  EPINEPHrine (EPIPEN) 0.3 MG/0.3ML solution auto-injector injection, Inject 0.3 mL under the skin into the appropriate area as directed As Needed (allergic reaction)., Disp: 1 each, Rfl: 0  •  fluticasone (FLONASE) 50 MCG/ACT nasal spray, 2 sprays by Each Nare route Daily., Disp: 16 g, Rfl: 5  •  GaviLAX 17 GM/SCOOP powder, DISSOLVE 17 GRAMS IN 8 OUNCES OF LIQUID AND DRINK ONCE A DAY, Disp: 476 g, Rfl: 1  •  ibuprofen (ADVIL,MOTRIN) 800 MG tablet, TAKE ONE TABLET BY MOUTH EVERY 8 HOURS AS NEEDED FOR PAIN, Disp: 90 tablet, Rfl: 0  •  loratadine (CLARITIN) 10 MG tablet, Take 10 mg by mouth Daily., Disp: , Rfl:   •  montelukast (SINGULAIR) 10 MG tablet, TAKE ONE TABLET BY MOUTH DAILY, Disp: 90 tablet, Rfl: 2  •  olopatadine (Eye Allergy Itch Relief) 0.2 % solution ophthalmic solution, Administer  to both eyes Daily., Disp: , Rfl:   •  oxyCODONE-acetaminophen (Percocet) 7.5-325 MG per tablet, Take 1 tablet by mouth Every 8 (Eight) Hours As Needed for Moderate Pain ., Disp: 90 tablet, Rfl: 0  •  phentermine  (ADIPEX-P) 37.5 MG tablet, TAKE ONE TABLET BY MOUTH EVERY MORNING BEFORE BREAKFAST, Disp: 30 tablet, Rfl: 0  •  spironolactone (ALDACTONE) 25 MG tablet, TAKE ONE TABLET BY MOUTH DAILY, Disp: 90 tablet, Rfl: 1  •  tiZANidine (ZANAFLEX) 4 MG tablet, Take 1 tablet by mouth Every 8 (Eight) Hours As Needed for Muscle Spasms., Disp: 90 tablet, Rfl: 2  •  topiramate (TOPAMAX) 50 MG tablet, Take 1 tablet by mouth Daily., Disp: 90 tablet, Rfl: 1  •  buPROPion XL (Wellbutrin XL) 150 MG 24 hr tablet, Take 1 tablet by mouth Every Morning., Disp: 30 tablet, Rfl: 1  •  Cholecalciferol (Vitamin D) 50 MCG (2000) tablet, Take 2,000 Units by mouth Daily., Disp: , Rfl:   •  oxyCODONE-acetaminophen (Percocet) 7.5-325 MG per tablet, Take 1 tablet by mouth Every 8 (Eight) Hours As Needed for Moderate Pain ., Disp: 90 tablet, Rfl: 0  •  oxyCODONE-acetaminophen (Percocet) 7.5-325 MG per tablet, Take 1 tablet by mouth Every 8 (Eight) Hours As Needed for Moderate Pain ., Disp: 90 tablet, Rfl: 0    Past Medical History:   Diagnosis Date   • ADHD    • Allergic    • Asthma    • Cervical radiculopathy    • Chronic pain disorder 2020    Neck and left arm   • COPD    • DDD, cervical    • DEXA     = (-0.7/ -1.4)   • Extremity pain    • Fibromyalgia    • Hypertension    • Joint pain 2020    Neck and left arm hands   • Left shoulder pain    • Low back pain    • MAMMO     NEG = 2022   • Migraine headache    • Myalgia    • Neck pain    • Obesity    • Osteopenia    • PAP     ASCUS = / NEG =        Past Surgical History:   Procedure Laterality Date   •  SECTION     • COLONOSCOPY      COLOGUARD ---NEG = 2022, rech    • EPIDURAL BLOCK     • LYMPH NODE DISSECTION      cervical node= NEG   • TUBAL ABDOMINAL LIGATION         Family History   Problem Relation Age of Onset   • Heart disease Mother    • Osteoporosis Mother    • Hypertension Father    • Kidney disease Father         KIDNEY STONES   • Stroke  Father    • Heart disease Father    • No Known Problems Sister    • Arthritis Brother    • Diabetes Brother    • Heart disease Brother    • Hypertension Brother        Social History     Socioeconomic History   • Marital status:    Tobacco Use   • Smoking status: Every Day     Packs/day: 1.00     Years: 30.00     Pack years: 30.00     Types: Cigarettes   • Smokeless tobacco: Never   Vaping Use   • Vaping Use: Some days   • Substances: CBD, ecig   Substance and Sexual Activity   • Alcohol use: Yes     Comment: occ   • Drug use: No   • Sexual activity: Not Currently     Partners: Male     Birth control/protection: Surgical, Tubal ligation       History of Present Illness  58 y/o C female here for 3mos f/u on wt loss and Tob Abuse      Pt states the wt loss med in tablet form works a lot better than the capsule and noticed her appetite is down some; trying to walk more as well     Pt states she conts to smoke but wanting to quit and open to options today;  Pt feels her allergies are acting up too due to the dry weather and [place]       The following portions of the patient's history were reviewed and updated as appropriate: allergies, current medications, past family history, past medical history, past social history, past surgical history and problem list.    Review of Systems   Constitutional: Positive for activity change, appetite change and unexpected weight loss. Negative for fatigue and unexpected weight gain.   Respiratory: Positive for wheezing. Negative for shortness of breath.    Gastrointestinal: Positive for constipation. Negative for diarrhea, nausea, vomiting and GERD.   Allergic/Immunologic: Positive for environmental allergies.       Vitals:    10/21/22 1347   BP: 134/82   Pulse: 97   Temp: 97.5 °F (36.4 °C)   SpO2: 100%       Objective   Physical Exam  Vitals and nursing note reviewed.   Constitutional:       General: She is not in acute distress.     Appearance: She is  well-developed and overweight. She is not ill-appearing or toxic-appearing.   HENT:      Head: Normocephalic and atraumatic.   Cardiovascular:      Rate and Rhythm: Normal rate and regular rhythm.      Heart sounds: Normal heart sounds. No murmur heard.  Pulmonary:      Effort: Pulmonary effort is normal. No respiratory distress.      Breath sounds: No stridor. Examination of the right-upper field reveals decreased breath sounds. Examination of the left-upper field reveals decreased breath sounds. Examination of the right-middle field reveals decreased breath sounds. Examination of the left-middle field reveals decreased breath sounds. Examination of the right-lower field reveals decreased breath sounds. Examination of the left-lower field reveals decreased breath sounds. Decreased breath sounds present. No wheezing, rhonchi or rales.   Skin:     General: Skin is warm and dry.      Findings: No rash.   Neurological:      Mental Status: She is alert and oriented to person, place, and time.      Cranial Nerves: No cranial nerve deficit.   Psychiatric:         Mood and Affect: Mood normal.         Behavior: Behavior normal.         Thought Content: Thought content normal.         Judgment: Judgment normal.           Assessment & Plan   Diagnoses and all orders for this visit:    1. Class 2 severe obesity due to excess calories with serious comorbidity and body mass index (BMI) of 35.0 to 35.9 in adult (HCC) (Primary)  -     Vitamin D,25-Hydroxy; Future    2. Tobacco abuse counseling    3. Low vitamin D level  -     Vitamin D,25-Hydroxy; Future    4. Renal insufficiency  -     Comprehensive Metabolic Panel; Future    5. Osteopenia of hip, unspecified laterality  -     Vitamin D,25-Hydroxy; Future    6. Screening for hyperlipidemia  -     Lipid Panel; Future    Other orders  -     EPINEPHrine (EPIPEN) 0.3 MG/0.3ML solution auto-injector injection; Inject 0.3 mL under the skin into the appropriate area as directed As  Needed (allergic reaction).  Dispense: 1 each; Refill: 0  -     Cholecalciferol (Vitamin D) 50 MCG (2000 UT) tablet; Take 2,000 Units by mouth Daily.  -     buPROPion XL (Wellbutrin XL) 150 MG 24 hr tablet; Take 1 tablet by mouth Every Morning.  Dispense: 30 tablet; Refill: 1      Trial of Wellbutrin XL 150mg qam  Rx ---Epipen  Cont w/ phentermine tabs and healthy diet and exercise  Fasting labs soon  Disc'd vaccines for age

## 2022-11-02 NOTE — TELEPHONE ENCOUNTER
Rx Refill Note  Requested Prescriptions     Pending Prescriptions Disp Refills   • ibuprofen (ADVIL,MOTRIN) 800 MG tablet [Pharmacy Med Name: IBUPROFEN 800 MG TABLET] 90 tablet 0     Sig: TAKE ONE TABLET BY MOUTH EVERY 8 HOURS AS NEEDED FOR PAIN      Last office visit with prescribing clinician: 10/21/2022      Next office visit with prescribing clinician: Visit date not found     Basic Metabolic Panel (04/21/2022 14:08)  Lipid Panel (10/05/2021 11:26)         Akanksha Knapp CMA  11/02/22, 07:52 EDT

## 2022-11-03 ENCOUNTER — CLINICAL SUPPORT (OUTPATIENT)
Dept: FAMILY MEDICINE CLINIC | Facility: CLINIC | Age: 57
End: 2022-11-03

## 2022-11-03 DIAGNOSIS — M85.859 OSTEOPENIA OF HIP, UNSPECIFIED LATERALITY: ICD-10-CM

## 2022-11-03 DIAGNOSIS — E66.01 CLASS 2 SEVERE OBESITY DUE TO EXCESS CALORIES WITH SERIOUS COMORBIDITY AND BODY MASS INDEX (BMI) OF 35.0 TO 35.9 IN ADULT: ICD-10-CM

## 2022-11-03 DIAGNOSIS — N28.9 RENAL INSUFFICIENCY: ICD-10-CM

## 2022-11-03 DIAGNOSIS — R79.89 LOW VITAMIN D LEVEL: ICD-10-CM

## 2022-11-03 DIAGNOSIS — Z13.220 SCREENING FOR HYPERLIPIDEMIA: ICD-10-CM

## 2022-11-03 PROCEDURE — 36415 COLL VENOUS BLD VENIPUNCTURE: CPT | Performed by: FAMILY MEDICINE

## 2022-11-03 PROCEDURE — 80053 COMPREHEN METABOLIC PANEL: CPT | Performed by: FAMILY MEDICINE

## 2022-11-03 PROCEDURE — 82306 VITAMIN D 25 HYDROXY: CPT | Performed by: FAMILY MEDICINE

## 2022-11-03 PROCEDURE — 80061 LIPID PANEL: CPT | Performed by: FAMILY MEDICINE

## 2022-11-03 RX ORDER — IBUPROFEN 800 MG/1
TABLET ORAL
Qty: 90 TABLET | Refills: 0 | Status: SHIPPED | OUTPATIENT
Start: 2022-11-03 | End: 2022-12-07

## 2022-11-03 NOTE — PROGRESS NOTES
Venipuncture performed in L ARM by RT Katie  with good hemostasis. Patient tolerated well. 11/03/22 Torie Manjarrez, RT

## 2022-11-04 LAB
25(OH)D3 SERPL-MCNC: 95.9 NG/ML (ref 30–100)
ALBUMIN SERPL-MCNC: 4.3 G/DL (ref 3.5–5.2)
ALBUMIN/GLOB SERPL: 1.6 G/DL
ALP SERPL-CCNC: 94 U/L (ref 39–117)
ALT SERPL W P-5'-P-CCNC: 17 U/L (ref 1–33)
ANION GAP SERPL CALCULATED.3IONS-SCNC: 12 MMOL/L (ref 5–15)
AST SERPL-CCNC: 20 U/L (ref 1–32)
BILIRUB SERPL-MCNC: 0.3 MG/DL (ref 0–1.2)
BUN SERPL-MCNC: 25 MG/DL (ref 6–20)
BUN/CREAT SERPL: 20.8 (ref 7–25)
CALCIUM SPEC-SCNC: 9.8 MG/DL (ref 8.6–10.5)
CHLORIDE SERPL-SCNC: 105 MMOL/L (ref 98–107)
CHOLEST SERPL-MCNC: 186 MG/DL (ref 0–200)
CO2 SERPL-SCNC: 24 MMOL/L (ref 22–29)
CREAT SERPL-MCNC: 1.2 MG/DL (ref 0.57–1)
EGFRCR SERPLBLD CKD-EPI 2021: 52.6 ML/MIN/1.73
GLOBULIN UR ELPH-MCNC: 2.7 GM/DL
GLUCOSE SERPL-MCNC: 112 MG/DL (ref 65–99)
HDLC SERPL-MCNC: 61 MG/DL (ref 40–60)
LDLC SERPL CALC-MCNC: 112 MG/DL (ref 0–100)
LDLC/HDLC SERPL: 1.82 {RATIO}
POTASSIUM SERPL-SCNC: 4.2 MMOL/L (ref 3.5–5.2)
PROT SERPL-MCNC: 7 G/DL (ref 6–8.5)
SODIUM SERPL-SCNC: 141 MMOL/L (ref 136–145)
TRIGL SERPL-MCNC: 71 MG/DL (ref 0–150)
VLDLC SERPL-MCNC: 13 MG/DL (ref 5–40)

## 2022-11-04 RX ORDER — DAPAGLIFLOZIN 10 MG/1
1 TABLET, FILM COATED ORAL
Qty: 30 TABLET | Refills: 3 | Status: SHIPPED | OUTPATIENT
Start: 2022-11-04 | End: 2023-02-07

## 2022-11-11 RX ORDER — DILTIAZEM HYDROCHLORIDE 120 MG/1
CAPSULE, EXTENDED RELEASE ORAL
Qty: 90 CAPSULE | Refills: 1 | Status: SHIPPED | OUTPATIENT
Start: 2022-11-11

## 2022-11-11 NOTE — TELEPHONE ENCOUNTER
Rx Refill Note  Requested Prescriptions     Pending Prescriptions Disp Refills   • dilTIAZem XR (DILACOR XR) 120 MG 24 hr capsule [Pharmacy Med Name: DILTIAZEM 24H ER(XR) 120 MG CP] 90 capsule 1     Sig: TAKE ONE CAPSULE BY MOUTH ONCE NIGHTLY      Last office visit with prescribing clinician: 10/21/2022      Next office visit with prescribing clinician: Visit date not found     Comprehensive Metabolic Panel (11/03/2022 11:04)  Lipid Panel (11/03/2022 11:04)         Akanksha Mckeon CMA  11/11/22, 08:45 EST

## 2022-11-18 RX ORDER — BUPROPION HYDROCHLORIDE 150 MG/1
150 TABLET ORAL EVERY MORNING
Qty: 30 TABLET | Refills: 1 | Status: SHIPPED | OUTPATIENT
Start: 2022-11-18 | End: 2023-02-07 | Stop reason: SDUPTHER

## 2022-11-19 DIAGNOSIS — E66.01 CLASS 2 SEVERE OBESITY DUE TO EXCESS CALORIES WITH SERIOUS COMORBIDITY AND BODY MASS INDEX (BMI) OF 36.0 TO 36.9 IN ADULT: ICD-10-CM

## 2022-11-19 RX ORDER — PHENTERMINE HYDROCHLORIDE 37.5 MG/1
37.5 TABLET ORAL
Qty: 30 TABLET | Refills: 0 | Status: SHIPPED | OUTPATIENT
Start: 2022-11-19 | End: 2022-12-19

## 2022-12-01 NOTE — TELEPHONE ENCOUNTER
Rx Refill Note  Requested Prescriptions     Pending Prescriptions Disp Refills   • albuterol sulfate  (90 Base) MCG/ACT inhaler 18 g 0     Sig: Inhale 2 puffs Every 4 (Four) Hours As Needed for Wheezing.   • fluticasone (FLONASE) 50 MCG/ACT nasal spray 16 g 5     Si sprays by Each Nare route Daily.   • polyethylene glycol (GaviLAX) 17 GM/SCOOP powder 476 g 1     Sig: Take 17 g by mouth Daily. dissolve 17 grams in 8 ounces of liquid and drink once a day      Last office visit with prescribing clinician: 10/21/2022   Last telemedicine visit with prescribing clinician: Visit date not found   Next office visit with prescribing clinician: Visit date not found     Lipid Panel (2022 11:04)                      Would you like a call back once the refill request has been completed: [] Yes [] No    If the office needs to give you a call back, can they leave a voicemail: [] Yes [] No    Torie Manjarrez, RT  22, 11:11 EST

## 2022-12-02 RX ORDER — POLYETHYLENE GLYCOL 3350 17 G/17G
17 POWDER, FOR SOLUTION ORAL DAILY
Qty: 476 G | Refills: 1 | Status: SHIPPED | OUTPATIENT
Start: 2022-12-02 | End: 2023-01-21 | Stop reason: SDUPTHER

## 2022-12-02 RX ORDER — ALBUTEROL SULFATE 90 UG/1
2 AEROSOL, METERED RESPIRATORY (INHALATION) EVERY 4 HOURS PRN
Qty: 18 G | Refills: 0 | Status: SHIPPED | OUTPATIENT
Start: 2022-12-02

## 2022-12-02 RX ORDER — FLUTICASONE PROPIONATE 50 MCG
2 SPRAY, SUSPENSION (ML) NASAL DAILY
Qty: 16 G | Refills: 5 | Status: SHIPPED | OUTPATIENT
Start: 2022-12-02

## 2022-12-07 RX ORDER — IBUPROFEN 800 MG/1
TABLET ORAL
Qty: 90 TABLET | Refills: 0 | Status: SHIPPED | OUTPATIENT
Start: 2022-12-07

## 2022-12-07 NOTE — TELEPHONE ENCOUNTER
Rx Refill Note  Requested Prescriptions     Pending Prescriptions Disp Refills   • ibuprofen (ADVIL,MOTRIN) 800 MG tablet [Pharmacy Med Name: IBUPROFEN 800 MG TABLET] 90 tablet 0     Sig: TAKE ONE TABLET BY MOUTH EVERY 8 HOURS AS NEEDED FOR PAIN      Last office visit with prescribing clinician: 10/21/2022   Last telemedicine visit with prescribing clinician: Visit date not found   Next office visit with prescribing clinician: Visit date not found     Comprehensive Metabolic Panel (11/03/2022 11:04)  Lipid Panel (11/03/2022 11:04)                      Would you like a call back once the refill request has been completed: [] Yes [] No    If the office needs to give you a call back, can they leave a voicemail: [] Yes [] No    Akanksha Mckeon CMA  12/07/22, 08:57 EST

## 2022-12-18 DIAGNOSIS — E66.01 CLASS 2 SEVERE OBESITY DUE TO EXCESS CALORIES WITH SERIOUS COMORBIDITY AND BODY MASS INDEX (BMI) OF 36.0 TO 36.9 IN ADULT: ICD-10-CM

## 2022-12-19 RX ORDER — PHENTERMINE HYDROCHLORIDE 37.5 MG/1
TABLET ORAL
Qty: 30 TABLET | Refills: 0 | Status: SHIPPED | OUTPATIENT
Start: 2022-12-19 | End: 2023-01-23

## 2022-12-19 NOTE — TELEPHONE ENCOUNTER
Rx Refill Note  Requested Prescriptions     Pending Prescriptions Disp Refills   • phentermine (ADIPEX-P) 37.5 MG tablet [Pharmacy Med Name: PHENTERMINE 37.5 MG TABLET] 30 tablet      Sig: TAKE ONE TABLET BY MOUTH EVERY MORNING BEFORE BREAKFAST      Last office visit with prescribing clinician: 10/21/2022   Last telemedicine visit with prescribing clinician: Visit date not found   Next office visit with prescribing clinician: Visit date not found     Lipid Panel (11/03/2022 11:04)  Comprehensive Metabolic Panel (11/03/2022 11:04)                      Would you like a call back once the refill request has been completed: [] Yes [] No    If the office needs to give you a call back, can they leave a voicemail: [] Yes [] No    Akanksha Mckeon CMA  12/19/22, 10:25 EST

## 2023-01-04 ENCOUNTER — OFFICE VISIT (OUTPATIENT)
Dept: PAIN MEDICINE | Facility: CLINIC | Age: 58
End: 2023-01-04
Payer: MEDICARE

## 2023-01-04 VITALS
SYSTOLIC BLOOD PRESSURE: 147 MMHG | RESPIRATION RATE: 16 BRPM | OXYGEN SATURATION: 98 % | DIASTOLIC BLOOD PRESSURE: 81 MMHG | HEART RATE: 93 BPM

## 2023-01-04 DIAGNOSIS — G89.29 CHRONIC BILATERAL THORACIC BACK PAIN: ICD-10-CM

## 2023-01-04 DIAGNOSIS — M54.6 CHRONIC BILATERAL THORACIC BACK PAIN: ICD-10-CM

## 2023-01-04 DIAGNOSIS — M54.40 CHRONIC BILATERAL LOW BACK PAIN WITH SCIATICA, SCIATICA LATERALITY UNSPECIFIED: Primary | ICD-10-CM

## 2023-01-04 DIAGNOSIS — M79.10 MYALGIA: ICD-10-CM

## 2023-01-04 DIAGNOSIS — G89.4 CHRONIC PAIN DISORDER: ICD-10-CM

## 2023-01-04 DIAGNOSIS — M54.2 CERVICALGIA: ICD-10-CM

## 2023-01-04 DIAGNOSIS — M48.02 CERVICAL SPINAL STENOSIS: ICD-10-CM

## 2023-01-04 DIAGNOSIS — M54.12 CERVICAL RADICULOPATHY: ICD-10-CM

## 2023-01-04 DIAGNOSIS — M79.7 FIBROMYALGIA: ICD-10-CM

## 2023-01-04 DIAGNOSIS — G89.29 CHRONIC BILATERAL LOW BACK PAIN WITH SCIATICA, SCIATICA LATERALITY UNSPECIFIED: Primary | ICD-10-CM

## 2023-01-04 PROCEDURE — 99214 OFFICE O/P EST MOD 30 MIN: CPT | Performed by: PHYSICAL MEDICINE & REHABILITATION

## 2023-01-04 PROCEDURE — G0463 HOSPITAL OUTPT CLINIC VISIT: HCPCS | Performed by: PHYSICAL MEDICINE & REHABILITATION

## 2023-01-04 PROCEDURE — 1159F MED LIST DOCD IN RCRD: CPT | Performed by: PHYSICAL MEDICINE & REHABILITATION

## 2023-01-04 PROCEDURE — 1160F RVW MEDS BY RX/DR IN RCRD: CPT | Performed by: PHYSICAL MEDICINE & REHABILITATION

## 2023-01-04 PROCEDURE — 1125F AMNT PAIN NOTED PAIN PRSNT: CPT | Performed by: PHYSICAL MEDICINE & REHABILITATION

## 2023-01-04 RX ORDER — HYDROCODONE BITARTRATE AND ACETAMINOPHEN 10; 325 MG/1; MG/1
1 TABLET ORAL EVERY 8 HOURS PRN
Qty: 90 TABLET | Refills: 0 | Status: SHIPPED | OUTPATIENT
Start: 2023-01-04 | End: 2023-04-04 | Stop reason: SDUPTHER

## 2023-01-04 NOTE — PROGRESS NOTES
Subjective   Nicki Nielsen is a 57 y.o. female.     History of Present Illness  Neck and back pain, 10/10 at worst, 7/10 at best, always present, varies, aching, stabbing, worse with bending and lifting, interferes with ADLs, sleep, activity, failed PT, chiropractor, meds. MRI C-spine with DDD, stenosis. Saw Dr. Overton, had PT, given Norco 10mg TID prn, began receiving opioids from PCP, down to BID with less relief, PCP retiring, increased here to TID prn. Failed injections, surgery in past. Back pain worse after housework, no b/b incontinence, no new weakness or numbness. Worsening b/l thumb and great toe pain.       The following portions of the patient's history were reviewed and updated as appropriate: allergies, current medications, past family history, past medical history, past social history, past surgical history and problem list.    Review of Systems   Constitutional: Positive for fatigue. Negative for chills and fever.   HENT: Negative for hearing loss and trouble swallowing.    Eyes: Negative for visual disturbance.   Respiratory: Negative for shortness of breath.    Cardiovascular: Negative for chest pain.   Gastrointestinal: Positive for constipation and nausea. Negative for abdominal pain, diarrhea and vomiting.   Genitourinary: Negative for urinary incontinence.   Musculoskeletal: Positive for arthralgias, back pain, joint swelling and neck pain. Negative for myalgias.   Neurological: Positive for dizziness and headache. Negative for weakness and numbness.       Objective   Physical Exam   Constitutional: She is oriented to person, place, and time. She appears well-developed and well-nourished.   HENT:   Head: Normocephalic and atraumatic.   Eyes: Pupils are equal, round, and reactive to light.   Cardiovascular: Normal rate, regular rhythm and normal heart sounds.   Pulmonary/Chest: Breath sounds normal.   Abdominal: Soft. Bowel sounds are normal. She exhibits no distension. There is no  abdominal tenderness.   Neurological: She is alert and oriented to person, place, and time. She has normal reflexes. She displays normal reflexes. No sensory deficit.   Psychiatric: Her behavior is normal. Thought content normal.         Assessment & Plan   Diagnoses and all orders for this visit:    1. Chronic bilateral low back pain with sciatica, sciatica laterality unspecified (Primary)    2. Chronic bilateral thoracic back pain    3. Chronic pain disorder    4. Fibromyalgia    5. Myalgia    6. Cervical radiculopathy    7. Cervical spinal stenosis    8. Cervicalgia      Inspect reviewed, in order. UDS 7/20/21 with THC, has been around people who use marijuana, repeat UDS in order 8/26/22.  Treatment plan will consist of continuing current medication as long as it remains effective and is necessary, while evaluating patient at each visit and determining if the medication can be lowered or discontinued, while also using nonopioid therapies to reduce reliance on opioids.  Increased to Norco 10mg QID prn, then reduced to TID prn, then BID prn, back to TID prn, now keeping her awake. Rotated to Percocet 7.5mg TID prn, felt poorly and slept poorly. Rotate back to Norco 10mg TID prn.  Began Tizanidine 4mg TID prn, refill.  Began Lyrica 50mg BID, cannot tolerate Gabapentin.  Failed injections, surgery.  Insurance will not cover comp cream. Cont Voltaren.  Pt will ask PCP for referral to allergist for possible bee sting allergy.  RTC 3 months for f/u.

## 2023-01-20 DIAGNOSIS — E66.01 CLASS 2 SEVERE OBESITY DUE TO EXCESS CALORIES WITH SERIOUS COMORBIDITY AND BODY MASS INDEX (BMI) OF 36.0 TO 36.9 IN ADULT: ICD-10-CM

## 2023-01-20 RX ORDER — PHENTERMINE HYDROCHLORIDE 37.5 MG/1
37.5 TABLET ORAL
Qty: 30 TABLET | Refills: 0 | OUTPATIENT
Start: 2023-01-20

## 2023-01-20 RX ORDER — PHENTERMINE HYDROCHLORIDE 37.5 MG/1
TABLET ORAL
Qty: 30 TABLET | OUTPATIENT
Start: 2023-01-20

## 2023-01-21 ENCOUNTER — PATIENT MESSAGE (OUTPATIENT)
Dept: PAIN MEDICINE | Facility: CLINIC | Age: 58
End: 2023-01-21
Payer: MEDICARE

## 2023-01-21 DIAGNOSIS — E66.01 CLASS 2 SEVERE OBESITY DUE TO EXCESS CALORIES WITH SERIOUS COMORBIDITY AND BODY MASS INDEX (BMI) OF 36.0 TO 36.9 IN ADULT: ICD-10-CM

## 2023-01-23 RX ORDER — POLYETHYLENE GLYCOL 3350 17 G/17G
17 POWDER, FOR SOLUTION ORAL DAILY
Qty: 476 G | Refills: 1 | Status: SHIPPED | OUTPATIENT
Start: 2023-01-23 | End: 2023-03-27 | Stop reason: SDUPTHER

## 2023-01-23 RX ORDER — PHENTERMINE HYDROCHLORIDE 37.5 MG/1
TABLET ORAL
Qty: 30 TABLET | Refills: 0 | Status: SHIPPED | OUTPATIENT
Start: 2023-01-23 | End: 2023-02-19 | Stop reason: SDUPTHER

## 2023-01-23 NOTE — TELEPHONE ENCOUNTER
INSPECT RAN    Rx Refill Note  Requested Prescriptions     Pending Prescriptions Disp Refills   • phentermine (ADIPEX-P) 37.5 MG tablet [Pharmacy Med Name: PHENTERMINE 37.5 MG TABLET] 30 tablet      Sig: TAKE ONE TABLET BY MOUTH EVERY MORNING BEFORE BREAKFAST      Last office visit with prescribing clinician: 10/21/2022   Last telemedicine visit with prescribing clinician: 1/21/2023   Next office visit with prescribing clinician: 1/21/2023                       Lipid Panel (11/03/2022 11:04)    Would you like a call back once the refill request has been completed: [] Yes [] No    If the office needs to give you a call back, can they leave a voicemail: [] Yes [] No    Torie Manjarrez, RT  01/23/23, 16:08 EST

## 2023-01-23 NOTE — TELEPHONE ENCOUNTER
Rx Refill Note  Requested Prescriptions     Pending Prescriptions Disp Refills   • polyethylene glycol (GaviLAX) 17 GM/SCOOP powder 476 g 1     Sig: Take 17 g by mouth Daily. dissolve 17 grams in 8 ounces of liquid and drink once a day      Last office visit with prescribing clinician: 10/21/2022   Last telemedicine visit with prescribing clinician: 2/7/2023   Next office visit with prescribing clinician: 2/7/2023                       Lipid Panel (11/03/2022 11:04)    Would you like a call back once the refill request has been completed: [] Yes [] No    If the office needs to give you a call back, can they leave a voicemail: [] Yes [] No    Torie Manjarrez, RT  01/23/23, 16:09 EST

## 2023-01-23 NOTE — TELEPHONE ENCOUNTER
From: Nicki Nielsen  To: Mj Thibodeaux  Sent: 1/21/2023 9:51 AM EST  Subject: Could you please refill my Voltaren gel    Could you please refill my Voltaren gel. It won't let me send it in med request for some reason.

## 2023-01-27 RX ORDER — SPIRONOLACTONE 25 MG/1
TABLET ORAL
Qty: 90 TABLET | Refills: 1 | Status: SHIPPED | OUTPATIENT
Start: 2023-01-27

## 2023-02-07 ENCOUNTER — TELEPHONE (OUTPATIENT)
Dept: FAMILY MEDICINE CLINIC | Facility: CLINIC | Age: 58
End: 2023-02-07

## 2023-02-07 ENCOUNTER — TELEMEDICINE (OUTPATIENT)
Dept: FAMILY MEDICINE CLINIC | Facility: CLINIC | Age: 58
End: 2023-02-07
Payer: MEDICARE

## 2023-02-07 VITALS — WEIGHT: 227 LBS | BODY MASS INDEX: 34.52 KG/M2

## 2023-02-07 DIAGNOSIS — E66.01 CLASS 2 SEVERE OBESITY DUE TO EXCESS CALORIES WITH SERIOUS COMORBIDITY AND BODY MASS INDEX (BMI) OF 35.0 TO 35.9 IN ADULT: Primary | Chronic | ICD-10-CM

## 2023-02-07 DIAGNOSIS — R73.9 HYPERGLYCEMIA: ICD-10-CM

## 2023-02-07 DIAGNOSIS — Z71.6 TOBACCO ABUSE COUNSELING: ICD-10-CM

## 2023-02-07 DIAGNOSIS — N28.9 RENAL INSUFFICIENCY: ICD-10-CM

## 2023-02-07 PROCEDURE — 99213 OFFICE O/P EST LOW 20 MIN: CPT | Performed by: FAMILY MEDICINE

## 2023-02-07 RX ORDER — BUPROPION HYDROCHLORIDE 300 MG/1
300 TABLET ORAL EVERY MORNING
Qty: 30 TABLET | Refills: 2 | Status: SHIPPED | OUTPATIENT
Start: 2023-02-07

## 2023-02-07 RX ORDER — OLOPATADINE HYDROCHLORIDE 2 MG/ML
1 SOLUTION/ DROPS OPHTHALMIC DAILY
Qty: 1 EACH | Refills: 3 | Status: SHIPPED | OUTPATIENT
Start: 2023-02-07

## 2023-02-07 RX ORDER — TOPIRAMATE 50 MG/1
50 TABLET, FILM COATED ORAL DAILY
Qty: 90 TABLET | Refills: 1 | Status: SHIPPED | OUTPATIENT
Start: 2023-02-07

## 2023-02-07 RX ORDER — MONTELUKAST SODIUM 10 MG/1
10 TABLET ORAL DAILY
Qty: 90 TABLET | Refills: 2 | Status: SHIPPED | OUTPATIENT
Start: 2023-02-07

## 2023-02-07 NOTE — PROGRESS NOTES
Subjective   Nicki Nielsen is a 57 y.o. female.     Chief Complaint   Patient presents with   • Obesity   • Nicotine Dependence         Current Outpatient Medications:   •  buPROPion XL (Wellbutrin XL) 300 MG 24 hr tablet, Take 1 tablet by mouth Every Morning., Disp: 30 tablet, Rfl: 2  •  montelukast (SINGULAIR) 10 MG tablet, Take 1 tablet by mouth Daily., Disp: 90 tablet, Rfl: 2  •  olopatadine (PATADAY) 0.2 % solution ophthalmic solution, Administer 1 drop to both eyes Daily., Disp: 1 each, Rfl: 3  •  topiramate (TOPAMAX) 50 MG tablet, Take 1 tablet by mouth Daily., Disp: 90 tablet, Rfl: 1  •  albuterol (PROVENTIL) (2.5 MG/3ML) 0.083% nebulizer solution, Take 2.5 mg by nebulization Every 4 (Four) Hours As Needed for Wheezing., Disp: , Rfl:   •  albuterol sulfate  (90 Base) MCG/ACT inhaler, Inhale 2 puffs Every 4 (Four) Hours As Needed for Wheezing., Disp: 18 g, Rfl: 0  •  dextromethorphan-guaifenesin (MUCINEX DM MAX STRENGTH)  MG per 12 hr tablet, Take 1 tablet by mouth 2 (Two) Times a Day As Needed., Disp: , Rfl:   •  Diclofenac Sodium (VOLTAREN) 1 % gel gel, Apply 4 g topically to the appropriate area as directed 4 (Four) Times a Day As Needed (pain)., Disp: 500 g, Rfl: 11  •  dilTIAZem XR (DILACOR XR) 120 MG 24 hr capsule, TAKE ONE CAPSULE BY MOUTH ONCE NIGHTLY, Disp: 90 capsule, Rfl: 1  •  EPINEPHrine (EPIPEN) 0.3 MG/0.3ML solution auto-injector injection, Inject 0.3 mL under the skin into the appropriate area as directed As Needed (allergic reaction)., Disp: 1 each, Rfl: 0  •  fluticasone (FLONASE) 50 MCG/ACT nasal spray, 2 sprays by Each Nare route Daily., Disp: 16 g, Rfl: 5  •  HYDROcodone-acetaminophen (Norco)  MG per tablet, Take 1 tablet by mouth Every 8 (Eight) Hours As Needed for Moderate Pain., Disp: 90 tablet, Rfl: 0  •  HYDROcodone-acetaminophen (Norco)  MG per tablet, Take 1 tablet by mouth Every 8 (Eight) Hours As Needed for Moderate Pain., Disp: 90 tablet,  Rfl: 0  •  HYDROcodone-acetaminophen (Norco)  MG per tablet, Take 1 tablet by mouth Every 8 (Eight) Hours As Needed for Moderate Pain., Disp: 90 tablet, Rfl: 0  •  ibuprofen (ADVIL,MOTRIN) 800 MG tablet, TAKE ONE TABLET BY MOUTH EVERY 8 HOURS AS NEEDED FOR PAIN, Disp: 90 tablet, Rfl: 0  •  loratadine (CLARITIN) 10 MG tablet, Take 10 mg by mouth Daily., Disp: , Rfl:   •  phentermine (ADIPEX-P) 37.5 MG tablet, TAKE ONE TABLET BY MOUTH EVERY MORNING BEFORE BREAKFAST, Disp: 30 tablet, Rfl: 0  •  polyethylene glycol (GaviLAX) 17 GM/SCOOP powder, Take 17 g by mouth Daily. dissolve 17 grams in 8 ounces of liquid and drink once a day, Disp: 476 g, Rfl: 1  •  spironolactone (ALDACTONE) 25 MG tablet, TAKE ONE TABLET BY MOUTH DAILY, Disp: 90 tablet, Rfl: 1  •  tiZANidine (ZANAFLEX) 4 MG tablet, Take 1 tablet by mouth Every 8 (Eight) Hours As Needed for Muscle Spasms., Disp: 90 tablet, Rfl: 2    Past Medical History:   Diagnosis Date   • ADHD    • Allergic    • Asthma    • Cervical radiculopathy    • Chronic pain disorder 2020    Neck and left arm   • COPD    • DDD, cervical    • DEXA     = (-0.7/ -1.4)   • Extremity pain    • Fibromyalgia    • Hypertension    • Joint pain 2020    Neck and left arm hands   • Left shoulder pain    • Low back pain    • MAMMO     NEG = 2022   • Migraine headache    • Myalgia    • Neck pain    • Obesity    • Osteopenia    • PAP     ASCUS = / NEG =        Past Surgical History:   Procedure Laterality Date   •  SECTION     • COLONOSCOPY      COLOGUARD ---NEG = 2022, rech    • EPIDURAL BLOCK     • LYMPH NODE DISSECTION      cervical node= NEG   • TUBAL ABDOMINAL LIGATION         Family History   Problem Relation Age of Onset   • Heart disease Mother    • Osteoporosis Mother    • Hypertension Father    • Kidney disease Father         KIDNEY STONES   • Stroke Father    • Heart disease Father    • No Known Problems Sister    • Arthritis Brother     • Diabetes Brother    • Heart disease Brother    • Hypertension Brother        Social History     Socioeconomic History   • Marital status:    Tobacco Use   • Smoking status: Every Day     Packs/day: 1.00     Years: 30.00     Pack years: 30.00     Types: Cigarettes   • Smokeless tobacco: Never   Vaping Use   • Vaping Use: Some days   • Substances: CBD, ecig   Substance and Sexual Activity   • Alcohol use: Yes     Comment: occ   • Drug use: No   • Sexual activity: Not Currently     Partners: Male     Birth control/protection: Surgical, Tubal ligation       History of Present Illness  58 y/o C female on video from her home w/ me from my home for 3mos f/u on wt loss meds/ tob abuse      Pt states she tried the farxiga but felt bad after one dose (cant remember exactly what her symptoms were but they were bad) so she stopped this; Pt states she is familiar w/ a DM Diet and will work on diet and exercise until f/u labs    Pt states she is taking the wellbutrin XL 150mg and does feel it has helped her cut back some on her tob; pt states tried to send a msg to get the higher dose but then the original dose was refilled; pt admits she was smoking >1ppd and now down to about 1ppd    Pt doing fine on the topamax/ phentermine....still lost a few pound over the holidays       The following portions of the patient's history were reviewed and updated as appropriate: allergies, current medications, past family history, past medical history, past social history, past surgical history and problem list.    Review of Systems   Constitutional: Negative for activity change, appetite change, unexpected weight gain and unexpected weight loss.   Psychiatric/Behavioral: Negative for dysphoric mood, sleep disturbance, depressed mood and stress. The patient is not nervous/anxious.        There were no vitals filed for this visit.  BMI=34.52  Objective   Physical Exam  Constitutional:       General: She is not in acute distress.      Appearance: She is not ill-appearing.   Pulmonary:      Effort: Pulmonary effort is normal.   Neurological:      Mental Status: She is alert and oriented to person, place, and time.      Cranial Nerves: No cranial nerve deficit.   Psychiatric:         Mood and Affect: Mood normal.         Behavior: Behavior normal.         Thought Content: Thought content normal.         Judgment: Judgment normal.           Assessment & Plan   Diagnoses and all orders for this visit:    1. Class 2 severe obesity due to excess calories with serious comorbidity and body mass index (BMI) of 35.0 to 35.9 in adult ([facility]) (Primary)    2. Tobacco abuse counseling    3. Renal insufficiency  -     Basic Metabolic Panel; Future    4. Hyperglycemia  -     Hemoglobin A1c; Future    Other orders  -     buPROPion XL (Wellbutrin XL) 300 MG 24 hr tablet; Take 1 tablet by mouth Every Morning.  Dispense: 30 tablet; Refill: 2  -     montelukast (SINGULAIR) 10 MG tablet; Take 1 tablet by mouth Daily.  Dispense: 90 tablet; Refill: 2  -     olopatadine (PATADAY) 0.2 % solution ophthalmic solution; Administer 1 drop to both eyes Daily.  Dispense: 1 each; Refill: 3  -     topiramate (TOPAMAX) 50 MG tablet; Take 1 tablet by mouth Daily.  Dispense: 90 tablet; Refill: 1    Reviewed Nov fasting labs w/ pt and disc'd  Increase Wellbutrin to 300mg qday  Med refills  Encouraged pt to cont w/ low controlled carb diet and exercise  rech BMP/ A1C soon to monitor kidneys/ BS

## 2023-02-07 NOTE — TELEPHONE ENCOUNTER
Hub staff attempted to follow warm transfer process and was unsuccessful     Caller: Nicki Nielsen    Relationship to patient: Self    Best call back number: [phone]    Patient is needing: RETURNING MISSED CALL

## 2023-02-14 ENCOUNTER — CLINICAL SUPPORT (OUTPATIENT)
Dept: FAMILY MEDICINE CLINIC | Facility: CLINIC | Age: 58
End: 2023-02-14
Payer: MEDICARE

## 2023-02-14 DIAGNOSIS — N28.9 RENAL INSUFFICIENCY: ICD-10-CM

## 2023-02-14 DIAGNOSIS — R73.9 HYPERGLYCEMIA: ICD-10-CM

## 2023-02-14 LAB — HBA1C MFR BLD: 5.4 % (ref 3.5–5.6)

## 2023-02-14 PROCEDURE — 36415 COLL VENOUS BLD VENIPUNCTURE: CPT | Performed by: FAMILY MEDICINE

## 2023-02-14 PROCEDURE — 83036 HEMOGLOBIN GLYCOSYLATED A1C: CPT | Performed by: FAMILY MEDICINE

## 2023-02-14 PROCEDURE — 80048 BASIC METABOLIC PNL TOTAL CA: CPT | Performed by: FAMILY MEDICINE

## 2023-02-14 NOTE — PROGRESS NOTES
Venipuncture performed in L ARM by RT Katie  with good hemostasis. Patient tolerated well. 02/14/23 Torie Manjarrez, RT

## 2023-02-15 LAB
ANION GAP SERPL CALCULATED.3IONS-SCNC: 13 MMOL/L (ref 5–15)
BUN SERPL-MCNC: 32 MG/DL (ref 6–20)
BUN/CREAT SERPL: 21.6 (ref 7–25)
CALCIUM SPEC-SCNC: 10.2 MG/DL (ref 8.6–10.5)
CHLORIDE SERPL-SCNC: 105 MMOL/L (ref 98–107)
CO2 SERPL-SCNC: 22 MMOL/L (ref 22–29)
CREAT SERPL-MCNC: 1.48 MG/DL (ref 0.57–1)
EGFRCR SERPLBLD CKD-EPI 2021: 40.9 ML/MIN/1.73
GLUCOSE SERPL-MCNC: 114 MG/DL (ref 65–99)
POTASSIUM SERPL-SCNC: 4.2 MMOL/L (ref 3.5–5.2)
SODIUM SERPL-SCNC: 140 MMOL/L (ref 136–145)

## 2023-02-19 DIAGNOSIS — E66.01 CLASS 2 SEVERE OBESITY DUE TO EXCESS CALORIES WITH SERIOUS COMORBIDITY AND BODY MASS INDEX (BMI) OF 36.0 TO 36.9 IN ADULT: ICD-10-CM

## 2023-02-20 RX ORDER — BUPROPION HYDROCHLORIDE 150 MG/1
TABLET ORAL
Qty: 30 TABLET | Refills: 1 | OUTPATIENT
Start: 2023-02-20

## 2023-02-20 RX ORDER — PHENTERMINE HYDROCHLORIDE 37.5 MG/1
37.5 TABLET ORAL
Qty: 30 TABLET | Refills: 0 | Status: SHIPPED | OUTPATIENT
Start: 2023-02-20 | End: 2023-03-13

## 2023-02-20 RX ORDER — PHENTERMINE HYDROCHLORIDE 37.5 MG/1
TABLET ORAL
Qty: 30 TABLET | OUTPATIENT
Start: 2023-02-20

## 2023-02-20 NOTE — TELEPHONE ENCOUNTER
INSPECT RAN    Rx Refill Note  Requested Prescriptions     Pending Prescriptions Disp Refills   • phentermine (ADIPEX-P) 37.5 MG tablet 30 tablet 0     Sig: Take 1 tablet by mouth Every Morning Before Breakfast.      Last office visit with prescribing clinician: 10/21/2022   Last telemedicine visit with prescribing clinician: Visit date not found   Next office visit with prescribing clinician: Visit date not found                         Would you like a call back once the refill request has been completed: [] Yes [] No    If the office needs to give you a call back, can they leave a voicemail: [] Yes [] No    Torie Manjarrez, RT  02/20/23, 09:43 EST

## 2023-03-14 RX ORDER — NICOTINE 21 MG/24HR
1 PATCH, TRANSDERMAL 24 HOURS TRANSDERMAL EVERY 24 HOURS
Qty: 30 PATCH | Refills: 1 | Status: SHIPPED | OUTPATIENT
Start: 2023-03-14

## 2023-03-27 RX ORDER — POLYETHYLENE GLYCOL 3350 17 G/17G
17 POWDER, FOR SOLUTION ORAL DAILY
Qty: 476 G | Refills: 1 | Status: SHIPPED | OUTPATIENT
Start: 2023-03-27

## 2023-03-27 NOTE — TELEPHONE ENCOUNTER
Rx Refill Note  Requested Prescriptions     Pending Prescriptions Disp Refills   • polyethylene glycol (GaviLAX) 17 GM/SCOOP powder 476 g 1     Sig: Take 17 g by mouth Daily. dissolve 17 grams in 8 ounces of liquid and drink once a day      Last office visit with prescribing clinician: 10/21/2022   Last telemedicine visit with prescribing clinician: Visit date not found   Next office visit with prescribing clinician: Visit date not found                         Would you like a call back once the refill request has been completed: [] Yes [] No    If the office needs to give you a call back, can they leave a voicemail: [] Yes [] No    Torie Manjarrez, RT  03/27/23, 16:25 EDT

## 2023-04-04 ENCOUNTER — OFFICE VISIT (OUTPATIENT)
Dept: PAIN MEDICINE | Facility: CLINIC | Age: 58
End: 2023-04-04
Payer: MEDICARE

## 2023-04-04 VITALS
HEART RATE: 93 BPM | SYSTOLIC BLOOD PRESSURE: 139 MMHG | DIASTOLIC BLOOD PRESSURE: 71 MMHG | RESPIRATION RATE: 16 BRPM | OXYGEN SATURATION: 98 %

## 2023-04-04 DIAGNOSIS — G43.C0 PERIODIC HEADACHE SYNDROME, NOT INTRACTABLE: ICD-10-CM

## 2023-04-04 DIAGNOSIS — G89.29 CHRONIC BILATERAL LOW BACK PAIN WITH SCIATICA, SCIATICA LATERALITY UNSPECIFIED: Primary | ICD-10-CM

## 2023-04-04 DIAGNOSIS — M54.40 CHRONIC BILATERAL LOW BACK PAIN WITH SCIATICA, SCIATICA LATERALITY UNSPECIFIED: Primary | ICD-10-CM

## 2023-04-04 DIAGNOSIS — G89.29 CHRONIC BILATERAL THORACIC BACK PAIN: ICD-10-CM

## 2023-04-04 DIAGNOSIS — M79.10 MYALGIA: ICD-10-CM

## 2023-04-04 DIAGNOSIS — M48.02 CERVICAL SPINAL STENOSIS: ICD-10-CM

## 2023-04-04 DIAGNOSIS — M54.12 CERVICAL RADICULOPATHY: ICD-10-CM

## 2023-04-04 DIAGNOSIS — M79.7 FIBROMYALGIA: ICD-10-CM

## 2023-04-04 DIAGNOSIS — M54.2 CERVICALGIA: ICD-10-CM

## 2023-04-04 DIAGNOSIS — M54.6 CHRONIC BILATERAL THORACIC BACK PAIN: ICD-10-CM

## 2023-04-04 PROCEDURE — 99214 OFFICE O/P EST MOD 30 MIN: CPT | Performed by: PHYSICAL MEDICINE & REHABILITATION

## 2023-04-04 PROCEDURE — 1125F AMNT PAIN NOTED PAIN PRSNT: CPT | Performed by: PHYSICAL MEDICINE & REHABILITATION

## 2023-04-04 PROCEDURE — 3075F SYST BP GE 130 - 139MM HG: CPT | Performed by: PHYSICAL MEDICINE & REHABILITATION

## 2023-04-04 PROCEDURE — 3078F DIAST BP <80 MM HG: CPT | Performed by: PHYSICAL MEDICINE & REHABILITATION

## 2023-04-04 RX ORDER — MORPHINE SULFATE 15 MG/1
15 TABLET, FILM COATED, EXTENDED RELEASE ORAL NIGHTLY
Qty: 30 TABLET | Refills: 0 | Status: SHIPPED | OUTPATIENT
Start: 2023-04-04

## 2023-04-04 RX ORDER — HYDROCODONE BITARTRATE AND ACETAMINOPHEN 10; 325 MG/1; MG/1
1 TABLET ORAL 2 TIMES DAILY PRN
Qty: 60 TABLET | Refills: 0 | Status: SHIPPED | OUTPATIENT
Start: 2023-04-04

## 2023-04-04 NOTE — PROGRESS NOTES
Subjective   Nicki Nielsen is a 57 y.o. female.     History of Present Illness  Neck and back pain, 10/10 at worst, 7/10 at best, 6/10 today, always present, varies, aching, stabbing, worse with bending and lifting, interferes with ADLs, sleep, activity, failed PT, chiropractor, meds. MRI C-spine with DDD, stenosis. Saw Dr. Overton, had PT, given Norco 10mg TID prn, began receiving opioids from PCP, down to BID with less relief, PCP retiring, increased here to TID prn. Failed injections, surgery in past. Back pain worse after housework, no b/b incontinence, no new weakness or numbness. Worsening b/l thumb and great toe pain.       The following portions of the patient's history were reviewed and updated as appropriate: allergies, current medications, past family history, past medical history, past social history, past surgical history and problem list.    Review of Systems   Constitutional: Positive for fatigue. Negative for chills and fever.   HENT: Negative for hearing loss and trouble swallowing.    Eyes: Negative for visual disturbance.   Respiratory: Negative for shortness of breath.    Cardiovascular: Negative for chest pain.   Gastrointestinal: Positive for constipation and nausea. Negative for abdominal pain, diarrhea and vomiting.   Genitourinary: Negative for urinary incontinence.   Musculoskeletal: Positive for arthralgias, back pain, joint swelling and neck pain. Negative for myalgias.   Neurological: Positive for dizziness and headache. Negative for weakness and numbness.       Objective   Physical Exam   Constitutional: She is oriented to person, place, and time. She appears well-developed and well-nourished.   HENT:   Head: Normocephalic and atraumatic.   Eyes: Pupils are equal, round, and reactive to light.   Cardiovascular: Normal rate, regular rhythm and normal heart sounds.   Pulmonary/Chest: Breath sounds normal.   Abdominal: Soft. Bowel sounds are normal. She exhibits no distension.  There is no abdominal tenderness.   Neurological: She is alert and oriented to person, place, and time. She has normal reflexes. She displays normal reflexes. No sensory deficit.   Psychiatric: Her behavior is normal. Thought content normal.         Assessment & Plan   Diagnoses and all orders for this visit:    1. Chronic bilateral low back pain with sciatica, sciatica laterality unspecified (Primary)    2. Chronic bilateral thoracic back pain    3. Cervicalgia    4. Cervical radiculopathy    5. Cervical spinal stenosis    6. Fibromyalgia    7. Periodic headache syndrome, not intractable    8. Myalgia      Inspect reviewed, in order. UDS 7/20/21 with THC, has been around people who use marijuana, repeat UDS in order 8/26/22.  Treatment plan will consist of continuing current medication as long as it remains effective and is necessary, while evaluating patient at each visit and determining if the medication can be lowered or discontinued, while also using nonopioid therapies to reduce reliance on opioids.  Increased to Norco 10mg QID prn, then reduced to TID prn, then BID prn, back to TID prn, now keeping her awake. Rotated to Percocet 7.5mg TID prn, felt poorly and slept poorly. Rotated back to Norco 10mg TID prn. Begin Norco 10mg BD prn during the day, MS-Contin 15mg qHS.  Began Tizanidine 4mg TID prn, refill.  Began Lyrica 50mg BID, cannot tolerate Gabapentin.  Failed injections, surgery.  Insurance will not cover comp cream. Cont Voltaren.  Pt will ask PCP for referral to allergist for possible bee sting allergy.  RTC 3 months for f/u.

## 2023-04-10 RX ORDER — NICOTINE 21 MG/24HR
1 PATCH, TRANSDERMAL 24 HOURS TRANSDERMAL EVERY 24 HOURS
Qty: 30 PATCH | Refills: 1 | Status: SHIPPED | OUTPATIENT
Start: 2023-04-10

## 2023-04-10 NOTE — TELEPHONE ENCOUNTER
Rx Refill Note  Requested Prescriptions     Pending Prescriptions Disp Refills   • nicotine (Nicoderm CQ) 14 MG/24HR patch 30 patch 1     Sig: Place 1 patch on the skin as directed by provider Daily.      Last office visit with prescribing clinician: 10/21/2022   Last telemedicine visit with prescribing clinician: Visit date not found   Next office visit with prescribing clinician: Visit date not found                         Would you like a call back once the refill request has been completed: [] Yes [] No    If the office needs to give you a call back, can they leave a voicemail: [] Yes [] No    Akanksha Mckeon CMA  04/10/23, 17:42 EDT

## 2023-04-21 DIAGNOSIS — E66.01 CLASS 2 SEVERE OBESITY DUE TO EXCESS CALORIES WITH SERIOUS COMORBIDITY AND BODY MASS INDEX (BMI) OF 36.0 TO 36.9 IN ADULT: ICD-10-CM

## 2023-04-24 RX ORDER — PHENTERMINE HYDROCHLORIDE 37.5 MG/1
TABLET ORAL
Qty: 30 TABLET | OUTPATIENT
Start: 2023-04-24

## 2023-05-05 RX ORDER — BUPROPION HYDROCHLORIDE 300 MG/1
TABLET ORAL
Qty: 60 TABLET | Refills: 1 | Status: SHIPPED | OUTPATIENT
Start: 2023-05-05

## 2023-05-23 RX ORDER — POLYETHYLENE GLYCOL 3350 17 G/17G
POWDER ORAL
Qty: 510 G | Refills: 1 | Status: SHIPPED | OUTPATIENT
Start: 2023-05-23

## 2023-05-23 RX ORDER — DILTIAZEM HYDROCHLORIDE 120 MG/1
CAPSULE, EXTENDED RELEASE ORAL
Qty: 90 CAPSULE | Refills: 1 | Status: SHIPPED | OUTPATIENT
Start: 2023-05-23

## 2023-07-31 RX ORDER — SPIRONOLACTONE 25 MG/1
TABLET ORAL
Qty: 90 TABLET | Refills: 1 | Status: SHIPPED | OUTPATIENT
Start: 2023-07-31

## 2023-09-19 RX ORDER — POLYETHYLENE GLYCOL 3350 17 G/17G
POWDER, FOR SOLUTION ORAL
Qty: 510 G | Refills: 1 | Status: SHIPPED | OUTPATIENT
Start: 2023-09-19

## 2023-09-26 ENCOUNTER — OFFICE VISIT (OUTPATIENT)
Dept: PAIN MEDICINE | Facility: CLINIC | Age: 58
End: 2023-09-26
Payer: MEDICARE

## 2023-09-26 VITALS
RESPIRATION RATE: 16 BRPM | HEART RATE: 90 BPM | SYSTOLIC BLOOD PRESSURE: 140 MMHG | OXYGEN SATURATION: 97 % | DIASTOLIC BLOOD PRESSURE: 72 MMHG

## 2023-09-26 DIAGNOSIS — M79.10 MYALGIA: ICD-10-CM

## 2023-09-26 DIAGNOSIS — G89.4 CHRONIC PAIN DISORDER: ICD-10-CM

## 2023-09-26 DIAGNOSIS — M54.40 CHRONIC BILATERAL LOW BACK PAIN WITH SCIATICA, SCIATICA LATERALITY UNSPECIFIED: Primary | ICD-10-CM

## 2023-09-26 DIAGNOSIS — G89.29 CHRONIC BILATERAL THORACIC BACK PAIN: ICD-10-CM

## 2023-09-26 DIAGNOSIS — M54.2 CERVICALGIA: ICD-10-CM

## 2023-09-26 DIAGNOSIS — G89.29 CHRONIC BILATERAL LOW BACK PAIN WITH SCIATICA, SCIATICA LATERALITY UNSPECIFIED: Primary | ICD-10-CM

## 2023-09-26 DIAGNOSIS — M48.02 CERVICAL SPINAL STENOSIS: ICD-10-CM

## 2023-09-26 DIAGNOSIS — M54.6 CHRONIC BILATERAL THORACIC BACK PAIN: ICD-10-CM

## 2023-09-26 DIAGNOSIS — M54.12 CERVICAL RADICULOPATHY: ICD-10-CM

## 2023-09-26 DIAGNOSIS — M79.7 FIBROMYALGIA: ICD-10-CM

## 2023-09-26 PROCEDURE — 3077F SYST BP >= 140 MM HG: CPT | Performed by: PHYSICAL MEDICINE & REHABILITATION

## 2023-09-26 PROCEDURE — 1125F AMNT PAIN NOTED PAIN PRSNT: CPT | Performed by: PHYSICAL MEDICINE & REHABILITATION

## 2023-09-26 PROCEDURE — 3078F DIAST BP <80 MM HG: CPT | Performed by: PHYSICAL MEDICINE & REHABILITATION

## 2023-09-26 PROCEDURE — 1159F MED LIST DOCD IN RCRD: CPT | Performed by: PHYSICAL MEDICINE & REHABILITATION

## 2023-09-26 PROCEDURE — 1160F RVW MEDS BY RX/DR IN RCRD: CPT | Performed by: PHYSICAL MEDICINE & REHABILITATION

## 2023-09-26 PROCEDURE — 99213 OFFICE O/P EST LOW 20 MIN: CPT | Performed by: PHYSICAL MEDICINE & REHABILITATION

## 2023-09-26 RX ORDER — HYDROCODONE BITARTRATE AND ACETAMINOPHEN 10; 325 MG/1; MG/1
1 TABLET ORAL 2 TIMES DAILY PRN
Qty: 60 TABLET | Refills: 0 | Status: SHIPPED | OUTPATIENT
Start: 2023-09-26

## 2023-09-26 NOTE — PROGRESS NOTES
Subjective   Nicki Nielsen is a 57 y.o. female.     History of Present Illness  Neck and back pain, 10/10 at worst, 7/10 at best, 6/10 today, always present, varies, aching, stabbing, worse with bending and lifting, interferes with ADLs, sleep, activity, failed PT, chiropractor, meds. MRI C-spine with DDD, stenosis. Saw Dr. Overton, had PT, given Norco 10mg TID prn, began receiving opioids from PCP, down to BID with less relief, PCP retiring, increased here to TID prn. Failed injections, surgery in past. Back pain worse after housework, no b/b incontinence, no new weakness or numbness. Worsening b/l thumb and great toe pain.     The following portions of the patient's history were reviewed and updated as appropriate: allergies, current medications, past family history, past medical history, past social history, past surgical history and problem list.    Review of Systems   Constitutional:  Positive for fatigue. Negative for chills and fever.   HENT:  Negative for hearing loss and trouble swallowing.    Eyes:  Negative for visual disturbance.   Respiratory:  Negative for shortness of breath.    Cardiovascular:  Negative for chest pain.   Gastrointestinal:  Positive for constipation and nausea. Negative for abdominal pain, diarrhea and vomiting.   Genitourinary:  Negative for urinary incontinence.   Musculoskeletal:  Positive for arthralgias, back pain, joint swelling and neck pain. Negative for myalgias.   Neurological:  Positive for dizziness and headache. Negative for weakness and numbness.     Objective   Physical Exam   Constitutional: She is oriented to person, place, and time. She appears well-developed and well-nourished.   HENT:   Head: Normocephalic and atraumatic.   Eyes: Pupils are equal, round, and reactive to light.   Cardiovascular: Normal rate, regular rhythm and normal heart sounds.   Pulmonary/Chest: Breath sounds normal.   Abdominal: Soft. Bowel sounds are normal. She exhibits no  distension. There is no abdominal tenderness.   Neurological: She is alert and oriented to person, place, and time. She has normal reflexes. She displays normal reflexes. No sensory deficit.   Psychiatric: Her behavior is normal. Thought content normal.       Assessment & Plan   Diagnoses and all orders for this visit:    1. Chronic bilateral low back pain with sciatica, sciatica laterality unspecified (Primary)    2. Chronic bilateral thoracic back pain    3. Cervicalgia    4. Cervical spinal stenosis    5. Cervical radiculopathy    6. Chronic pain disorder    7. Fibromyalgia    8. Myalgia      Inspect reviewed, in order. UDS 7/20/21 with THC, has been around people who use marijuana, repeat UDS in order 8/26/22, 6/29/23.  Treatment plan will consist of continuing current medication as long as it remains effective and is necessary, while evaluating patient at each visit and determining if the medication can be lowered or discontinued, while also using nonopioid therapies to reduce reliance on opioids.  Increased to Norco 10mg QID prn, then reduced to TID prn, then BID prn, back to TID prn, now keeping her awake. Rotated to Percocet 7.5mg TID prn, felt poorly and slept poorly. Rotated back to Norco 10mg TID prn. Began Norco 10mg BD prn during the day, MS-Contin 15mg qHS but had breathing issues with morphine, stopped. Continue Norco 10mg BID prn. Filled 9/11/23.   Began Tizanidine 4mg TID prn, refill.  Began Lyrica 50mg BID, cannot tolerate Gabapentin.  Had bad reaction to Silenor 6mg qHS prn, stopped. May try OTC Melatonin.  Failed injections, surgery.  Insurance will not cover comp cream. Cont Voltaren.  Pt will ask PCP for referral to allergist for possible bee sting allergy.  RTC 3 months for f/u.

## 2023-11-08 RX ORDER — MONTELUKAST SODIUM 10 MG/1
10 TABLET ORAL DAILY
Qty: 90 TABLET | Refills: 2 | Status: SHIPPED | OUTPATIENT
Start: 2023-11-08

## 2023-11-13 RX ORDER — POLYETHYLENE GLYCOL 3350 17 G/17G
POWDER, FOR SOLUTION ORAL
Qty: 510 G | Refills: 1 | Status: SHIPPED | OUTPATIENT
Start: 2023-11-13

## 2023-11-14 ENCOUNTER — TELEPHONE (OUTPATIENT)
Dept: FAMILY MEDICINE CLINIC | Facility: CLINIC | Age: 58
End: 2023-11-14

## 2023-11-16 RX ORDER — DILTIAZEM HYDROCHLORIDE 120 MG/1
CAPSULE, EXTENDED RELEASE ORAL
Qty: 90 CAPSULE | Refills: 1 | OUTPATIENT
Start: 2023-11-16

## 2023-11-16 NOTE — TELEPHONE ENCOUNTER
Rx Refill Note  Requested Prescriptions     Pending Prescriptions Disp Refills    dilTIAZem XR (DILACOR XR) 120 MG 24 hr capsule [Pharmacy Med Name: dilTIAZem 24H ER(XR) 120 MG CP] 90 capsule 1     Sig: TAKE ONE CAPSULE BY MOUTH ONCE NIGHTLY      Last office visit with prescribing clinician: 10/21/2022   Last telemedicine visit with prescribing clinician: Visit date not found   Next office visit with prescribing clinician: Visit date not found        Lipid Panel (11/03/2022 11:04)                  Would you like a call back once the refill request has been completed: [] Yes [] No    If the office needs to give you a call back, can they leave a voicemail: [] Yes [] No    Torie Manjarrez, RT  11/16/23, 09:58 EST

## 2023-11-17 RX ORDER — DILTIAZEM HYDROCHLORIDE 120 MG/1
120 CAPSULE, EXTENDED RELEASE ORAL NIGHTLY
Qty: 30 CAPSULE | Refills: 0 | Status: SHIPPED | OUTPATIENT
Start: 2023-11-17

## 2023-11-28 ENCOUNTER — OFFICE VISIT (OUTPATIENT)
Dept: FAMILY MEDICINE CLINIC | Facility: CLINIC | Age: 58
End: 2023-11-28
Payer: MEDICARE

## 2023-11-28 VITALS
WEIGHT: 271 LBS | SYSTOLIC BLOOD PRESSURE: 121 MMHG | TEMPERATURE: 97.7 F | BODY MASS INDEX: 41.07 KG/M2 | OXYGEN SATURATION: 99 % | DIASTOLIC BLOOD PRESSURE: 75 MMHG | RESPIRATION RATE: 20 BRPM | HEART RATE: 73 BPM | HEIGHT: 68 IN

## 2023-11-28 DIAGNOSIS — E66.01 CLASS 2 SEVERE OBESITY DUE TO EXCESS CALORIES WITH SERIOUS COMORBIDITY AND BODY MASS INDEX (BMI) OF 36.0 TO 36.9 IN ADULT: Primary | ICD-10-CM

## 2023-11-28 DIAGNOSIS — Z82.49 FAMILY HISTORY OF ABDOMINAL AORTIC ANEURYSM (AAA): ICD-10-CM

## 2023-11-28 DIAGNOSIS — I10 PRIMARY HYPERTENSION: ICD-10-CM

## 2023-11-28 DIAGNOSIS — Z71.6 TOBACCO ABUSE COUNSELING: ICD-10-CM

## 2023-11-28 DIAGNOSIS — H92.02 OTALGIA OF LEFT EAR: ICD-10-CM

## 2023-11-28 PROCEDURE — 80053 COMPREHEN METABOLIC PANEL: CPT | Performed by: FAMILY MEDICINE

## 2023-11-28 RX ORDER — NICOTINE 21 MG/24HR
1 PATCH, TRANSDERMAL 24 HOURS TRANSDERMAL EVERY 24 HOURS
Qty: 28 PATCH | Refills: 1 | Status: SHIPPED | OUTPATIENT
Start: 2023-11-28

## 2023-11-28 RX ORDER — ALBUTEROL SULFATE 90 UG/1
2 AEROSOL, METERED RESPIRATORY (INHALATION) EVERY 4 HOURS PRN
Qty: 18 G | Refills: 0 | Status: SHIPPED | OUTPATIENT
Start: 2023-11-28

## 2023-11-28 RX ORDER — PHENTERMINE HYDROCHLORIDE 15 MG/1
15 CAPSULE ORAL EVERY MORNING
Qty: 30 CAPSULE | Refills: 0 | Status: SHIPPED | OUTPATIENT
Start: 2023-11-28

## 2023-11-28 RX ORDER — ALBUTEROL SULFATE 2.5 MG/3ML
2.5 SOLUTION RESPIRATORY (INHALATION) EVERY 4 HOURS PRN
Qty: 120 EACH | Refills: 0 | Status: SHIPPED | OUTPATIENT
Start: 2023-11-28 | End: 2023-12-01 | Stop reason: SDUPTHER

## 2023-11-28 RX ORDER — TOPIRAMATE 25 MG/1
25 TABLET ORAL 2 TIMES DAILY
Qty: 60 TABLET | Refills: 1 | Status: SHIPPED | OUTPATIENT
Start: 2023-11-28

## 2023-11-28 NOTE — PROGRESS NOTES
Venipuncture performed in L ARM by RT Katie  with good hemostasis. Patient tolerated well. 11/28/23 Torie Manjarrez, RT

## 2023-11-28 NOTE — PROGRESS NOTES
Subjective   Nicki Nielsen is a 58 y.o. female.     Chief Complaint   Patient presents with    Obesity     Patient would like to go back on the phentermine.     Nicotine Dependence   The patient is a 58-year-old female who presents for a follow-up on obesity and tob abuse    She started using patches to help her quit smoking. The patches worked well for the first 1.5 weeks, but now they do not seem to have any effect at all. She got a new prescription of the patches (14mg), but they do not seem to help. She is down to smoking about a pack a day. She has not been on Wellbutrin since 5/2023. Chantix gave her sleeping problems and an upset stomach problems.     She slept a lot when she was sick with COVID-19. She recovered from COVID-19 about 1.5 weeks ago. She was taking Topamax 50 mg once a day, but it made her a little fuzzy. She has gained a substantial amount of weight since she came off of it. She weighs 273 pounds today.    She periodically has sharp pain in her left ear. It will go away and it will not bother her for days. She wonders if she has an infection. She did get pus in her ear when she was sick with COVID-19.    Her cholesterol has been good for the last 5 years. She was not on medication. She has not been checking her sugars.    She wants to get an eval for poss aneurysm in her stomach. She has a family history of abdominal aneurysm. Her maternal grandmother had 1 leaked aneurysm, which was repaired. Her mother passed away from an abdominal aneurysm. She had a massive heart attack a few years before that. Her grandmother was a smoker.    She takes Zanaflex occasionally.      Current Outpatient Medications:     albuterol sulfate  (90 Base) MCG/ACT inhaler, Inhale 2 puffs Every 4 (Four) Hours As Needed for Wheezing., Disp: 18 g, Rfl: 0    buPROPion XL (WELLBUTRIN XL) 300 MG 24 hr tablet, TAKE ONE TABLET BY MOUTH EVERY MORNING, Disp: 60 tablet, Rfl: 1    dextromethorphan-guaifenesin  (MUCINEX DM MAX STRENGTH)  MG per 12 hr tablet, Take 1 tablet by mouth 2 (Two) Times a Day As Needed., Disp: , Rfl:     Diclofenac Sodium (VOLTAREN) 1 % gel gel, Apply 4 g topically to the appropriate area as directed 4 (Four) Times a Day As Needed (pain)., Disp: 500 g, Rfl: 11    dilTIAZem XR (DILACOR XR) 120 MG 24 hr capsule, Take 1 capsule by mouth Every Night., Disp: 30 capsule, Rfl: 0    EPINEPHrine (EPIPEN) 0.3 MG/0.3ML solution auto-injector injection, Inject 0.3 mL under the skin into the appropriate area as directed As Needed (allergic reaction)., Disp: 1 each, Rfl: 0    fluticasone (FLONASE) 50 MCG/ACT nasal spray, 2 sprays by Each Nare route Daily., Disp: 16 g, Rfl: 5    HYDROcodone-acetaminophen (Norco)  MG per tablet, Take 1 tablet by mouth 2 (Two) Times a Day As Needed for Moderate Pain., Disp: 60 tablet, Rfl: 0    ibuprofen (ADVIL,MOTRIN) 800 MG tablet, TAKE ONE TABLET BY MOUTH EVERY 8 HOURS AS NEEDED FOR PAIN, Disp: 90 tablet, Rfl: 0    loratadine (CLARITIN) 10 MG tablet, Take 1 tablet by mouth Daily., Disp: , Rfl:     montelukast (SINGULAIR) 10 MG tablet, TAKE ONE TABLET BY MOUTH DAILY, Disp: 90 tablet, Rfl: 2    olopatadine (PATADAY) 0.2 % solution ophthalmic solution, Administer 1 drop to both eyes Daily., Disp: 1 each, Rfl: 3    polyethylene glycol (MIRALAX) 17 GM/SCOOP powder, DISSOLVE 17 GRAMS IN 8 OUNCES OF LIQUID AND DRINK ONCE A DAY, Disp: 510 g, Rfl: 1    spironolactone (ALDACTONE) 25 MG tablet, TAKE ONE TABLET BY MOUTH DAILY, Disp: 90 tablet, Rfl: 1    tiZANidine (ZANAFLEX) 4 MG tablet, Take 1 tablet by mouth Every 8 (Eight) Hours As Needed for Muscle Spasms., Disp: 90 tablet, Rfl: 11    albuterol (PROVENTIL) (2.5 MG/3ML) 0.083% nebulizer solution, Take 2.5 mg by nebulization Every 4 (Four) Hours As Needed for Wheezing., Disp: 120 each, Rfl: 0    nicotine (Nicoderm CQ) 21 MG/24HR patch, Place 1 patch on the skin as directed by provider Daily., Disp: 28 patch, Rfl: 1     phentermine 15 MG capsule, Take 1 capsule by mouth Every Morning., Disp: 30 capsule, Rfl: 0    topiramate (Topamax) 25 MG tablet, Take 1 tablet by mouth 2 (Two) Times a Day., Disp: 60 tablet, Rfl: 1    Past Medical History:   Diagnosis Date    ADHD     Allergic     Asthma     Cervical radiculopathy     Chronic pain disorder 2020    Neck and left arm    COPD     DDD, cervical     DEXA     = (-0.7/ -1.4)    Extremity pain     Fibromyalgia     Hypertension     Joint pain 2020    Neck and left arm hands    Left shoulder pain     Low back pain     MAMMO     NEG = 2022    Migraine headache     Myalgia     Neck pain     Obesity     Osteopenia     PAP     ASCUS = / NEG =        Past Surgical History:   Procedure Laterality Date     SECTION      COLONOSCOPY      COLOGUARD ---NEG = 2022, rech     EPIDURAL BLOCK      LYMPH NODE DISSECTION      cervical node= NEG    TUBAL ABDOMINAL LIGATION         Family History   Problem Relation Age of Onset    Heart disease Mother     Osteoporosis Mother     Aortic aneurysm Mother     Hypertension Father     Kidney disease Father         KIDNEY STONES    Stroke Father     Heart disease Father     No Known Problems Sister     Arthritis Brother     Diabetes Brother     Heart disease Brother     Hypertension Brother     Heart disease Maternal Grandmother     Aortic aneurysm Maternal Grandmother        Social History     Socioeconomic History    Marital status:    Tobacco Use    Smoking status: Every Day     Packs/day: 1.00     Years: 30.00     Additional pack years: 0.00     Total pack years: 30.00     Types: Cigarettes     Passive exposure: Current    Smokeless tobacco: Never   Vaping Use    Vaping Use: Some days    Substances: CBD, ecig   Substance and Sexual Activity    Alcohol use: Yes     Comment: occ    Drug use: No    Sexual activity: Not Currently     Partners: Male     Birth control/protection: Surgical, Tubal ligation        Obesity  Pertinent negatives include no arthralgias, chest pain, coughing, fatigue, myalgias or weakness.        The following portions of the patient's history were reviewed and updated as appropriate: allergies, current medications, past family history, past medical history, past social history, past surgical history and problem list.    Review of Systems   Constitutional:  Positive for appetite change and unexpected weight gain. Negative for activity change, fatigue and unexpected weight loss.   HENT:  Positive for ear pain.    Respiratory:  Positive for shortness of breath. Negative for cough, chest tightness and wheezing.    Cardiovascular:  Negative for chest pain, palpitations and leg swelling.   Genitourinary:  Negative for frequency, urgency and urinary incontinence.   Musculoskeletal:  Negative for arthralgias and myalgias.   Neurological:  Negative for dizziness, facial asymmetry, speech difficulty, weakness, light-headedness, headache, memory problem and confusion.       Vitals:    11/28/23 1324   BP: 121/75   Pulse: 73   Resp: 20   Temp: 97.7 °F (36.5 °C)   SpO2: 99%       Objective   Physical Exam  Vitals and nursing note reviewed.   Constitutional:       General: She is not in acute distress.     Appearance: Normal appearance. She is obese. She is not ill-appearing or toxic-appearing.   HENT:      Head: Normocephalic and atraumatic.   Cardiovascular:      Rate and Rhythm: Normal rate and regular rhythm.      Heart sounds: No murmur heard.  Pulmonary:      Effort: Pulmonary effort is normal.      Breath sounds: Decreased air movement present. Decreased breath sounds present. No wheezing, rhonchi or rales.   Neurological:      Mental Status: She is alert and oriented to person, place, and time.      Cranial Nerves: No cranial nerve deficit.   Psychiatric:         Attention and Perception: Attention and perception normal.         Mood and Affect: Mood and affect normal.         Speech: Speech  normal.         Behavior: Behavior normal. Behavior is cooperative.         Thought Content: Thought content normal.         Cognition and Memory: Cognition and memory normal.         Judgment: Judgment normal.         Class 3 Severe Obesity (BMI >=40). Obesity-related health conditions include the following: hypertension and osteoarthritis. Obesity is worsening. BMI is is above average; BMI management plan is completed. We discussed portion control, increasing exercise, and pharmacologic options including topamax/ phentermine .      Assessment & Plan   Diagnoses and all orders for this visit:    1. Class 2 severe obesity due to excess calories with serious comorbidity and body mass index (BMI) of 36.0 to 36.9 in adult (Primary)  -     phentermine 15 MG capsule; Take 1 capsule by mouth Every Morning.  Dispense: 30 capsule; Refill: 0    2. Otalgia of left ear    3. Tobacco abuse counseling    4. Primary hypertension  -     Comprehensive Metabolic Panel    5. Family history of abdominal aortic aneurysm (AAA)  -     [place] AAA Screen Limited; Future  -     [place] AAA Screen Limited    Other orders  -     nicotine (Nicoderm CQ) 21 MG/24HR patch; Place 1 patch on the skin as directed by provider Daily.  Dispense: 28 patch; Refill: 1  -     topiramate (Topamax) 25 MG tablet; Take 1 tablet by mouth 2 (Two) Times a Day.  Dispense: 60 tablet; Refill: 1  -     Discontinue: albuterol (PROVENTIL) (2.5 MG/3ML) 0.083% nebulizer solution; Take 2.5 mg by nebulization Every 4 (Four) Hours As Needed for Wheezing.  Dispense: 120 each; Refill: 0  -     albuterol sulfate  (90 Base) MCG/ACT inhaler; Inhale 2 puffs Every 4 (Four) Hours As Needed for Wheezing.  Dispense: 18 g; Refill: 0    1. Left ear pain.  She was advised to try heat and warm oil.    2. Weight management.  We discussed about Wegovy and Topamax. I will start her on Topamax 25 mg twice a day. If nothing else, she can take it once a day.    3. Smoking cessation.  She was  advised to try Wellbutrin.    4. Family history of abdominal aortic aneurysm.  She has a family history of abdominal aortic aneurysm. I will order an ultrasound.    Transcribed from ambient dictation for Dea Mcnally DO by Saeed Callahan.  11/28/23   14:58 EST    Patient or patient representative verbalized consent to the visit recording.  I have personally performed the services described in this document as transcribed by the above individual, and it is both accurate and complete.

## 2023-11-29 LAB
ALBUMIN SERPL-MCNC: 4.2 G/DL (ref 3.5–5.2)
ALBUMIN/GLOB SERPL: 1.4 G/DL
ALP SERPL-CCNC: 105 U/L (ref 39–117)
ALT SERPL W P-5'-P-CCNC: 48 U/L (ref 1–33)
ANION GAP SERPL CALCULATED.3IONS-SCNC: 11.7 MMOL/L (ref 5–15)
AST SERPL-CCNC: 36 U/L (ref 1–32)
BILIRUB SERPL-MCNC: 0.3 MG/DL (ref 0–1.2)
BUN SERPL-MCNC: 23 MG/DL (ref 6–20)
BUN/CREAT SERPL: 20 (ref 7–25)
CALCIUM SPEC-SCNC: 10.2 MG/DL (ref 8.6–10.5)
CHLORIDE SERPL-SCNC: 102 MMOL/L (ref 98–107)
CO2 SERPL-SCNC: 26.3 MMOL/L (ref 22–29)
CREAT SERPL-MCNC: 1.15 MG/DL (ref 0.57–1)
EGFRCR SERPLBLD CKD-EPI 2021: 55 ML/MIN/1.73
GLOBULIN UR ELPH-MCNC: 3.1 GM/DL
GLUCOSE SERPL-MCNC: 85 MG/DL (ref 65–99)
POTASSIUM SERPL-SCNC: 4.6 MMOL/L (ref 3.5–5.2)
PROT SERPL-MCNC: 7.3 G/DL (ref 6–8.5)
SODIUM SERPL-SCNC: 140 MMOL/L (ref 136–145)

## 2023-12-01 ENCOUNTER — TELEPHONE (OUTPATIENT)
Dept: FAMILY MEDICINE CLINIC | Facility: CLINIC | Age: 58
End: 2023-12-01

## 2023-12-01 RX ORDER — ALBUTEROL SULFATE 2.5 MG/3ML
2.5 SOLUTION RESPIRATORY (INHALATION) EVERY 4 HOURS PRN
Qty: 120 EACH | Refills: 0 | Status: SHIPPED | OUTPATIENT
Start: 2023-12-01

## 2023-12-01 NOTE — TELEPHONE ENCOUNTER
Caller: [facility] [id number] - [place], [place] - [address] [place] [address] [address]  - [phone]  - [phone] FX    Relationship: Pharmacy    Best call back number: [phone]     Who are you requesting to speak with (clinical staff, provider,  specific staff member): CLINICAL     What was the call regarding: PHARMACY IS NEEDING THE DIAGNOSIS CODE FOR     albuterol (PROVENTIL) (2.5 MG/3ML) 0.083% nebulizer solution

## 2023-12-07 RX ORDER — TOPIRAMATE 50 MG/1
50 TABLET, FILM COATED ORAL DAILY
Qty: 90 TABLET | Refills: 1 | OUTPATIENT
Start: 2023-12-07

## 2023-12-13 ENCOUNTER — TELEPHONE (OUTPATIENT)
Dept: FAMILY MEDICINE CLINIC | Facility: CLINIC | Age: 58
End: 2023-12-13
Payer: MEDICARE

## 2023-12-24 DIAGNOSIS — E66.01 CLASS 2 SEVERE OBESITY DUE TO EXCESS CALORIES WITH SERIOUS COMORBIDITY AND BODY MASS INDEX (BMI) OF 36.0 TO 36.9 IN ADULT: ICD-10-CM

## 2023-12-26 NOTE — TELEPHONE ENCOUNTER
INSPECT RAN    Rx Refill Note  Requested Prescriptions     Pending Prescriptions Disp Refills    dilTIAZem XR (DILACOR XR) 120 MG 24 hr capsule [Pharmacy Med Name: dilTIAZem 24H ER(XR) 120 MG CP] 30 capsule 0     Sig: TAKE ONE CAPSULE BY MOUTH ONCE NIGHTLY    phentermine 15 MG capsule [Pharmacy Med Name: PHENTERMINE 15 MG CAPSULE] 30 capsule      Sig: TAKE ONE CAPSULE BY MOUTH EVERY MORNING      Last office visit with prescribing clinician: 11/28/2023   Last telemedicine visit with prescribing clinician: Visit date not found   Next office visit with prescribing clinician: 3/28/2024        Lipid Panel (11/03/2022 11:04)                  Would you like a call back once the refill request has been completed: [] Yes [] No    If the office needs to give you a call back, can they leave a voicemail: [] Yes [] No    Torei Manjarrez, RT  12/26/23, 14:36 EST

## 2023-12-27 RX ORDER — PHENTERMINE HYDROCHLORIDE 15 MG/1
15 CAPSULE ORAL EVERY MORNING
Qty: 30 CAPSULE | Refills: 0 | Status: SHIPPED | OUTPATIENT
Start: 2023-12-27

## 2023-12-27 RX ORDER — DILTIAZEM HYDROCHLORIDE 120 MG/1
120 CAPSULE, EXTENDED RELEASE ORAL NIGHTLY
Qty: 90 CAPSULE | Refills: 0 | Status: SHIPPED | OUTPATIENT
Start: 2023-12-27

## 2024-01-02 ENCOUNTER — OFFICE VISIT (OUTPATIENT)
Dept: PAIN MEDICINE | Facility: CLINIC | Age: 59
End: 2024-01-02
Payer: MEDICARE

## 2024-01-02 ENCOUNTER — TELEPHONE (OUTPATIENT)
Dept: PAIN MEDICINE | Facility: CLINIC | Age: 59
End: 2024-01-02
Payer: MEDICARE

## 2024-01-02 VITALS
WEIGHT: 263.5 LBS | RESPIRATION RATE: 16 BRPM | BODY MASS INDEX: 40.07 KG/M2 | OXYGEN SATURATION: 98 % | HEART RATE: 96 BPM | DIASTOLIC BLOOD PRESSURE: 71 MMHG | SYSTOLIC BLOOD PRESSURE: 138 MMHG

## 2024-01-02 DIAGNOSIS — G89.29 CHRONIC BILATERAL THORACIC BACK PAIN: ICD-10-CM

## 2024-01-02 DIAGNOSIS — M54.40 CHRONIC BILATERAL LOW BACK PAIN WITH SCIATICA, SCIATICA LATERALITY UNSPECIFIED: Primary | ICD-10-CM

## 2024-01-02 DIAGNOSIS — G89.29 CHRONIC BILATERAL LOW BACK PAIN WITH SCIATICA, SCIATICA LATERALITY UNSPECIFIED: Primary | ICD-10-CM

## 2024-01-02 DIAGNOSIS — G43.C0 PERIODIC HEADACHE SYNDROME, NOT INTRACTABLE: ICD-10-CM

## 2024-01-02 DIAGNOSIS — M54.6 CHRONIC BILATERAL THORACIC BACK PAIN: ICD-10-CM

## 2024-01-02 DIAGNOSIS — M54.2 CERVICALGIA: ICD-10-CM

## 2024-01-02 DIAGNOSIS — M79.10 MYALGIA: ICD-10-CM

## 2024-01-02 DIAGNOSIS — M79.7 FIBROMYALGIA: ICD-10-CM

## 2024-01-02 DIAGNOSIS — G89.4 CHRONIC PAIN DISORDER: ICD-10-CM

## 2024-01-02 DIAGNOSIS — M48.02 CERVICAL SPINAL STENOSIS: ICD-10-CM

## 2024-01-02 DIAGNOSIS — M54.12 CERVICAL RADICULOPATHY: ICD-10-CM

## 2024-01-02 PROCEDURE — 1125F AMNT PAIN NOTED PAIN PRSNT: CPT | Performed by: PHYSICAL MEDICINE & REHABILITATION

## 2024-01-02 PROCEDURE — 99213 OFFICE O/P EST LOW 20 MIN: CPT | Performed by: PHYSICAL MEDICINE & REHABILITATION

## 2024-01-02 PROCEDURE — 3075F SYST BP GE 130 - 139MM HG: CPT | Performed by: PHYSICAL MEDICINE & REHABILITATION

## 2024-01-02 PROCEDURE — 1160F RVW MEDS BY RX/DR IN RCRD: CPT | Performed by: PHYSICAL MEDICINE & REHABILITATION

## 2024-01-02 PROCEDURE — 3078F DIAST BP <80 MM HG: CPT | Performed by: PHYSICAL MEDICINE & REHABILITATION

## 2024-01-02 PROCEDURE — 1159F MED LIST DOCD IN RCRD: CPT | Performed by: PHYSICAL MEDICINE & REHABILITATION

## 2024-01-02 RX ORDER — HYDROCODONE BITARTRATE AND ACETAMINOPHEN 10; 325 MG/1; MG/1
1 TABLET ORAL 2 TIMES DAILY PRN
Qty: 60 TABLET | Refills: 0 | Status: SHIPPED | OUTPATIENT
Start: 2024-01-02

## 2024-01-02 RX ORDER — FLUTICASONE PROPIONATE 50 MCG
2 SPRAY, SUSPENSION (ML) NASAL DAILY
Qty: 16 ML | Refills: 0 | Status: SHIPPED | OUTPATIENT
Start: 2024-01-02

## 2024-01-02 NOTE — PROGRESS NOTES
Subjective   Nicki Nielsen is a 58 y.o. female.     History of Present Illness  Neck and back pain, 10/10 at worst, 7/10 at best, 6/10 today, always present, varies, aching, stabbing, worse with bending and lifting, interferes with ADLs, sleep, activity, failed PT, chiropractor, meds. MRI C-spine with DDD, stenosis. Saw Dr. Overton, had PT, given Norco 10mg TID prn, began receiving opioids from PCP, down to BID with less relief, PCP retiring, increased here to TID prn. Failed injections, surgery in past. Back pain worse after housework, no b/b incontinence, no new weakness or numbness. Worsening b/l thumb and great toe pain.  Back Pain  Pertinent negatives include no abdominal pain, bladder incontinence, chest pain, fever, numbness or weakness.        The following portions of the patient's history were reviewed and updated as appropriate: allergies, current medications, past family history, past medical history, past social history, past surgical history and problem list.    Review of Systems   Constitutional:  Positive for fatigue. Negative for chills and fever.   HENT:  Negative for hearing loss and trouble swallowing.    Eyes:  Negative for visual disturbance.   Respiratory:  Negative for shortness of breath.    Cardiovascular:  Negative for chest pain.   Gastrointestinal:  Positive for constipation and nausea. Negative for abdominal pain, diarrhea and vomiting.   Genitourinary:  Negative for urinary incontinence.   Musculoskeletal:  Positive for arthralgias, back pain, joint swelling and neck pain. Negative for myalgias.   Neurological:  Positive for dizziness and headache. Negative for weakness and numbness.       Objective   Physical Exam   Constitutional: She is oriented to person, place, and time. She appears well-developed and well-nourished.   HENT:   Head: Normocephalic and atraumatic.   Eyes: Pupils are equal, round, and reactive to light.   Cardiovascular: Normal rate, regular rhythm and  normal heart sounds.   Pulmonary/Chest: Breath sounds normal.   Abdominal: Soft. Bowel sounds are normal. She exhibits no distension. There is no abdominal tenderness.   Neurological: She is alert and oriented to person, place, and time. She has normal reflexes. She displays normal reflexes. No sensory deficit.   Psychiatric: Her behavior is normal. Thought content normal.         Assessment & Plan   Diagnoses and all orders for this visit:    1. Chronic bilateral low back pain with sciatica, sciatica laterality unspecified (Primary)    2. Chronic bilateral thoracic back pain    3. Chronic pain disorder    4. Fibromyalgia    5. Periodic headache syndrome, not intractable    6. Myalgia    7. Cervical radiculopathy    8. Cervical spinal stenosis    9. Cervicalgia      Inspect reviewed, in order. UDS 7/20/21 with THC, has been around people who use marijuana, repeat UDS in order 8/26/22, 6/29/23.  Treatment plan will consist of continuing current medication as long as it remains effective and is necessary, while evaluating patient at each visit and determining if the medication can be lowered or discontinued, while also using nonopioid therapies to reduce reliance on opioids.  Increased to Norco 10mg QID prn, then reduced to TID prn, then BID prn, back to TID prn, now keeping her awake. Rotated to Percocet 7.5mg TID prn, felt poorly and slept poorly. Rotated back to Norco 10mg TID prn. Began Norco 10mg BD prn during the day, MS-Contin 15mg qHS but had breathing issues with morphine, stopped. Continue Norco 10mg BID prn. Filled 11/13/23.   Began Tizanidine 4mg TID prn, refill.  Began Lyrica 50mg BID, cannot tolerate Gabapentin.  Had bad reaction to Silenor 6mg qHS prn, stopped. May try OTC Melatonin.  Failed injections, surgery.  Insurance will not cover comp cream. Cont Voltaren.  Pt will ask PCP for referral to allergist for possible bee sting allergy.  RTC 3 months for f/u.

## 2024-01-02 NOTE — TELEPHONE ENCOUNTER
Rx Refill Note  Requested Prescriptions     Pending Prescriptions Disp Refills    fluticasone (FLONASE) 50 MCG/ACT nasal spray [Pharmacy Med Name: FLUTICASONE PROP 50 MCG SPRAY] 16 mL      Sig: SPRAY TWO SPRAYS IN EACH NOSTRIL ONCE DAILY      Last office visit with prescribing clinician: 11/28/2023   Last telemedicine visit with prescribing clinician: Visit date not found   Next office visit with prescribing clinician: 3/28/2024                     Lipid Panel (11/03/2022 11:04)     Would you like a call back once the refill request has been completed: [] Yes [] No    If the office needs to give you a call back, can they leave a voicemail: [] Yes [] No    Torie Manjarrez, RT  01/02/24, 09:06 EST

## 2024-01-02 NOTE — TELEPHONE ENCOUNTER
Hub staff attempted to follow warm transfer process and was unsuccessful     Caller: Nicki Nielsen    Relationship to patient: Self    Best call back number: [phone] (home)       Patient is needing: SENDING FOR HEADS UP. PATIENT HAD FLAT TIRE WAS RUNNING LATE APPT WAS AT 1320 WENT TO  PATIENT SEEN APPT FOR 1410 PATIENT STATES SHE IS 10-12 MINS AWAY. TYPICALLY WHEN I'VE WT THIS HAS BEEN OK SINCE PATIENT ALREADY HAD AN APPT FOR TODAY JUST RUNNING LATER THAN THE 15MIN [place] PERIOD AND IF THERE IS AN AVAILABLE LATER TIME SAME DAY OF ALREADY BOOKED APPT GO AHEAD. I WENT BASED OFF PREVIOUS CALLS HOPE THIS WAS AN OKAY DECISION IF NOT MY APOLOGIES IN ADVANCE.

## 2024-01-10 RX ORDER — POLYETHYLENE GLYCOL 3350 17 G/17G
POWDER, FOR SOLUTION ORAL
Qty: 510 G | Refills: 1 | Status: SHIPPED | OUTPATIENT
Start: 2024-01-10

## 2024-01-26 RX ORDER — TOPIRAMATE 25 MG/1
25 TABLET ORAL 2 TIMES DAILY
Qty: 60 TABLET | Refills: 1 | Status: SHIPPED | OUTPATIENT
Start: 2024-01-26

## 2024-02-03 NOTE — TELEPHONE ENCOUNTER
Rx Refill Note  Requested Prescriptions     Pending Prescriptions Disp Refills   • ibuprofen (ADVIL,MOTRIN) 800 MG tablet [Pharmacy Med Name: IBUPROFEN 800 MG TABLET] 90 tablet 0     Sig: TAKE ONE TABLET BY MOUTH EVERY 8 HOURS AS NEEDED FOR PAIN      Last office visit with prescribing clinician: 1/20/2022      Next office visit with prescribing clinician: 4/21/2022     Basic Metabolic Panel (01/12/2022 13:29)  Lipid Panel (10/05/2021 11:26)         Akanksha Knapp CMA  04/11/22, 09:24 EDT   Yes

## 2024-02-05 DIAGNOSIS — E66.01 CLASS 2 SEVERE OBESITY DUE TO EXCESS CALORIES WITH SERIOUS COMORBIDITY AND BODY MASS INDEX (BMI) OF 36.0 TO 36.9 IN ADULT: ICD-10-CM

## 2024-02-05 RX ORDER — PHENTERMINE HYDROCHLORIDE 15 MG/1
15 CAPSULE ORAL EVERY MORNING
Qty: 30 CAPSULE | Refills: 0 | Status: SHIPPED | OUTPATIENT
Start: 2024-02-05

## 2024-02-05 NOTE — TELEPHONE ENCOUNTER
INSPECT RAN    Rx Refill Note  Requested Prescriptions     Pending Prescriptions Disp Refills    phentermine 15 MG capsule [Pharmacy Med Name: PHENTERMINE 15 MG CAPSULE] 30 capsule      Sig: TAKE 1 CAPSULE BY MOUTH EVERY MORNING      Last office visit with prescribing clinician: 11/28/2023   Last telemedicine visit with prescribing clinician: Visit date not found   Next office visit with prescribing clinician: 3/28/2024        Lipid Panel (11/03/2022 11:04)                  Would you like a call back once the refill request has been completed: [] Yes [] No    If the office needs to give you a call back, can they leave a voicemail: [] Yes [] No    Torie Manjarrez, RT  02/05/24, 12:54 EST

## 2024-02-09 RX ORDER — SPIRONOLACTONE 25 MG/1
25 TABLET ORAL DAILY
Qty: 90 TABLET | Refills: 1 | Status: SHIPPED | OUTPATIENT
Start: 2024-02-09

## 2024-03-03 DIAGNOSIS — E66.01 CLASS 2 SEVERE OBESITY DUE TO EXCESS CALORIES WITH SERIOUS COMORBIDITY AND BODY MASS INDEX (BMI) OF 36.0 TO 36.9 IN ADULT: ICD-10-CM

## 2024-03-04 RX ORDER — PHENTERMINE HYDROCHLORIDE 15 MG/1
15 CAPSULE ORAL EVERY MORNING
Qty: 30 CAPSULE | Refills: 0 | Status: SHIPPED | OUTPATIENT
Start: 2024-03-04

## 2024-03-04 NOTE — TELEPHONE ENCOUNTER
Rx Refill Note  Requested Prescriptions     Pending Prescriptions Disp Refills    phentermine 15 MG capsule [Pharmacy Med Name: PHENTERMINE 15 MG CAPSULE] 30 capsule      Sig: TAKE 1 CAPSULE BY MOUTH EVERY MORNING      Last office visit with prescribing clinician: 11/28/2023   Last telemedicine visit with prescribing clinician: Visit date not found   Next office visit with prescribing clinician: 3/28/2024     Comprehensive Metabolic Panel (11/28/2023 14:09)   Hemoglobin A1c (02/14/2023 13:39)   Lipid Panel (11/03/2022 11:04)                     Would you like a call back once the refill request has been completed: [] Yes [] No    If the office needs to give you a call back, can they leave a voicemail: [] Yes [] No    Akanksha Mckeon, [facility]  03/04/24, 11:54 EST    INSPECT in chart

## 2024-03-11 RX ORDER — POLYETHYLENE GLYCOL 3350 17 G/17G
POWDER, FOR SOLUTION ORAL
Qty: 510 G | Refills: 1 | Status: SHIPPED | OUTPATIENT
Start: 2024-03-11

## 2024-03-11 NOTE — TELEPHONE ENCOUNTER
Rx Refill Note  Requested Prescriptions     Pending Prescriptions Disp Refills    polyethylene glycol (MIRALAX) 17 GM/SCOOP powder [Pharmacy Med Name: POLYETHYLENE GLYCOL 3350 POWD] 510 g 1     Sig: DISSOLVE 17 GRAMS IN 8 OUNCES OF LIQUID AND DRINK ONCE A DAY      Last office visit with prescribing clinician: 11/28/2023   Last telemedicine visit with prescribing clinician: Visit date not found   Next office visit with prescribing clinician: 3/28/2024        Lipid Panel (11/03/2022 11:04)                  Would you like a call back once the refill request has been completed: [] Yes [] No    If the office needs to give you a call back, can they leave a voicemail: [] Yes [] No    Torie Manjarrez, RT  03/11/24, 10:59 EDT

## 2024-03-25 RX ORDER — DILTIAZEM HYDROCHLORIDE 120 MG/1
120 CAPSULE, EXTENDED RELEASE ORAL NIGHTLY
Qty: 90 CAPSULE | Refills: 0 | Status: SHIPPED | OUTPATIENT
Start: 2024-03-25

## 2024-03-25 NOTE — TELEPHONE ENCOUNTER
Rx Refill Note  Requested Prescriptions     Pending Prescriptions Disp Refills    dilTIAZem XR (DILACOR XR) 120 MG 24 hr capsule [Pharmacy Med Name: dilTIAZem 24H ER(XR) 120 MG CP] 90 capsule 0     Sig: TAKE ONE CAPSULE BY MOUTH ONCE NIGHTLY      Last office visit with prescribing clinician: 11/28/2023   Last telemedicine visit with prescribing clinician: Visit date not found   Next office visit with prescribing clinician: 3/28/2024     Comprehensive Metabolic Panel (11/28/2023 14:09)   Hemoglobin A1c (02/14/2023 13:39)   Basic Metabolic Panel (02/14/2023 13:39)                     Would you like a call back once the refill request has been completed: [] Yes [] No    If the office needs to give you a call back, can they leave a voicemail: [] Yes [] No    Akanksha Mckeon CMA  03/25/24, 10:45 EDT

## 2024-03-28 ENCOUNTER — PATIENT ROUNDING (BHMG ONLY) (OUTPATIENT)
Dept: FAMILY MEDICINE CLINIC | Facility: CLINIC | Age: 59
End: 2024-03-28

## 2024-03-28 ENCOUNTER — OFFICE VISIT (OUTPATIENT)
Dept: FAMILY MEDICINE CLINIC | Facility: CLINIC | Age: 59
End: 2024-03-28
Payer: MEDICARE

## 2024-03-28 VITALS
WEIGHT: 256 LBS | DIASTOLIC BLOOD PRESSURE: 87 MMHG | BODY MASS INDEX: 38.8 KG/M2 | SYSTOLIC BLOOD PRESSURE: 127 MMHG | TEMPERATURE: 97.8 F | HEIGHT: 68 IN | OXYGEN SATURATION: 98 % | RESPIRATION RATE: 18 BRPM | HEART RATE: 95 BPM

## 2024-03-28 DIAGNOSIS — E67.3 HYPERVITAMINOSIS D: ICD-10-CM

## 2024-03-28 DIAGNOSIS — Z12.31 SCREENING MAMMOGRAM FOR BREAST CANCER: ICD-10-CM

## 2024-03-28 DIAGNOSIS — Z00.00 MEDICARE ANNUAL WELLNESS VISIT, SUBSEQUENT: Primary | ICD-10-CM

## 2024-03-28 DIAGNOSIS — H91.93 BILATERAL HEARING LOSS, UNSPECIFIED HEARING LOSS TYPE: ICD-10-CM

## 2024-03-28 DIAGNOSIS — Z78.0 POSTMENOPAUSAL: ICD-10-CM

## 2024-03-28 DIAGNOSIS — E66.01 CLASS 2 SEVERE OBESITY DUE TO EXCESS CALORIES WITH SERIOUS COMORBIDITY AND BODY MASS INDEX (BMI) OF 36.0 TO 36.9 IN ADULT: ICD-10-CM

## 2024-03-28 DIAGNOSIS — I10 PRIMARY HYPERTENSION: ICD-10-CM

## 2024-03-28 DIAGNOSIS — Z13.220 SCREENING FOR HYPERLIPIDEMIA: ICD-10-CM

## 2024-03-28 RX ORDER — PHENTERMINE HYDROCHLORIDE 15 MG/1
15 CAPSULE ORAL EVERY MORNING
Qty: 30 CAPSULE | Refills: 0 | Status: SHIPPED | OUTPATIENT
Start: 2024-03-28

## 2024-03-28 RX ORDER — TOPIRAMATE 25 MG/1
25 TABLET ORAL DAILY
Qty: 90 TABLET | Refills: 1 | Status: SHIPPED | OUTPATIENT
Start: 2024-03-28

## 2024-03-28 RX ORDER — EPINEPHRINE 0.3 MG/.3ML
0.3 INJECTION SUBCUTANEOUS AS NEEDED
Qty: 1 EACH | Refills: 0 | Status: SHIPPED | OUTPATIENT
Start: 2024-03-28

## 2024-03-28 RX ORDER — IBUPROFEN 800 MG/1
800 TABLET ORAL EVERY 8 HOURS PRN
Qty: 90 TABLET | Refills: 0 | Status: SHIPPED | OUTPATIENT
Start: 2024-03-28

## 2024-03-28 RX ORDER — RIMEGEPANT SULFATE 75 MG/75MG
1 TABLET, ORALLY DISINTEGRATING ORAL DAILY PRN
Qty: 2 TABLET | Refills: 0 | COMMUNITY
Start: 2024-03-28

## 2024-03-28 NOTE — PROGRESS NOTES
The ABCs of the Annual Wellness Visit  Subsequent Medicare Wellness Visit    Chief Complaint   Patient presents with    Medicare Wellness-subsequent       Subjective   History of Present Illness:  Nicki Nielsen is a 58 y.o. female who presents for a Subsequent Medicare Wellness Visit.  Pt states she has a Migraine and took 800mg IB already w/o success  Pt states she is dealing w/ her mom's estate and it is taking a toll  HEALTH RISK ASSESSMENT    Recent Hospitalizations:  No hospitalization(s) within the last year.    Current Medical Providers:  Patient Care Team:  Dea Mcnally DO as PCP - General (Family Medicine)  Mj Thibodeaux MD as Consulting Physician (Physical Medicine and Rehabilitation)    Smoking Status:  Social History     Tobacco Use   Smoking Status Every Day    Current packs/day: 1.00    Average packs/day: 1 pack/day for 30.0 years (30.0 ttl pk-yrs)    Types: Cigarettes    Passive exposure: Current   Smokeless Tobacco Never       Alcohol Consumption:  Social History     Substance and Sexual Activity   Alcohol Use Yes    Comment: occ       Depression Screen:       3/28/2024     1:11 PM   PHQ-2/PHQ-9 Depression Screening   Little Interest or Pleasure in Doing Things 0-->not at all   Feeling Down, Depressed or Hopeless 1-->several days   PHQ-9: Brief Depression Severity Measure Score 1       Fall Risk Screen:  LJADI Fall Risk Assessment was completed, and patient is at LOW risk for falls.Assessment completed on:3/28/2024    Health Habits and Functional and Cognitive Screening:      3/28/2024     1:00 PM   Functional & Cognitive Status   Do you have difficulty preparing food and eating? No   Do you have difficulty bathing yourself, getting dressed or grooming yourself? No   Do you have difficulty using the toilet? No   Do you have difficulty moving around from place to place? No   Do you have trouble with steps or getting out of a bed or a chair? Yes   Current Diet Well Balanced Diet    Dental Exam Not up to date   Eye Exam Up to date   Exercise (times per week) 3 times per week   Current Exercises Include Walking   Do you need help using the phone?  No   Are you deaf or do you have serious difficulty hearing?  No   Do you need help to go to places out of walking distance? No   Do you need help shopping? No   Do you need help preparing meals?  No   Do you need help with housework?  Yes   Do you need help with laundry? No   Do you need help taking your medications? No   Do you need help managing money? No   Do you ever drive or ride in a car without wearing a seat belt? No   Have you felt unusual stress, anger or loneliness in the last month? Yes   Who do you live with? Alone   If you need help, do you have trouble finding someone available to you? No   Have you been bothered in the last four weeks by sexual problems? No         Does the patient have evidence of cognitive impairment? No    Asprin use counseling:Does not need ASA (and currently is not on it)    Age-appropriate Screening Schedule:  Refer to the list below for future screening recommendations based on patient's age, sex and/or medical conditions. Orders for these recommended tests are listed in the plan section. The patient has been provided with a written plan.    Health Maintenance   Topic Date Due    Pneumococcal Vaccine 0-64 (1 of 2 - PCV) Never done    TDAP/TD VACCINES (1 - Tdap) Never done    ZOSTER VACCINE (1 of 2) Never done    LUNG CANCER SCREENING  Never done    HEPATITIS C SCREENING  Never done    ANNUAL WELLNESS VISIT  Never done    INFLUENZA VACCINE  08/01/2023    COVID-19 Vaccine (3 - 2023-24 season) 09/01/2023    PAP SMEAR  10/27/2023    LIPID PANEL  11/03/2023    MAMMOGRAM  01/31/2024    DXA SCAN  01/31/2024    BMI FOLLOWUP  03/04/2025    COLORECTAL CANCER SCREENING  01/01/2029          The following portions of the patient's history were reviewed and updated as appropriate: allergies, current medications, past family  history, past medical history, past social history, past surgical history, and problem list.    Outpatient Medications Prior to Visit   Medication Sig Dispense Refill    albuterol (PROVENTIL) (2.5 MG/3ML) 0.083% nebulizer solution Take 2.5 mg by nebulization Every 4 (Four) Hours As Needed for Wheezing. 120 each 0    albuterol sulfate  (90 Base) MCG/ACT inhaler Inhale 2 puffs Every 4 (Four) Hours As Needed for Wheezing. 18 g 0    dextromethorphan-guaifenesin (MUCINEX DM MAX STRENGTH)  MG per 12 hr tablet Take 1 tablet by mouth 2 (Two) Times a Day As Needed.      Diclofenac Sodium (VOLTAREN) 1 % gel gel Apply 4 g topically to the appropriate area as directed 4 (Four) Times a Day As Needed (pain). 500 g 11    dilTIAZem XR (DILACOR XR) 120 MG 24 hr capsule TAKE ONE CAPSULE BY MOUTH ONCE NIGHTLY 90 capsule 0    fluticasone (FLONASE) 50 MCG/ACT nasal spray SPRAY TWO SPRAYS IN EACH NOSTRIL ONCE DAILY 16 mL 0    HYDROcodone-acetaminophen (Norco)  MG per tablet Take 1 tablet by mouth 2 (Two) Times a Day As Needed for Moderate Pain. 60 tablet 0    loratadine (CLARITIN) 10 MG tablet Take 1 tablet by mouth Daily.      montelukast (SINGULAIR) 10 MG tablet TAKE ONE TABLET BY MOUTH DAILY 90 tablet 2    olopatadine (PATADAY) 0.2 % solution ophthalmic solution Administer 1 drop to both eyes Daily. 1 each 3    polyethylene glycol (MIRALAX) 17 GM/SCOOP powder DISSOLVE 17 GRAMS IN 8 OUNCES OF LIQUID AND DRINK ONCE A  g 1    spironolactone (ALDACTONE) 25 MG tablet TAKE 1 TABLET BY MOUTH DAILY 90 tablet 1    tiZANidine (ZANAFLEX) 4 MG tablet Take 1 tablet by mouth Every 8 (Eight) Hours As Needed for Muscle Spasms. 90 tablet 11    buPROPion XL (WELLBUTRIN XL) 300 MG 24 hr tablet TAKE ONE TABLET BY MOUTH EVERY MORNING 60 tablet 1    EPINEPHrine (EPIPEN) 0.3 MG/0.3ML solution auto-injector injection Inject 0.3 mL under the skin into the appropriate area as directed As Needed (allergic reaction). 1 each 0     "HYDROcodone-acetaminophen (Norco)  MG per tablet Take 1 tablet by mouth 2 (Two) Times a Day As Needed for Moderate Pain. 60 tablet 0    ibuprofen (ADVIL,MOTRIN) 800 MG tablet TAKE ONE TABLET BY MOUTH EVERY 8 HOURS AS NEEDED FOR PAIN 90 tablet 0    phentermine 15 MG capsule TAKE 1 CAPSULE BY MOUTH EVERY MORNING 30 capsule 0    topiramate (TOPAMAX) 25 MG tablet TAKE 1 TABLET BY MOUTH TWICE A DAY (Patient taking differently: Take 1 tablet by mouth Daily.) 60 tablet 1    nicotine (Nicoderm CQ) 21 MG/24HR patch Place 1 patch on the skin as directed by provider Daily. (Patient not taking: Reported on 3/28/2024) 28 patch 1     No facility-administered medications prior to visit.       Patient Active Problem List   Diagnosis    Abnormal C-reactive protein    COPD (chronic obstructive pulmonary disease)    Cervical radiculopathy    Chronic anxiety    Malaise and fatigue    Arthralgia    Myalgia    Chronic pain disorder    Fibromyalgia    Obesity    Tobacco abuse counseling    Chronic bilateral thoracic back pain    Chronic bilateral low back pain with sciatica    Cervical spinal stenosis    Hypertension    Migraine headache    Cervicalgia       Advanced Care Planning:  ACP discussion was held with the patient during this visit. Patient does not have an advance directive, declines further assistance.    Review of Systems    Compared to one year ago, the patient feels her physical health is worse.  Compared to one year ago, the patient feels her mental health is the same.    Reviewed chart for potential of high risk medication in the elderly: yes  Reviewed chart for potential of harmful drug interactions in the elderly:yes    Objective         Vitals:    03/28/24 1303   BP: 127/87   BP Location: Right arm   Patient Position: Sitting   Cuff Size: Large Adult   Pulse: 95   Resp: 18   Temp: 97.8 °F (36.6 °C)   TempSrc: Infrared   SpO2: 98%   Weight: 116 kg (256 lb)   Height: 172.7 cm (68\")       Body mass index is 38.92 " kg/m².  Discussed the patient's BMI with her. The BMI is in the acceptable range.    Physical Exam  Vitals and nursing note reviewed.   Constitutional:       General: She is not in acute distress.     Appearance: She is not ill-appearing or toxic-appearing.   HENT:      Head: Normocephalic and atraumatic.   Pulmonary:      Effort: Pulmonary effort is normal.   Neurological:      Mental Status: She is alert and oriented to person, place, and time.      Cranial Nerves: No cranial nerve deficit.   Psychiatric:         Mood and Affect: Mood normal.         Behavior: Behavior normal.         Thought Content: Thought content normal.         Judgment: Judgment normal.             Assessment & Plan   Medicare Risks and Personalized Health Plan  CMS Preventative Services Quick Reference  Advance Directive Discussion  Breast Cancer/Mammogram Screening  Chronic Pain   Colon Cancer Screening  Dementia/Memory   Depression/Dysphoria  Diabetic Lab Screening   Fall Risk  Glaucoma Risk  Hearing Problem  Immunizations Discussed/Encouraged (specific immunizations; Influenza, Pneumococcal 23, Prevnar 20 (Pneumococcal 20-valent conjugate), Vaxneuvance (Pneumococcal 15-valent conjugate), Shingrix, COVID19, and RSV (Respiratory Syncytial Virus) )  Inadequate Social Support, Isolation, Loneliness, Lack of Transportation, Financial Difficulties, or Caregiver Stress   Inactivity/Sedentary  Lung Cancer Risk  Obesity/Overweight   Osteoporosis Risk    The above risks/problems have been discussed with the patient.  Pertinent information has been shared with the patient in the After Visit Summary.  Follow up plans and orders are seen below in the Assessment/Plan Section.    Diagnoses and all orders for this visit:    1. Medicare annual wellness visit, subsequent (Primary)    2. Primary hypertension  -     Comprehensive Metabolic Panel; Future    3. Bilateral hearing loss, unspecified hearing loss type  -     Ambulatory Referral to ENT  (Otolaryngology)    4. Hypervitaminosis D  -     Vitamin D,25-Hydroxy; Future    5. Class 2 severe obesity due to excess calories with serious comorbidity and body mass index (BMI) of 36.0 to 36.9 in adult  -     phentermine 15 MG capsule; Take 1 capsule by mouth Every Morning.  Dispense: 30 capsule; Refill: 0    6. Postmenopausal  -     DEXA Bone Density Axial; Future    7. Screening for hyperlipidemia  -     Lipid Panel; Future    8. Screening mammogram for breast cancer  -     Mammo Screening Digital Tomosynthesis Bilateral With CAD; Future    Other orders  -     Rimegepant Sulfate (Nurtec) 75 MG tablet dispersible tablet; Take 1 tablet by mouth Daily As Needed (Migraine).  Dispense: 2 tablet; Refill: 0  -     EPINEPHrine (EPIPEN) 0.3 MG/0.3ML solution auto-injector injection; Inject 0.3 mL under the skin into the appropriate area as directed As Needed (allergic reaction).  Dispense: 1 each; Refill: 0  -     ibuprofen (ADVIL,MOTRIN) 800 MG tablet; Take 1 tablet by mouth Every 8 (Eight) Hours As Needed for Moderate Pain or Headache.  Dispense: 90 tablet; Refill: 0  -     topiramate (TOPAMAX) 25 MG tablet; Take 1 tablet by mouth Daily.  Dispense: 90 tablet; Refill: 1      Follow Up:  No follow-ups on file.     An After Visit Summary and PPPS were given to the patient.     [facility] and had HA relief

## 2024-04-02 ENCOUNTER — OFFICE VISIT (OUTPATIENT)
Dept: PAIN MEDICINE | Facility: CLINIC | Age: 59
End: 2024-04-02
Payer: MEDICARE

## 2024-04-02 ENCOUNTER — CLINICAL SUPPORT (OUTPATIENT)
Dept: FAMILY MEDICINE CLINIC | Facility: CLINIC | Age: 59
End: 2024-04-02
Payer: MEDICARE

## 2024-04-02 VITALS
DIASTOLIC BLOOD PRESSURE: 78 MMHG | WEIGHT: 253.5 LBS | OXYGEN SATURATION: 97 % | SYSTOLIC BLOOD PRESSURE: 153 MMHG | HEART RATE: 92 BPM | BODY MASS INDEX: 38.54 KG/M2 | RESPIRATION RATE: 16 BRPM

## 2024-04-02 DIAGNOSIS — M48.02 CERVICAL SPINAL STENOSIS: ICD-10-CM

## 2024-04-02 DIAGNOSIS — G43.C0 PERIODIC HEADACHE SYNDROME, NOT INTRACTABLE: ICD-10-CM

## 2024-04-02 DIAGNOSIS — M54.6 CHRONIC BILATERAL THORACIC BACK PAIN: ICD-10-CM

## 2024-04-02 DIAGNOSIS — G89.4 CHRONIC PAIN DISORDER: ICD-10-CM

## 2024-04-02 DIAGNOSIS — E67.3 HYPERVITAMINOSIS D: ICD-10-CM

## 2024-04-02 DIAGNOSIS — Z79.899 HIGH RISK MEDICATION USE: Primary | ICD-10-CM

## 2024-04-02 DIAGNOSIS — Z13.220 SCREENING FOR HYPERLIPIDEMIA: ICD-10-CM

## 2024-04-02 DIAGNOSIS — M54.2 CERVICALGIA: ICD-10-CM

## 2024-04-02 DIAGNOSIS — M79.7 FIBROMYALGIA: ICD-10-CM

## 2024-04-02 DIAGNOSIS — M79.10 MYALGIA: ICD-10-CM

## 2024-04-02 DIAGNOSIS — M54.40 CHRONIC BILATERAL LOW BACK PAIN WITH SCIATICA, SCIATICA LATERALITY UNSPECIFIED: ICD-10-CM

## 2024-04-02 DIAGNOSIS — G89.29 CHRONIC BILATERAL LOW BACK PAIN WITH SCIATICA, SCIATICA LATERALITY UNSPECIFIED: ICD-10-CM

## 2024-04-02 DIAGNOSIS — G89.29 CHRONIC BILATERAL THORACIC BACK PAIN: ICD-10-CM

## 2024-04-02 DIAGNOSIS — I10 PRIMARY HYPERTENSION: ICD-10-CM

## 2024-04-02 PROCEDURE — 3077F SYST BP >= 140 MM HG: CPT | Performed by: PHYSICAL MEDICINE & REHABILITATION

## 2024-04-02 PROCEDURE — 1160F RVW MEDS BY RX/DR IN RCRD: CPT | Performed by: PHYSICAL MEDICINE & REHABILITATION

## 2024-04-02 PROCEDURE — 99213 OFFICE O/P EST LOW 20 MIN: CPT | Performed by: PHYSICAL MEDICINE & REHABILITATION

## 2024-04-02 PROCEDURE — 82306 VITAMIN D 25 HYDROXY: CPT | Performed by: FAMILY MEDICINE

## 2024-04-02 PROCEDURE — 1125F AMNT PAIN NOTED PAIN PRSNT: CPT | Performed by: PHYSICAL MEDICINE & REHABILITATION

## 2024-04-02 PROCEDURE — 80061 LIPID PANEL: CPT | Performed by: FAMILY MEDICINE

## 2024-04-02 PROCEDURE — 1159F MED LIST DOCD IN RCRD: CPT | Performed by: PHYSICAL MEDICINE & REHABILITATION

## 2024-04-02 PROCEDURE — 36415 COLL VENOUS BLD VENIPUNCTURE: CPT | Performed by: FAMILY MEDICINE

## 2024-04-02 PROCEDURE — 80053 COMPREHEN METABOLIC PANEL: CPT | Performed by: FAMILY MEDICINE

## 2024-04-02 PROCEDURE — 3078F DIAST BP <80 MM HG: CPT | Performed by: PHYSICAL MEDICINE & REHABILITATION

## 2024-04-02 RX ORDER — HYDROCODONE BITARTRATE AND ACETAMINOPHEN 10; 325 MG/1; MG/1
1 TABLET ORAL 2 TIMES DAILY PRN
Qty: 60 TABLET | Refills: 0 | Status: SHIPPED | OUTPATIENT
Start: 2024-04-02

## 2024-04-02 NOTE — PROGRESS NOTES
Venipuncture performed in L ARM by RT Katie  with good hemostasis. Patient tolerated well. 04/02/24 Torie Manjarrez, RT

## 2024-04-02 NOTE — PROGRESS NOTES
Subjective   Nicki Nielsen is a 58 y.o. female.     History of Present Illness  Neck and back pain, 10/10 at worst, 7/10 at best, 6/10 today, always present, varies, aching, stabbing, worse with bending and lifting, interferes with ADLs, sleep, activity, failed PT, chiropractor, meds. MRI C-spine with DDD, stenosis. Saw Dr. Overton, had PT, given Norco 10mg TID prn, began receiving opioids from PCP, down to BID with less relief, PCP retiring, increased here to TID prn. Failed injections, surgery in past. Back pain worse after housework, no b/b incontinence, no new weakness or numbness. Worsening b/l thumb and great toe pain.  Back Pain  Pertinent negatives include no abdominal pain, bladder incontinence, chest pain, fever, numbness or weakness.        The following portions of the patient's history were reviewed and updated as appropriate: allergies, current medications, past family history, past medical history, past social history, past surgical history and problem list.    Review of Systems   Constitutional:  Positive for fatigue. Negative for chills and fever.   HENT:  Negative for hearing loss and trouble swallowing.    Eyes:  Negative for visual disturbance.   Respiratory:  Negative for shortness of breath.    Cardiovascular:  Negative for chest pain.   Gastrointestinal:  Positive for constipation and nausea. Negative for abdominal pain, diarrhea and vomiting.   Genitourinary:  Negative for urinary incontinence.   Musculoskeletal:  Positive for arthralgias, back pain, joint swelling and neck pain. Negative for myalgias.   Neurological:  Positive for dizziness and headache. Negative for weakness and numbness.       Objective   Physical Exam   Constitutional: She is oriented to person, place, and time. She appears well-developed and well-nourished.   HENT:   Head: Normocephalic and atraumatic.   Eyes: Pupils are equal, round, and reactive to light.   Cardiovascular: Normal rate, regular rhythm and  normal heart sounds.   Pulmonary/Chest: Breath sounds normal.   Abdominal: Soft. Bowel sounds are normal. She exhibits no distension. There is no abdominal tenderness.   Neurological: She is alert and oriented to person, place, and time. She has normal reflexes. She displays normal reflexes. No sensory deficit.   Psychiatric: Her behavior is normal. Thought content normal.         Assessment & Plan   Diagnoses and all orders for this visit:    1. Chronic bilateral low back pain with sciatica, sciatica laterality unspecified (Primary)    2. Chronic bilateral thoracic back pain    3. Chronic pain disorder    4. Fibromyalgia    5. Periodic headache syndrome, not intractable    6. Myalgia    7. Cervicalgia    8. Cervical spinal stenosis      Inspect reviewed, in order. UDS 7/20/21 with THC, has been around people who use marijuana, repeat UDS in order 8/26/22, 6/29/23.  Treatment plan will consist of continuing current medication as long as it remains effective and is necessary, while evaluating patient at each visit and determining if the medication can be lowered or discontinued, while also using nonopioid therapies to reduce reliance on opioids.  Increased to Norco 10mg QID prn, then reduced to TID prn, then BID prn, back to TID prn, now keeping her awake. Rotated to Percocet 7.5mg TID prn, felt poorly and slept poorly. Rotated back to Norco 10mg TID prn. Began Norco 10mg BD prn during the day, MS-Contin 15mg qHS but had breathing issues with morphine, stopped. Continue Norco 10mg BID prn. Filled 3/9/24.   Began Tizanidine 4mg TID prn, refill.  Began Lyrica 50mg BID, cannot tolerate Gabapentin.  Had bad reaction to Silenor 6mg qHS prn, stopped. May try OTC Melatonin.  Failed injections, surgery.  Insurance will not cover comp cream. Cont Voltaren.  Pt will ask PCP for referral to allergist for possible bee sting allergy.  RTC 3 months for f/u.

## 2024-04-03 DIAGNOSIS — N28.9 RENAL INSUFFICIENCY: Primary | ICD-10-CM

## 2024-04-03 LAB
25(OH)D3 SERPL-MCNC: 55.3 NG/ML (ref 30–100)
ALBUMIN SERPL-MCNC: 4.1 G/DL (ref 3.5–5.2)
ALBUMIN/GLOB SERPL: 1.3 G/DL
ALP SERPL-CCNC: 99 U/L (ref 39–117)
ALT SERPL W P-5'-P-CCNC: 22 U/L (ref 1–33)
ANION GAP SERPL CALCULATED.3IONS-SCNC: 15.8 MMOL/L (ref 5–15)
AST SERPL-CCNC: 20 U/L (ref 1–32)
BILIRUB SERPL-MCNC: 0.2 MG/DL (ref 0–1.2)
BUN SERPL-MCNC: 21 MG/DL (ref 6–20)
BUN/CREAT SERPL: 15.3 (ref 7–25)
CALCIUM SPEC-SCNC: 9.5 MG/DL (ref 8.6–10.5)
CHLORIDE SERPL-SCNC: 108 MMOL/L (ref 98–107)
CHOLEST SERPL-MCNC: 199 MG/DL (ref 0–200)
CO2 SERPL-SCNC: 20.2 MMOL/L (ref 22–29)
CREAT SERPL-MCNC: 1.37 MG/DL (ref 0.57–1)
EGFRCR SERPLBLD CKD-EPI 2021: 44.6 ML/MIN/1.73
GLOBULIN UR ELPH-MCNC: 3.1 GM/DL
GLUCOSE SERPL-MCNC: 105 MG/DL (ref 65–99)
HDLC SERPL-MCNC: 60 MG/DL (ref 40–60)
LDLC SERPL CALC-MCNC: 124 MG/DL (ref 0–100)
LDLC/HDLC SERPL: 2.04 {RATIO}
POTASSIUM SERPL-SCNC: 4.2 MMOL/L (ref 3.5–5.2)
PROT SERPL-MCNC: 7.2 G/DL (ref 6–8.5)
SODIUM SERPL-SCNC: 144 MMOL/L (ref 136–145)
TRIGL SERPL-MCNC: 83 MG/DL (ref 0–150)
VLDLC SERPL-MCNC: 15 MG/DL (ref 5–40)

## 2024-04-08 RX ORDER — POLYETHYLENE GLYCOL 3350 17 G/17G
17 POWDER, FOR SOLUTION ORAL DAILY
Qty: 510 G | Refills: 1 | Status: SHIPPED | OUTPATIENT
Start: 2024-04-08

## 2024-05-05 DIAGNOSIS — E66.01 CLASS 2 SEVERE OBESITY DUE TO EXCESS CALORIES WITH SERIOUS COMORBIDITY AND BODY MASS INDEX (BMI) OF 36.0 TO 36.9 IN ADULT: ICD-10-CM

## 2024-05-06 RX ORDER — PHENTERMINE HYDROCHLORIDE 15 MG/1
15 CAPSULE ORAL EVERY MORNING
Qty: 30 CAPSULE | Refills: 0 | Status: SHIPPED | OUTPATIENT
Start: 2024-05-06

## 2024-06-02 DIAGNOSIS — E66.01 CLASS 2 SEVERE OBESITY DUE TO EXCESS CALORIES WITH SERIOUS COMORBIDITY AND BODY MASS INDEX (BMI) OF 36.0 TO 36.9 IN ADULT: ICD-10-CM

## 2024-06-03 RX ORDER — FLUTICASONE PROPIONATE 50 MCG
2 SPRAY, SUSPENSION (ML) NASAL DAILY
Qty: 16 ML | Refills: 0 | Status: SHIPPED | OUTPATIENT
Start: 2024-06-03

## 2024-06-03 RX ORDER — ALBUTEROL SULFATE 90 UG/1
2 AEROSOL, METERED RESPIRATORY (INHALATION) EVERY 4 HOURS PRN
Qty: 18 G | Refills: 0 | Status: SHIPPED | OUTPATIENT
Start: 2024-06-03

## 2024-06-03 RX ORDER — PHENTERMINE HYDROCHLORIDE 15 MG/1
15 CAPSULE ORAL EVERY MORNING
Qty: 30 CAPSULE | Refills: 0 | Status: SHIPPED | OUTPATIENT
Start: 2024-06-03

## 2024-06-03 RX ORDER — POLYETHYLENE GLYCOL 3350 17 G/17G
17 POWDER, FOR SOLUTION ORAL DAILY
Qty: 510 G | Refills: 1 | Status: SHIPPED | OUTPATIENT
Start: 2024-06-03

## 2024-06-03 NOTE — TELEPHONE ENCOUNTER
Rx Refill Note  Requested Prescriptions     Pending Prescriptions Disp Refills    polyethylene glycol (MIRALAX) 17 GM/SCOOP powder 510 g 1     Sig: Take 17 g by mouth Daily.    phentermine 15 MG capsule 30 capsule 0     Sig: Take 1 capsule by mouth Every Morning.      Last office visit with prescribing clinician: 3/28/2024   Last telemedicine visit with prescribing clinician: Visit date not found   Next office visit with prescribing clinician: 6/2/2024        Lipid Panel (04/02/2024 11:37)                  Would you like a call back once the refill request has been completed: [] Yes [] No    If the office needs to give you a call back, can they leave a voicemail: [] Yes [] No    Torie Manjarrez, RT  06/03/24, 09:14 EDT

## 2024-06-11 ENCOUNTER — PRIOR AUTHORIZATION (OUTPATIENT)
Dept: FAMILY MEDICINE CLINIC | Facility: CLINIC | Age: 59
End: 2024-06-11
Payer: MEDICARE

## 2024-06-11 RX ORDER — RIMEGEPANT SULFATE 75 MG/75MG
1 TABLET, ORALLY DISINTEGRATING ORAL DAILY PRN
Qty: 9 TABLET | Refills: 0 | Status: SHIPPED | OUTPATIENT
Start: 2024-06-11

## 2024-06-11 NOTE — TELEPHONE ENCOUNTER
HUB to read    PA approved for Nurtec 75MG dispersible tablets    PA approval was faxed to the patients pharmacy      Outcome  Approved today  Approved. This drug has been approved under the Member's Medicare Part D benefit. Approved quantity: 9 units per 30 day(s). You may fill up to a 90 day supply except for those on Specialty Tier 5, which can be filled up to a 30 day supply. Please call the pharmacy to process the prescription claim.  Authorization Expiration Date: 12/31/2099

## 2024-06-18 RX ORDER — IBUPROFEN 800 MG/1
TABLET ORAL
Qty: 90 TABLET | Refills: 0 | Status: SHIPPED | OUTPATIENT
Start: 2024-06-18

## 2024-06-18 NOTE — TELEPHONE ENCOUNTER
Rx Refill Note  Requested Prescriptions     Pending Prescriptions Disp Refills    ibuprofen (ADVIL,MOTRIN) 800 MG tablet [Pharmacy Med Name: IBUPROFEN 800 MG TABLET] 90 tablet 0     Sig: TAKE ONE TABLET BY MOUTH EVERY 8 HOURS AS NEEDED FOR MODERATE PAIN OR HEADACHE      Last office visit with prescribing clinician: 3/28/2024   Last telemedicine visit with prescribing clinician: Visit date not found   Next office visit with prescribing clinician: 7/30/2024        Lipid Panel (04/02/2024 11:37)                  Would you like a call back once the refill request has been completed: [] Yes [] No    If the office needs to give you a call back, can they leave a voicemail: [] Yes [] No    Torie Manjarrez, RT  06/18/24, 09:38 EDT

## 2024-07-01 RX ORDER — DILTIAZEM HYDROCHLORIDE 120 MG/1
120 CAPSULE, EXTENDED RELEASE ORAL NIGHTLY
Qty: 90 CAPSULE | Refills: 0 | Status: SHIPPED | OUTPATIENT
Start: 2024-07-01

## 2024-07-01 NOTE — TELEPHONE ENCOUNTER
Rx Refill Note  Requested Prescriptions     Pending Prescriptions Disp Refills    dilTIAZem XR (DILACOR XR) 120 MG 24 hr capsule [Pharmacy Med Name: dilTIAZem 24H ER(XR) 120 MG CP] 90 capsule 0     Sig: TAKE ONE CAPSULE BY MOUTH ONCE NIGHTLY      Last office visit with prescribing clinician: 3/28/2024   Last telemedicine visit with prescribing clinician: Visit date not found   Next office visit with prescribing clinician: 7/30/2024        Lipid Panel (04/02/2024 11:37)                  Would you like a call back once the refill request has been completed: [] Yes [] No    If the office needs to give you a call back, can they leave a voicemail: [] Yes [] No    Torie Manjarrez, RT  07/01/24, 11:26 EDT

## 2024-07-02 ENCOUNTER — OFFICE VISIT (OUTPATIENT)
Dept: PAIN MEDICINE | Facility: CLINIC | Age: 59
End: 2024-07-02
Payer: MEDICARE

## 2024-07-02 VITALS
SYSTOLIC BLOOD PRESSURE: 151 MMHG | BODY MASS INDEX: 36.72 KG/M2 | OXYGEN SATURATION: 98 % | DIASTOLIC BLOOD PRESSURE: 87 MMHG | WEIGHT: 241.5 LBS | RESPIRATION RATE: 16 BRPM | HEART RATE: 78 BPM

## 2024-07-02 DIAGNOSIS — G43.C0 PERIODIC HEADACHE SYNDROME, NOT INTRACTABLE: ICD-10-CM

## 2024-07-02 DIAGNOSIS — M54.2 CERVICALGIA: ICD-10-CM

## 2024-07-02 DIAGNOSIS — M79.10 MYALGIA: ICD-10-CM

## 2024-07-02 DIAGNOSIS — G89.29 CHRONIC BILATERAL LOW BACK PAIN WITH SCIATICA, SCIATICA LATERALITY UNSPECIFIED: Primary | ICD-10-CM

## 2024-07-02 DIAGNOSIS — M54.12 CERVICAL RADICULOPATHY: ICD-10-CM

## 2024-07-02 DIAGNOSIS — Z79.899 HIGH RISK MEDICATION USE: Primary | ICD-10-CM

## 2024-07-02 DIAGNOSIS — G89.29 CHRONIC BILATERAL THORACIC BACK PAIN: ICD-10-CM

## 2024-07-02 DIAGNOSIS — G89.4 CHRONIC PAIN DISORDER: ICD-10-CM

## 2024-07-02 DIAGNOSIS — M48.02 CERVICAL SPINAL STENOSIS: ICD-10-CM

## 2024-07-02 DIAGNOSIS — M79.7 FIBROMYALGIA: ICD-10-CM

## 2024-07-02 DIAGNOSIS — M54.40 CHRONIC BILATERAL LOW BACK PAIN WITH SCIATICA, SCIATICA LATERALITY UNSPECIFIED: Primary | ICD-10-CM

## 2024-07-02 DIAGNOSIS — M54.6 CHRONIC BILATERAL THORACIC BACK PAIN: ICD-10-CM

## 2024-07-02 PROCEDURE — 1159F MED LIST DOCD IN RCRD: CPT | Performed by: PHYSICAL MEDICINE & REHABILITATION

## 2024-07-02 PROCEDURE — 1125F AMNT PAIN NOTED PAIN PRSNT: CPT | Performed by: PHYSICAL MEDICINE & REHABILITATION

## 2024-07-02 PROCEDURE — 1160F RVW MEDS BY RX/DR IN RCRD: CPT | Performed by: PHYSICAL MEDICINE & REHABILITATION

## 2024-07-02 PROCEDURE — 99214 OFFICE O/P EST MOD 30 MIN: CPT | Performed by: PHYSICAL MEDICINE & REHABILITATION

## 2024-07-02 PROCEDURE — 3079F DIAST BP 80-89 MM HG: CPT | Performed by: PHYSICAL MEDICINE & REHABILITATION

## 2024-07-02 PROCEDURE — 3077F SYST BP >= 140 MM HG: CPT | Performed by: PHYSICAL MEDICINE & REHABILITATION

## 2024-07-02 RX ORDER — HYDROCODONE BITARTRATE AND ACETAMINOPHEN 10; 325 MG/1; MG/1
1 TABLET ORAL 2 TIMES DAILY PRN
Qty: 60 TABLET | Refills: 0 | Status: SHIPPED | OUTPATIENT
Start: 2024-07-02

## 2024-07-02 NOTE — PROGRESS NOTES
Subjective   Nicki Nielsen is a 58 y.o. female.     History of Present Illness  Neck and back pain, 10/10 at worst, 7/10 at best, 6/10 today, always present, varies, aching, stabbing, worse with bending and lifting, interferes with ADLs, sleep, activity, failed PT, chiropractor, meds. MRI C-spine with DDD, stenosis. Saw Dr. Overton, had PT, given Norco 10mg TID prn, began receiving opioids from PCP, down to BID with less relief, PCP retiring, increased here to TID prn. Failed injections, surgery in past. Back pain worse after housework, no b/b incontinence, no new weakness or numbness. Worsening b/l thumb and great toe pain.  Back Pain  Pertinent negatives include no abdominal pain, bladder incontinence, chest pain, fever, numbness or weakness.        The following portions of the patient's history were reviewed and updated as appropriate: allergies, current medications, past family history, past medical history, past social history, past surgical history and problem list.    Review of Systems   Constitutional:  Positive for fatigue. Negative for chills and fever.   HENT:  Negative for hearing loss and trouble swallowing.    Eyes:  Negative for visual disturbance.   Respiratory:  Negative for shortness of breath.    Cardiovascular:  Negative for chest pain.   Gastrointestinal:  Positive for constipation and nausea. Negative for abdominal pain, diarrhea and vomiting.   Genitourinary:  Negative for urinary incontinence.   Musculoskeletal:  Positive for arthralgias, back pain, joint swelling and neck pain. Negative for myalgias.   Neurological:  Positive for dizziness and headache. Negative for weakness and numbness.       Objective   Physical Exam   Constitutional: She is oriented to person, place, and time. She appears well-developed and well-nourished.   HENT:   Head: Normocephalic and atraumatic.   Eyes: Pupils are equal, round, and reactive to light.   Cardiovascular: Normal rate, regular rhythm and  normal heart sounds.   Pulmonary/Chest: Breath sounds normal.   Abdominal: Soft. Bowel sounds are normal. She exhibits no distension. There is no abdominal tenderness.   Neurological: She is alert and oriented to person, place, and time. She has normal reflexes. She displays normal reflexes. No sensory deficit.   Psychiatric: Her behavior is normal. Thought content normal.         Assessment & Plan   Diagnoses and all orders for this visit:    1. Chronic bilateral low back pain with sciatica, sciatica laterality unspecified (Primary)    2. Chronic bilateral thoracic back pain    3. Chronic pain disorder    4. Fibromyalgia    5. Periodic headache syndrome, not intractable    6. Myalgia    7. Cervical radiculopathy    8. Cervical spinal stenosis    9. Cervicalgia      Inspect reviewed, in order. UDS 7/20/21 with THC, has been around people who use marijuana, repeat UDS in order 8/26/22, 6/29/23.  Treatment plan will consist of continuing current medication as long as it remains effective and is necessary, while evaluating patient at each visit and determining if the medication can be lowered or discontinued, while also using nonopioid therapies to reduce reliance on opioids.  Increased to Norco 10mg QID prn, then reduced to TID prn, then BID prn, back to TID prn, now keeping her awake. Rotated to Percocet 7.5mg TID prn, felt poorly and slept poorly. Rotated back to Norco 10mg TID prn. Began Norco 10mg BD prn during the day, MS-Contin 15mg qHS but had breathing issues with morphine, stopped. Continue Norco 10mg BID prn. Filled 6/20/24.   Patient's pain is still well-managed by current medication regimen, is doing well at this strength and dosage, therefore I will continue to prescribe unchanged as the most appropriate course of treatment.  Began Tizanidine 4mg TID prn, refill.  Began Lyrica 50mg BID, cannot tolerate Gabapentin.  Had bad reaction to Silenor 6mg qHS prn, stopped. May try OTC Melatonin.  Failed  injections, surgery.  Insurance will not cover comp cream. Cont Voltaren.  Pt will ask PCP for referral to allergist for possible bee sting allergy.  RTC 3 months for f/u.

## 2024-07-08 DIAGNOSIS — E66.01 CLASS 2 SEVERE OBESITY DUE TO EXCESS CALORIES WITH SERIOUS COMORBIDITY AND BODY MASS INDEX (BMI) OF 36.0 TO 36.9 IN ADULT: ICD-10-CM

## 2024-07-08 RX ORDER — PHENTERMINE HYDROCHLORIDE 15 MG/1
15 CAPSULE ORAL EVERY MORNING
Qty: 30 CAPSULE | Refills: 0 | Status: SHIPPED | OUTPATIENT
Start: 2024-07-08

## 2024-07-08 NOTE — TELEPHONE ENCOUNTER
INSPECT RAN    Rx Refill Note  Requested Prescriptions     Pending Prescriptions Disp Refills    phentermine 15 MG capsule [Pharmacy Med Name: PHENTERMINE 15 MG CAPSULE] 30 capsule      Sig: TAKE 1 CAPSULE BY MOUTH EVERY MORNING      Last office visit with prescribing clinician: 3/28/2024   Last telemedicine visit with prescribing clinician: Visit date not found   Next office visit with prescribing clinician: 7/30/2024                         Would you like a call back once the refill request has been completed: [] Yes [] No    If the office needs to give you a call back, can they leave a voicemail: [] Yes [] No    Torie Manjarrez, RT  07/08/24, 10:55 EDT

## 2024-07-30 ENCOUNTER — OFFICE VISIT (OUTPATIENT)
Dept: FAMILY MEDICINE CLINIC | Facility: CLINIC | Age: 59
End: 2024-07-30
Payer: MEDICARE

## 2024-07-30 VITALS
RESPIRATION RATE: 16 BRPM | WEIGHT: 242 LBS | BODY MASS INDEX: 36.68 KG/M2 | DIASTOLIC BLOOD PRESSURE: 73 MMHG | SYSTOLIC BLOOD PRESSURE: 123 MMHG | HEART RATE: 87 BPM | HEIGHT: 68 IN | OXYGEN SATURATION: 99 % | TEMPERATURE: 98 F

## 2024-07-30 DIAGNOSIS — I10 PRIMARY HYPERTENSION: Chronic | ICD-10-CM

## 2024-07-30 DIAGNOSIS — Z87.891 PERSONAL HISTORY OF NICOTINE DEPENDENCE: ICD-10-CM

## 2024-07-30 DIAGNOSIS — E66.01 CLASS 2 SEVERE OBESITY DUE TO EXCESS CALORIES WITH SERIOUS COMORBIDITY AND BODY MASS INDEX (BMI) OF 36.0 TO 36.9 IN ADULT: Primary | ICD-10-CM

## 2024-07-30 DIAGNOSIS — N28.9 RENAL INSUFFICIENCY: ICD-10-CM

## 2024-07-30 PROCEDURE — 36415 COLL VENOUS BLD VENIPUNCTURE: CPT | Performed by: FAMILY MEDICINE

## 2024-07-30 PROCEDURE — 3074F SYST BP LT 130 MM HG: CPT | Performed by: FAMILY MEDICINE

## 2024-07-30 PROCEDURE — 80048 BASIC METABOLIC PNL TOTAL CA: CPT | Performed by: FAMILY MEDICINE

## 2024-07-30 PROCEDURE — 3078F DIAST BP <80 MM HG: CPT | Performed by: FAMILY MEDICINE

## 2024-07-30 PROCEDURE — 99213 OFFICE O/P EST LOW 20 MIN: CPT | Performed by: FAMILY MEDICINE

## 2024-07-30 PROCEDURE — 1125F AMNT PAIN NOTED PAIN PRSNT: CPT | Performed by: FAMILY MEDICINE

## 2024-07-30 RX ORDER — PHENTERMINE HYDROCHLORIDE 15 MG/1
15 CAPSULE ORAL EVERY MORNING
Qty: 30 CAPSULE | Refills: 0 | Status: SHIPPED | OUTPATIENT
Start: 2024-07-30

## 2024-07-30 RX ORDER — TOPIRAMATE 25 MG/1
25 TABLET ORAL DAILY
Qty: 90 TABLET | Refills: 1 | Status: SHIPPED | OUTPATIENT
Start: 2024-07-30

## 2024-07-30 RX ORDER — OLOPATADINE HYDROCHLORIDE 2 MG/ML
1 SOLUTION/ DROPS OPHTHALMIC DAILY
Qty: 1 EACH | Refills: 3 | Status: SHIPPED | OUTPATIENT
Start: 2024-07-30

## 2024-07-30 RX ORDER — FLUTICASONE PROPIONATE 50 MCG
2 SPRAY, SUSPENSION (ML) NASAL DAILY
Qty: 16 G | Refills: 2 | Status: SHIPPED | OUTPATIENT
Start: 2024-07-30

## 2024-07-30 RX ORDER — POLYETHYLENE GLYCOL 3350 17 G/17G
17 POWDER, FOR SOLUTION ORAL DAILY
Qty: 1530 G | Refills: 1 | Status: SHIPPED | OUTPATIENT
Start: 2024-07-30

## 2024-07-30 RX ORDER — MONTELUKAST SODIUM 10 MG/1
10 TABLET ORAL DAILY
Qty: 90 TABLET | Refills: 2 | Status: SHIPPED | OUTPATIENT
Start: 2024-07-30

## 2024-07-30 RX ORDER — SPIRONOLACTONE 25 MG/1
25 TABLET ORAL DAILY
Qty: 90 TABLET | Refills: 1 | Status: SHIPPED | OUTPATIENT
Start: 2024-07-30

## 2024-07-30 NOTE — PROGRESS NOTES
Subjective   Nicki Nielsen is a 58 y.o. female.     Chief Complaint   Patient presents with    Obesity     Follow up on the phentermine.          Current Outpatient Medications:     albuterol (PROVENTIL) (2.5 MG/3ML) 0.083% nebulizer solution, Take 2.5 mg by nebulization Every 4 (Four) Hours As Needed for Wheezing., Disp: 120 each, Rfl: 0    albuterol sulfate  (90 Base) MCG/ACT inhaler, Inhale 2 puffs Every 4 (Four) Hours As Needed for Wheezing., Disp: 18 g, Rfl: 0    dextromethorphan-guaifenesin (MUCINEX DM MAX STRENGTH)  MG per 12 hr tablet, Take 1 tablet by mouth 2 (Two) Times a Day As Needed., Disp: , Rfl:     Diclofenac Sodium (VOLTAREN) 1 % gel gel, Apply 4 g topically to the appropriate area as directed 4 (Four) Times a Day As Needed (pain)., Disp: 500 g, Rfl: 11    dilTIAZem XR (DILACOR XR) 120 MG 24 hr capsule, TAKE ONE CAPSULE BY MOUTH ONCE NIGHTLY, Disp: 90 capsule, Rfl: 0    EPINEPHrine (EPIPEN) 0.3 MG/0.3ML solution auto-injector injection, Inject 0.3 mL under the skin into the appropriate area as directed As Needed (allergic reaction)., Disp: 1 each, Rfl: 0    fluticasone (FLONASE) 50 MCG/ACT nasal spray, 2 sprays by Each Nare route Daily., Disp: 16 g, Rfl: 2    HYDROcodone-acetaminophen (Norco)  MG per tablet, Take 1 tablet by mouth 2 (Two) Times a Day As Needed for Moderate Pain., Disp: 60 tablet, Rfl: 0    ibuprofen (ADVIL,MOTRIN) 800 MG tablet, TAKE ONE TABLET BY MOUTH EVERY 8 HOURS AS NEEDED FOR MODERATE PAIN OR HEADACHE, Disp: 90 tablet, Rfl: 0    loratadine (CLARITIN) 10 MG tablet, Take 1 tablet by mouth Daily., Disp: , Rfl:     montelukast (SINGULAIR) 10 MG tablet, Take 1 tablet by mouth Daily., Disp: 90 tablet, Rfl: 2    olopatadine (PATADAY) 0.2 % solution ophthalmic solution, Administer 1 drop to both eyes Daily., Disp: 1 each, Rfl: 3    phentermine 15 MG capsule, Take 1 capsule by mouth Every Morning., Disp: 30 capsule, Rfl: 0    polyethylene glycol (MIRALAX)  17 GM/SCOOP powder, Take 17 g by mouth Daily., Disp: 1530 g, Rfl: 1    Rimegepant Sulfate (Nurtec) 75 MG tablet dispersible tablet, Take 1 tablet by mouth Daily As Needed (Migraine)., Disp: 9 tablet, Rfl: 0    spironolactone (ALDACTONE) 25 MG tablet, Take 1 tablet by mouth Daily., Disp: 90 tablet, Rfl: 1    tiZANidine (ZANAFLEX) 4 MG tablet, Take 1 tablet by mouth Every 8 (Eight) Hours As Needed for Muscle Spasms., Disp: 90 tablet, Rfl: 11    topiramate (TOPAMAX) 25 MG tablet, Take 1 tablet by mouth Daily., Disp: 90 tablet, Rfl: 1    Past Medical History:   Diagnosis Date    ADHD     Allergic     Asthma     Cervical radiculopathy     Chronic pain disorder 2020    Neck and left arm    COPD     DDD, cervical     DEXA     = (-0.7/ -1.4)    Extremity pain     Fibromyalgia     Hypertension     Joint pain 2020    Neck and left arm hands    Left shoulder pain     Low back pain     MAMMO     NEG = 2020/ /     Migraine headache     Myalgia     Neck pain     Obesity     Osteopenia     PAP     ASCUS = 2019/ NEG =        Past Surgical History:   Procedure Laterality Date     SECTION      x 1    COLONOSCOPY      COLOGUARD ---NEG = 2022, rech     EPIDURAL BLOCK      LYMPH NODE DISSECTION      cervical node= NEG    TUBAL ABDOMINAL LIGATION         Family History   Problem Relation Age of Onset    Heart disease Mother     Osteoporosis Mother     Aortic aneurysm Mother     Hypertension Father     Kidney disease Father         KIDNEY STONES    Stroke Father     Heart disease Father     Anemia Sister     Arthritis Brother     Diabetes Brother     Heart disease Brother     Hypertension Brother     Heart disease Maternal Grandmother     Aortic aneurysm Maternal Grandmother        Social History     Socioeconomic History    Marital status:    Tobacco Use    Smoking status: Every Day     Current packs/day: 1.00     Average packs/day: 1 pack/day for 30.0 years (30.0 ttl pk-yrs)      Types: Cigarettes     Passive exposure: Current    Smokeless tobacco: Never   Vaping Use    Vaping status: Some Days    Substances: CBD, ecig   Substance and Sexual Activity    Alcohol use: Yes     Comment: occ    Drug use: No    Sexual activity: Not Currently     Partners: Male     Birth control/protection: Tubal ligation, Surgical       History of Present Illness  The patient is a 58-year-old  female here for a 4-month follow-up on weight loss.    She is currently on albuterol, Mucinex DM as needed, Voltaren topical gel as needed, diltiazem 120 once at night, EpiPen as needed, Flonase nasal spray, Norco 10, ibuprofen 800, Claritin as needed, Singulair 10 for allergies, Pataday eyedrops, phentermine 15, Nurtec 75, MiraLAX daily, spironolactone 25, tizanidine as needed, and Topamax 25 in the morning.     She reports that Topamax makes her feel strange. She has a history of COPD and asthma. She attempts to drink water, but feels she may not be drinking enough water. She denies having kidney stones. She has not had a hysterectomy. She enjoys cheese and walks regularly for exercise. She does not require nebulizer medication. She takes ibuprofen once at night and uses Claritin and Singulair year-round. She uses Flonase sparingly when her allergies are not severe.    SOCIAL HISTORY  She is still smoking 1 pack a day. She has been smoking for at least a pack a day for 30 years. She does not drink alcohol.    FAMILY HISTORY  Her sister is extremely anemic and has vitamin D deficiency. Her brothers have no blood pressure, diabetes, arthritis, or heart disease. Her mother had osteoporosis.        The following portions of the patient's history were reviewed and updated as appropriate: allergies, current medications, past family history, past medical history, past social history, past surgical history and problem list.    Review of Systems   Constitutional:  Positive for activity change and appetite change. Negative  for unexpected weight gain and unexpected weight loss.   Cardiovascular:  Negative for chest pain and palpitations.   Psychiatric/Behavioral:  Negative for sleep disturbance. The patient is not nervous/anxious.        Vitals:    07/30/24 1350   BP: 123/73   Pulse: 87   Resp: 16   Temp: 98 °F (36.7 °C)   SpO2: 99%       Objective   Physical Exam  Vitals and nursing note reviewed.   Constitutional:       General: She is not in acute distress.     Appearance: She is not ill-appearing or toxic-appearing.   Pulmonary:      Effort: Pulmonary effort is normal.   Neurological:      Mental Status: She is alert.       Physical Exam  Vital Signs  RR=736/73.            Assessment & Plan   Diagnoses and all orders for this visit:    1. Class 2 severe obesity due to excess calories with serious comorbidity and body mass index (BMI) of 36.0 to 36.9 in adult (Primary)  -     phentermine 15 MG capsule; Take 1 capsule by mouth Every Morning.  Dispense: 30 capsule; Refill: 0    2. Personal history of nicotine dependence  -     CT Chest Low Dose Wo; Future    3. Primary hypertension    4. Renal insufficiency  -     Basic Metabolic Panel    Other orders  -     montelukast (SINGULAIR) 10 MG tablet; Take 1 tablet by mouth Daily.  Dispense: 90 tablet; Refill: 2  -     olopatadine (PATADAY) 0.2 % solution ophthalmic solution; Administer 1 drop to both eyes Daily.  Dispense: 1 each; Refill: 3  -     polyethylene glycol (MIRALAX) 17 GM/SCOOP powder; Take 17 g by mouth Daily.  Dispense: 1530 g; Refill: 1  -     fluticasone (FLONASE) 50 MCG/ACT nasal spray; 2 sprays by Each Nare route Daily.  Dispense: 16 g; Refill: 2  -     spironolactone (ALDACTONE) 25 MG tablet; Take 1 tablet by mouth Daily.  Dispense: 90 tablet; Refill: 1  -     topiramate (TOPAMAX) 25 MG tablet; Take 1 tablet by mouth Daily.  Dispense: 90 tablet; Refill: 1      Assessment & Plan  1. Weight loss.    She has experienced weight loss. Her fasting lab results showed a slight  increase, but her kidney function was stable. Her bone density showed thinning hip bones, indicating a potential risk of falls and potential hip fractures. Her spine was borderline. Her vitamin D levels were normal. A BMP and urine drug screen have been ordered. A CT lung cancer screening has been ordered. Prescriptions for ibuprofen, Claritin, Singulair, allergy eyedrops, MiraLAX, and phentermine have been sent.     Results  Laboratory Studies  Fasting labs showed a slight increase. Kidney function was dry. Vitamin D levels were normal.          Patient or patient representative verbalized consent for the use of Ambient Listening during the visit with  Dea Mcnally DO for chart documentation. 8/18/2024  11:05 EDT

## 2024-07-30 NOTE — PROGRESS NOTES
Venipuncture performed in L ARM by RT Katie  with good hemostasis. Patient tolerated well. 07/30/24 Torie Manjarrez, RT

## 2024-07-31 LAB
ANION GAP SERPL CALCULATED.3IONS-SCNC: 12 MMOL/L (ref 5–15)
BUN SERPL-MCNC: 30 MG/DL (ref 6–20)
BUN/CREAT SERPL: 23.4 (ref 7–25)
CALCIUM SPEC-SCNC: 10 MG/DL (ref 8.6–10.5)
CHLORIDE SERPL-SCNC: 108 MMOL/L (ref 98–107)
CO2 SERPL-SCNC: 22 MMOL/L (ref 22–29)
CREAT SERPL-MCNC: 1.28 MG/DL (ref 0.57–1)
EGFRCR SERPLBLD CKD-EPI 2021: 48.4 ML/MIN/1.73
GLUCOSE SERPL-MCNC: 133 MG/DL (ref 65–99)
POTASSIUM SERPL-SCNC: 5.3 MMOL/L (ref 3.5–5.2)
SODIUM SERPL-SCNC: 142 MMOL/L (ref 136–145)

## 2024-08-01 RX ORDER — MONTELUKAST SODIUM 10 MG/1
10 TABLET ORAL DAILY
Qty: 90 TABLET | Refills: 2 | OUTPATIENT
Start: 2024-08-01

## 2024-08-23 ENCOUNTER — HOSPITAL ENCOUNTER (OUTPATIENT)
Dept: CT IMAGING | Facility: HOSPITAL | Age: 59
Discharge: HOME | End: 2024-08-23
Payer: MEDICARE

## 2024-08-23 DIAGNOSIS — Z87.891 PERSONAL HISTORY OF NICOTINE DEPENDENCE: ICD-10-CM

## 2024-08-23 PROCEDURE — 71271 CT THORAX LUNG CANCER SCR C-: CPT

## 2024-09-08 DIAGNOSIS — E66.01 CLASS 2 SEVERE OBESITY DUE TO EXCESS CALORIES WITH SERIOUS COMORBIDITY AND BODY MASS INDEX (BMI) OF 36.0 TO 36.9 IN ADULT: ICD-10-CM

## 2024-09-09 RX ORDER — PHENTERMINE HYDROCHLORIDE 15 MG/1
15 CAPSULE ORAL EVERY MORNING
Qty: 30 CAPSULE | Refills: 0 | Status: SHIPPED | OUTPATIENT
Start: 2024-09-09

## 2024-09-09 NOTE — TELEPHONE ENCOUNTER
INSPECT RAN    Rx Refill Note  Requested Prescriptions     Pending Prescriptions Disp Refills    phentermine 15 MG capsule [Pharmacy Med Name: PHENTERMINE 15 MG CAPSULE] 30 capsule      Sig: TAKE 1 CAPSULE BY MOUTH EVERY MORNING      Last office visit with prescribing clinician: 7/30/2024   Last telemedicine visit with prescribing clinician: Visit date not found   Next office visit with prescribing clinician: 10/31/2024                         Would you like a call back once the refill request has been completed: [] Yes [] No    If the office needs to give you a call back, can they leave a voicemail: [] Yes [] No    Torie Manjarrez, RT  09/09/24, 10:19 EDT

## 2024-10-01 RX ORDER — DILTIAZEM HYDROCHLORIDE 120 MG/1
120 CAPSULE, EXTENDED RELEASE ORAL NIGHTLY
Qty: 90 CAPSULE | Refills: 0 | Status: SHIPPED | OUTPATIENT
Start: 2024-10-01

## 2024-10-01 NOTE — TELEPHONE ENCOUNTER
Rx Refill Note  Requested Prescriptions     Pending Prescriptions Disp Refills    dilTIAZem XR (DILACOR XR) 120 MG 24 hr capsule [Pharmacy Med Name: dilTIAZem 24H ER(XR) 120 MG CP] 90 capsule 0     Sig: TAKE ONE CAPSULE BY MOUTH ONCE NIGHTLY      Last office visit with prescribing clinician: 7/30/2024   Last telemedicine visit with prescribing clinician: Visit date not found   Next office visit with prescribing clinician: 10/31/2024        Lipid Panel (04/02/2024 11:37)                  Would you like a call back once the refill request has been completed: [] Yes [] No    If the office needs to give you a call back, can they leave a voicemail: [] Yes [] No    Torie Manjarrez, RT  10/01/24, 08:27 EDT

## 2024-10-07 DIAGNOSIS — E66.812 CLASS 2 SEVERE OBESITY DUE TO EXCESS CALORIES WITH SERIOUS COMORBIDITY AND BODY MASS INDEX (BMI) OF 36.0 TO 36.9 IN ADULT: ICD-10-CM

## 2024-10-07 DIAGNOSIS — E66.01 CLASS 2 SEVERE OBESITY DUE TO EXCESS CALORIES WITH SERIOUS COMORBIDITY AND BODY MASS INDEX (BMI) OF 36.0 TO 36.9 IN ADULT: ICD-10-CM

## 2024-10-07 RX ORDER — IBUPROFEN 800 MG/1
TABLET, FILM COATED ORAL
Qty: 90 TABLET | Refills: 0 | Status: SHIPPED | OUTPATIENT
Start: 2024-10-07

## 2024-10-08 ENCOUNTER — OFFICE VISIT (OUTPATIENT)
Dept: PAIN MEDICINE | Facility: CLINIC | Age: 59
End: 2024-10-08
Payer: MEDICARE

## 2024-10-08 VITALS
SYSTOLIC BLOOD PRESSURE: 136 MMHG | HEART RATE: 89 BPM | WEIGHT: 245.1 LBS | OXYGEN SATURATION: 96 % | DIASTOLIC BLOOD PRESSURE: 83 MMHG | BODY MASS INDEX: 37.27 KG/M2 | RESPIRATION RATE: 16 BRPM

## 2024-10-08 DIAGNOSIS — M54.2 CERVICALGIA: ICD-10-CM

## 2024-10-08 DIAGNOSIS — M79.7 FIBROMYALGIA: ICD-10-CM

## 2024-10-08 DIAGNOSIS — M54.12 CERVICAL RADICULOPATHY: ICD-10-CM

## 2024-10-08 DIAGNOSIS — G43.C0 PERIODIC HEADACHE SYNDROME, NOT INTRACTABLE: ICD-10-CM

## 2024-10-08 DIAGNOSIS — G89.4 CHRONIC PAIN DISORDER: ICD-10-CM

## 2024-10-08 DIAGNOSIS — M54.40 CHRONIC BILATERAL LOW BACK PAIN WITH SCIATICA, SCIATICA LATERALITY UNSPECIFIED: Primary | ICD-10-CM

## 2024-10-08 DIAGNOSIS — M79.10 MYALGIA: ICD-10-CM

## 2024-10-08 DIAGNOSIS — M54.6 CHRONIC BILATERAL THORACIC BACK PAIN: ICD-10-CM

## 2024-10-08 DIAGNOSIS — G89.29 CHRONIC BILATERAL THORACIC BACK PAIN: ICD-10-CM

## 2024-10-08 DIAGNOSIS — M48.02 CERVICAL SPINAL STENOSIS: ICD-10-CM

## 2024-10-08 DIAGNOSIS — G89.29 CHRONIC BILATERAL LOW BACK PAIN WITH SCIATICA, SCIATICA LATERALITY UNSPECIFIED: Primary | ICD-10-CM

## 2024-10-08 RX ORDER — HYDROCODONE BITARTRATE AND ACETAMINOPHEN 10; 325 MG/1; MG/1
1 TABLET ORAL 2 TIMES DAILY PRN
Qty: 60 TABLET | Refills: 0 | Status: SHIPPED | OUTPATIENT
Start: 2024-10-08

## 2024-10-08 RX ORDER — PHENTERMINE HYDROCHLORIDE 15 MG/1
15 CAPSULE ORAL EVERY MORNING
Qty: 30 CAPSULE | Refills: 0 | Status: SHIPPED | OUTPATIENT
Start: 2024-10-08

## 2024-10-08 NOTE — PROGRESS NOTES
Subjective   Nicki Nielsen is a 59 y.o. female.     History of Present Illness  Neck and back pain, 10/10 at worst, 7/10 at best, 6/10 today, always present, varies, aching, stabbing, worse with bending and lifting, interferes with ADLs, sleep, activity, failed PT, chiropractor, meds. MRI C-spine with DDD, stenosis. Saw Dr. Overton, had PT, given Norco 10mg TID prn, began receiving opioids from PCP, down to BID with less relief, PCP retiring, increased here to TID prn. Failed injections, surgery in past. Back pain worse after housework, no b/b incontinence, no new weakness or numbness. Worsening b/l thumb and great toe pain.  Back Pain  Pertinent negatives include no abdominal pain, bladder incontinence, chest pain, fever, numbness or weakness.        The following portions of the patient's history were reviewed and updated as appropriate: allergies, current medications, past family history, past medical history, past social history, past surgical history and problem list.    Review of Systems   Constitutional:  Positive for fatigue. Negative for chills and fever.   HENT:  Negative for hearing loss and trouble swallowing.    Eyes:  Negative for visual disturbance.   Respiratory:  Negative for shortness of breath.    Cardiovascular:  Negative for chest pain.   Gastrointestinal:  Positive for constipation and nausea. Negative for abdominal pain, diarrhea and vomiting.   Genitourinary:  Negative for urinary incontinence.   Musculoskeletal:  Positive for arthralgias, back pain, joint swelling and neck pain. Negative for myalgias.   Neurological:  Positive for dizziness and headache. Negative for weakness and numbness.       Objective   Physical Exam   Constitutional: She is oriented to person, place, and time. She appears well-developed and well-nourished.   HENT:   Head: Normocephalic and atraumatic.   Eyes: Pupils are equal, round, and reactive to light.   Cardiovascular: Normal rate, regular rhythm and  normal heart sounds.   Pulmonary/Chest: Breath sounds normal.   Abdominal: Soft. Bowel sounds are normal. She exhibits no distension. There is no abdominal tenderness.   Neurological: She is alert and oriented to person, place, and time. She has normal reflexes. She displays normal reflexes. No sensory deficit.   Psychiatric: Her behavior is normal. Thought content normal.         Assessment & Plan   Diagnoses and all orders for this visit:    1. Chronic bilateral low back pain with sciatica, sciatica laterality unspecified (Primary)    2. Chronic bilateral thoracic back pain    3. Chronic pain disorder    4. Cervicalgia    5. Cervical spinal stenosis    6. Cervical radiculopathy    7. Fibromyalgia    8. Periodic headache syndrome, not intractable    9. Myalgia      Inspect reviewed, in order. UDS 7/20/21 with THC, has been around people who use marijuana, repeat UDS in order 8/26/22, 6/29/23, 7/2/24.  Treatment plan will consist of continuing current medication as long as it remains effective and is necessary, while evaluating patient at each visit and determining if the medication can be lowered or discontinued, while also using nonopioid therapies to reduce reliance on opioids.  Increased to Norco 10mg QID prn, then reduced to TID prn, then BID prn, back to TID prn, now keeping her awake. Rotated to Percocet 7.5mg TID prn, felt poorly and slept poorly. Rotated back to Norco 10mg TID prn. Began Norco 10mg BD prn during the day, MS-Contin 15mg qHS but had breathing issues with morphine, stopped. Continue Norco 10mg BID prn. Filled 9/11/24.   Patient's pain is still well-managed by current medication regimen, is doing well at this strength and dosage, therefore I will continue to prescribe unchanged as the most appropriate course of treatment.  Began Tizanidine 4mg TID prn, refill.  Began Lyrica 50mg BID, cannot tolerate Gabapentin.  Had bad reaction to Silenor 6mg qHS prn, stopped. May try OTC Melatonin.  Failed  injections, surgery.  Insurance will not cover comp cream. Cont Voltaren.  Pt will ask PCP for referral to allergist for possible bee sting allergy.  RTC 3 months for f/u.

## 2024-10-31 ENCOUNTER — OFFICE VISIT (OUTPATIENT)
Dept: FAMILY MEDICINE CLINIC | Facility: CLINIC | Age: 59
End: 2024-10-31
Payer: MEDICARE

## 2024-10-31 VITALS
WEIGHT: 243 LBS | BODY MASS INDEX: 36.83 KG/M2 | SYSTOLIC BLOOD PRESSURE: 127 MMHG | HEART RATE: 88 BPM | HEIGHT: 68 IN | OXYGEN SATURATION: 96 % | DIASTOLIC BLOOD PRESSURE: 79 MMHG | TEMPERATURE: 97.7 F

## 2024-10-31 DIAGNOSIS — G89.4 CHRONIC PAIN DISORDER: ICD-10-CM

## 2024-10-31 DIAGNOSIS — E67.3 HYPERVITAMINOSIS D: ICD-10-CM

## 2024-10-31 DIAGNOSIS — E66.812 CLASS 2 SEVERE OBESITY DUE TO EXCESS CALORIES WITH SERIOUS COMORBIDITY AND BODY MASS INDEX (BMI) OF 36.0 TO 36.9 IN ADULT: Primary | ICD-10-CM

## 2024-10-31 DIAGNOSIS — E66.01 CLASS 2 SEVERE OBESITY DUE TO EXCESS CALORIES WITH SERIOUS COMORBIDITY AND BODY MASS INDEX (BMI) OF 36.0 TO 36.9 IN ADULT: Primary | ICD-10-CM

## 2024-10-31 DIAGNOSIS — Z13.220 SCREENING FOR HYPERLIPIDEMIA: ICD-10-CM

## 2024-10-31 DIAGNOSIS — I10 PRIMARY HYPERTENSION: ICD-10-CM

## 2024-10-31 RX ORDER — ALBUTEROL SULFATE 90 UG/1
2 INHALANT RESPIRATORY (INHALATION) EVERY 4 HOURS PRN
Qty: 18 G | Refills: 0 | Status: SHIPPED | OUTPATIENT
Start: 2024-10-31

## 2024-10-31 NOTE — PROGRESS NOTES
Subjective   Nicki Nielsen is a 59 y.o. female.     Chief Complaint   Patient presents with    Follow-up     3 mo f/u     Hypertension    Obesity         Current Outpatient Medications:     albuterol (PROVENTIL) (2.5 MG/3ML) 0.083% nebulizer solution, Take 2.5 mg by nebulization Every 4 (Four) Hours As Needed for Wheezing., Disp: 120 each, Rfl: 0    albuterol sulfate  (90 Base) MCG/ACT inhaler, Inhale 2 puffs Every 4 (Four) Hours As Needed for Wheezing., Disp: 18 g, Rfl: 0    dextromethorphan-guaifenesin (MUCINEX DM MAX STRENGTH)  MG per 12 hr tablet, Take 1 tablet by mouth 2 (Two) Times a Day As Needed., Disp: , Rfl:     Diclofenac Sodium (VOLTAREN) 1 % gel gel, Apply 4 g topically to the appropriate area as directed 4 (Four) Times a Day As Needed (pain)., Disp: 500 g, Rfl: 11    dilTIAZem XR (DILACOR XR) 120 MG 24 hr capsule, TAKE ONE CAPSULE BY MOUTH ONCE NIGHTLY, Disp: 90 capsule, Rfl: 0    EPINEPHrine (EPIPEN) 0.3 MG/0.3ML solution auto-injector injection, Inject 0.3 mL under the skin into the appropriate area as directed As Needed (allergic reaction)., Disp: 1 each, Rfl: 0    fluticasone (FLONASE) 50 MCG/ACT nasal spray, 2 sprays by Each Nare route Daily., Disp: 16 g, Rfl: 2    HYDROcodone-acetaminophen (Norco)  MG per tablet, Take 1 tablet by mouth 2 (Two) Times a Day As Needed for Moderate Pain., Disp: 60 tablet, Rfl: 0    ibuprofen (ADVIL,MOTRIN) 800 MG tablet, TAKE ONE TABLET BY MOUTH EVERY 8 HOURS AS NEEDED FOR MODERATE PAIN OR HEADACHE, Disp: 90 tablet, Rfl: 0    loratadine (CLARITIN) 10 MG tablet, Take 1 tablet by mouth Daily., Disp: , Rfl:     montelukast (SINGULAIR) 10 MG tablet, Take 1 tablet by mouth Daily., Disp: 90 tablet, Rfl: 2    olopatadine (PATADAY) 0.2 % solution ophthalmic solution, Administer 1 drop to both eyes Daily., Disp: 1 each, Rfl: 3    phentermine 15 MG capsule, TAKE 1 CAPSULE BY MOUTH EVERY MORNING, Disp: 30 capsule, Rfl: 0    polyethylene glycol  (MIRALAX) 17 GM/SCOOP powder, Take 17 g by mouth Daily., Disp: 1530 g, Rfl: 1    Rimegepant Sulfate (Nurtec) 75 MG tablet dispersible tablet, Take 1 tablet by mouth Daily As Needed (Migraine)., Disp: 9 tablet, Rfl: 0    spironolactone (ALDACTONE) 25 MG tablet, Take 1 tablet by mouth Daily., Disp: 90 tablet, Rfl: 1    tiZANidine (ZANAFLEX) 4 MG tablet, Take 1 tablet by mouth Every 8 (Eight) Hours As Needed for Muscle Spasms., Disp: 90 tablet, Rfl: 11    topiramate (TOPAMAX) 25 MG tablet, Take 1 tablet by mouth Daily., Disp: 90 tablet, Rfl: 1    Past Medical History:   Diagnosis Date    ADHD     Allergic     Asthma     Cervical radiculopathy     Chronic pain disorder 2020    Neck and left arm    COPD     DDD, cervical     DEXA     = (-0.7/ -1.4)    Extremity pain     Fibromyalgia     Hypertension     Joint pain 2020    Neck and left arm hands    Left shoulder pain     Low back pain     MAMMO     NEG = 2020/ /     Migraine headache     Myalgia     Neck pain     Obesity     Osteopenia     PAP     ASCUS = 2019/ NEG =        Past Surgical History:   Procedure Laterality Date     SECTION      x 1    COLONOSCOPY      COLOGUARD ---NEG = 2022, rech     EPIDURAL BLOCK      LYMPH NODE DISSECTION      cervical node= NEG    TUBAL ABDOMINAL LIGATION         Family History   Problem Relation Age of Onset    Heart disease Mother     Osteoporosis Mother     Aortic aneurysm Mother     Hypertension Father     Kidney disease Father         KIDNEY STONES    Stroke Father     Heart disease Father     Anemia Sister     Arthritis Brother     Diabetes Brother     Heart disease Brother     Hypertension Brother     Heart disease Maternal Grandmother     Aortic aneurysm Maternal Grandmother        Social History     Socioeconomic History    Marital status:    Tobacco Use    Smoking status: Every Day     Current packs/day: 1.00     Average packs/day: 1 pack/day for 30.0 years (30.0 ttl  pk-yrs)     Types: Cigarettes     Passive exposure: Current    Smokeless tobacco: Never   Vaping Use    Vaping status: Some Days    Substances: CBD, ecig   Substance and Sexual Activity    Alcohol use: Yes     Comment: occ    Drug use: No    Sexual activity: Not Currently     Partners: Male     Birth control/protection: Tubal ligation, Surgical       History of Present Illness  The patient is a 59-year-old female who presents for a 3-month follow-up on hypertension and weight loss.    She has been making efforts to maintain her weight, including walking a few miles weekly and aiming for at least 2000 steps daily. She consumes 3 to 4 quarts of water daily and is not currently taking any diuretics.    Her current medication regimen includes a rescue inhaler, plain albuterol nebulizer solution, Mucinex DM, Voltaren gel, Cardizem 120, EpiPen, Flonase, Norco 10 twice a day, ibuprofen 800 three times a day as needed, Claritin, Singulair, Pataday eyedrops for allergies, MiraLAX as needed, Nurtec 75 as needed, spironolactone 25, tizanidine for muscle spasms as needed, Topamax 25 once a day, and phentermine 15. She is not using any daily inhalers and reports no shortness of breath. She has a rescue inhaler and EpiPen available for emergencies.    She has declined the influenza vaccine due to previous adverse reaction and is uncertain about receiving further COVID-19 vaccines. She is considering the shingles vaccine but is unsure.    She reports no new allergies to medications. She has not had any follow-up labs since her last visit.    SOCIAL HISTORY  She smokes a pack a day.    FAMILY HISTORY  Her father had COPD.        The following portions of the patient's history were reviewed and updated as appropriate: allergies, current medications, past family history, past medical history, past social history, past surgical history and problem list.    Review of Systems   Constitutional:  Positive for activity change and appetite  change. Negative for unexpected weight gain and unexpected weight loss.   Respiratory:  Negative for cough, shortness of breath and wheezing.    Musculoskeletal:  Positive for arthralgias and back pain.       Vitals:    10/31/24 1335   BP: 127/79   Pulse: 88   Temp: 97.7 °F (36.5 °C)   SpO2: 96%       Objective   Physical Exam  Vitals and nursing note reviewed.   Constitutional:       General: She is not in acute distress.     Appearance: She is well-developed. She is obese. She is not ill-appearing or toxic-appearing.   HENT:      Head: Normocephalic and atraumatic.   Cardiovascular:      Rate and Rhythm: Normal rate and regular rhythm.      Heart sounds: Normal heart sounds. No murmur heard.  Pulmonary:      Effort: Pulmonary effort is normal. No respiratory distress.      Breath sounds: Normal breath sounds. No wheezing, rhonchi or rales.   Skin:     General: Skin is warm and dry.      Findings: No rash.   Neurological:      Mental Status: She is alert and oriented to person, place, and time.      Cranial Nerves: No cranial nerve deficit.   Psychiatric:         Attention and Perception: Attention and perception normal.         Mood and Affect: Mood and affect normal.         Speech: Speech normal.         Behavior: Behavior normal. Behavior is cooperative.         Thought Content: Thought content normal.         Cognition and Memory: Cognition and memory normal.         Judgment: Judgment normal.       Physical Exam       Assessment & Plan   Diagnoses and all orders for this visit:    1. Class 2 severe obesity due to excess calories with serious comorbidity and body mass index (BMI) of 36.0 to 36.9 in adult (Primary)    2. Primary hypertension  -     Comprehensive Metabolic Panel; Future    3. Chronic pain disorder    4. Hypervitaminosis D  -     Vitamin D,25-Hydroxy; Future    5. Screening for hyperlipidemia  -     Lipid Panel; Future    Other orders  -     albuterol sulfate  (90 Base) MCG/ACT inhaler;  Inhale 2 puffs Every 4 (Four) Hours As Needed for Wheezing.  Dispense: 18 g; Refill: 0      Assessment & Plan  1. Hypertension.  Her blood pressure readings are within the normal range. She is advised to continue her current medication regimen, including Cardizem 120 mg.    2. Weight Management.  She has maintained her weight since March, with no further weight loss. She is encouraged to increase her physical activity, such as walking daily and considering using a home peddler or a recumbent bike to avoid back pain. She is also advised to drink more fluids to help with overall health.    3. Back Pain.  She experiences back stiffness, which limits her ability to walk. She is advised to try a home peddler or a recumbent bike to reduce strain on her back while exercising.    4. Medication Management.  A refill for her rescue inhaler will be provided. She is also on multiple medications, including albuterol nebulizer solution, Mucinex DM, Voltaren gel, EpiPen, Flonase, Norco 10 mg twice a day, Ibuprofen 800 mg three times a day as needed, Claritin, Singulair, Pataday eyedrops, MiraLAX, Nurtec 75 mg as needed, spironolactone 25 mg, tizanidine for muscle spasms as needed, Topamax 25 mg once a day, and phentermine 15 mg.    5. Health Maintenance.  She is advised to consider the shingles vaccine and the pneumonia shot, especially given her age and potential lung issues. She is also informed about the RSV vaccine, which is recommended for people over 65.    Follow-up  Return in January or early February for comprehensive labs, including cholesterol level, lipids, CMP, and vitamin D.     Results  Imaging  CAT scan chest CT showed mild heart calcifications, no fluid, and no suspicious lung nodules.          Patient or patient representative verbalized consent for the use of Ambient Listening during the visit with  Dea Mcnally DO for chart documentation. 11/2/2024  14:21 EDT

## 2024-11-01 ENCOUNTER — PATIENT ROUNDING (BHMG ONLY) (OUTPATIENT)
Dept: FAMILY MEDICINE CLINIC | Facility: CLINIC | Age: 59
End: 2024-11-01
Payer: MEDICARE

## 2024-11-12 DIAGNOSIS — E66.812 CLASS 2 SEVERE OBESITY DUE TO EXCESS CALORIES WITH SERIOUS COMORBIDITY AND BODY MASS INDEX (BMI) OF 36.0 TO 36.9 IN ADULT: ICD-10-CM

## 2024-11-12 DIAGNOSIS — E66.01 CLASS 2 SEVERE OBESITY DUE TO EXCESS CALORIES WITH SERIOUS COMORBIDITY AND BODY MASS INDEX (BMI) OF 36.0 TO 36.9 IN ADULT: ICD-10-CM

## 2024-11-12 RX ORDER — PHENTERMINE HYDROCHLORIDE 15 MG/1
15 CAPSULE ORAL EVERY MORNING
Qty: 30 CAPSULE | Refills: 0 | Status: SHIPPED | OUTPATIENT
Start: 2024-11-12

## 2024-11-12 NOTE — TELEPHONE ENCOUNTER
Rx Refill Note  Requested Prescriptions     Pending Prescriptions Disp Refills    phentermine 15 MG capsule [Pharmacy Med Name: PHENTERMINE 15 MG CAPSULE] 30 capsule      Sig: TAKE 1 CAPSULE BY MOUTH EVERY MORNING      Last office visit with prescribing clinician: 10/31/2024   Last telemedicine visit with prescribing clinician: Visit date not found   Next office visit with prescribing clinician: 11/12/2024     Office Visit with Dea Mcnally DO (10/31/2024)   Basic Metabolic Panel (07/30/2024 14:28)  Urine Drug Screen - Urine, Clean Catch (07/03/2024 02:01)  Vitamin D,25-Hydroxy (04/02/2024 11:37)  Comprehensive Metabolic Panel (04/02/2024 11:37)  Lipid Panel (04/02/2024 11:37)  LABS SCANNED (04/02/2024)                    Would you like a call back once the refill request has been completed: [] Yes [] No    If the office needs to give you a call back, can they leave a voicemail: [] Yes [] No    Vic Weir  11/12/24, 10:52 EST

## 2024-12-11 DIAGNOSIS — E66.01 CLASS 2 SEVERE OBESITY DUE TO EXCESS CALORIES WITH SERIOUS COMORBIDITY AND BODY MASS INDEX (BMI) OF 36.0 TO 36.9 IN ADULT: ICD-10-CM

## 2024-12-11 DIAGNOSIS — E66.812 CLASS 2 SEVERE OBESITY DUE TO EXCESS CALORIES WITH SERIOUS COMORBIDITY AND BODY MASS INDEX (BMI) OF 36.0 TO 36.9 IN ADULT: ICD-10-CM

## 2024-12-11 RX ORDER — PHENTERMINE HYDROCHLORIDE 15 MG/1
15 CAPSULE ORAL EVERY MORNING
Qty: 30 CAPSULE | Refills: 0 | Status: SHIPPED | OUTPATIENT
Start: 2024-12-11

## 2024-12-11 NOTE — TELEPHONE ENCOUNTER
INS[facility]    Rx Refill Note  Requested Prescriptions     Pending Prescriptions Disp Refills    phentermine 15 MG capsule [Pharmacy Med Name: PHENTERMINE 15 MG CAPSULE] 30 capsule      Sig: TAKE 1 CAPSULE BY MOUTH EVERY MORNING      Last office visit with prescribing clinician: 10/31/2024   Last telemedicine visit with prescribing clinician: Visit date not found   Next office visit with prescribing clinician: Visit date not found                         Would you like a call back once the refill request has been completed: [] Yes [] No    If the office needs to give you a call back, can they leave a voicemail: [] Yes [] No    Torie Manjarrez, RT  12/11/24, 12:06 EST

## 2024-12-18 RX ORDER — IBUPROFEN 800 MG/1
TABLET, FILM COATED ORAL
Qty: 90 TABLET | Refills: 0 | Status: SHIPPED | OUTPATIENT
Start: 2024-12-18

## 2024-12-18 NOTE — TELEPHONE ENCOUNTER
Rx Refill Note  Requested Prescriptions     Pending Prescriptions Disp Refills    ibuprofen (ADVIL,MOTRIN) 800 MG tablet [Pharmacy Med Name: IBUPROFEN 800 MG TABLET] 90 tablet 0     Sig: TAKE ONE TABLET BY MOUTH EVERY 8 HOURS AS NEEDED FOR MODERATE PAIN OR HEADACHE      Last office visit with prescribing clinician: 10/31/2024   Last telemedicine visit with prescribing clinician: Visit date not found   Next office visit with prescribing clinician: Visit date not found                         Would you like a call back once the refill request has been completed: [] Yes [] No    If the office needs to give you a call back, can they leave a voicemail: [] Yes [] No    Torie Manjarrez, RT  12/18/24, 09:12 EST

## 2024-12-20 RX ORDER — FLUTICASONE PROPIONATE 50 MCG
SPRAY, SUSPENSION (ML) NASAL
Qty: 16 G | Refills: 3 | Status: SHIPPED | OUTPATIENT
Start: 2024-12-20

## 2024-12-20 NOTE — TELEPHONE ENCOUNTER
Rx Refill Note  Requested Prescriptions     Pending Prescriptions Disp Refills    fluticasone (FLONASE) 50 MCG/ACT nasal spray [Pharmacy Med Name: FLUTICASONE PROP 50 MCG SPRAY] 16 g      Sig: SPRAY 2 SPRAYS IN EACH NOSTRIL ONCE DAILY      Last office visit with prescribing clinician: 10/31/2024   Last telemedicine visit with prescribing clinician: Visit date not found   Next office visit with prescribing clinician: Visit date not found        Lipid Panel (04/02/2024 11:37)                  Would you like a call back once the refill request has been completed: [] Yes [] No    If the office needs to give you a call back, can they leave a voicemail: [] Yes [] No    Torie Manjarrez, RT  12/20/24, 08:34 EST

## 2024-12-23 RX ORDER — IBUPROFEN 800 MG/1
TABLET, FILM COATED ORAL
Qty: 90 TABLET | Refills: 0 | OUTPATIENT
Start: 2024-12-23

## 2025-01-02 RX ORDER — DILTIAZEM HYDROCHLORIDE 120 MG/1
120 CAPSULE, EXTENDED RELEASE ORAL NIGHTLY
Qty: 90 CAPSULE | Refills: 0 | Status: SHIPPED | OUTPATIENT
Start: 2025-01-02

## 2025-01-02 NOTE — TELEPHONE ENCOUNTER
Rx Refill Note  Requested Prescriptions     Pending Prescriptions Disp Refills    dilTIAZem XR (DILACOR XR) 120 MG 24 hr capsule [Pharmacy Med Name: dilTIAZem 24H ER(XR) 120 MG CP] 90 capsule 0     Sig: TAKE ONE CAPSULE BY MOUTH ONCE NIGHTLY      Last office visit with prescribing clinician: 10/31/2024   Last telemedicine visit with prescribing clinician: Visit date not found   Next office visit with prescribing clinician: Visit date not found        Lipid Panel (04/02/2024 11:37)                  Would you like a call back once the refill request has been completed: [] Yes [] No    If the office needs to give you a call back, can they leave a voicemail: [] Yes [] No    Torie Manjarrez, RT  01/02/25, 08:14 EST

## 2025-01-08 DIAGNOSIS — E66.812 CLASS 2 SEVERE OBESITY DUE TO EXCESS CALORIES WITH SERIOUS COMORBIDITY AND BODY MASS INDEX (BMI) OF 36.0 TO 36.9 IN ADULT: ICD-10-CM

## 2025-01-08 DIAGNOSIS — E66.01 CLASS 2 SEVERE OBESITY DUE TO EXCESS CALORIES WITH SERIOUS COMORBIDITY AND BODY MASS INDEX (BMI) OF 36.0 TO 36.9 IN ADULT: ICD-10-CM

## 2025-01-08 RX ORDER — PHENTERMINE HYDROCHLORIDE 15 MG/1
15 CAPSULE ORAL EVERY MORNING
Qty: 30 CAPSULE | Refills: 0 | Status: SHIPPED | OUTPATIENT
Start: 2025-01-08

## 2025-01-08 NOTE — TELEPHONE ENCOUNTER
INSPECT R[facility]    Rx Refill Note  Requested Prescriptions     Pending Prescriptions Disp Refills    phentermine 15 MG capsule [Pharmacy Med Name: PHENTERMINE 15 MG CAPSULE] 30 capsule      Sig: TAKE 1 CAPSULE BY MOUTH EVERY MORNING      Last office visit with prescribing clinician: 10/31/2024   Last telemedicine visit with prescribing clinician: Visit date not found   Next office visit with prescribing clinician: Visit date not found                         Would you like a call back once the refill request has been completed: [] Yes [] No    If the office needs to give you a call back, can they leave a voicemail: [] Yes [] No    Torie Manjarrez, RT  01/08/25, 13:05 EST

## 2025-01-14 ENCOUNTER — OFFICE VISIT (OUTPATIENT)
Dept: PAIN MEDICINE | Facility: CLINIC | Age: 60
End: 2025-01-14
Payer: MEDICARE

## 2025-01-14 VITALS
SYSTOLIC BLOOD PRESSURE: 98 MMHG | RESPIRATION RATE: 16 BRPM | DIASTOLIC BLOOD PRESSURE: 68 MMHG | BODY MASS INDEX: 37.85 KG/M2 | HEART RATE: 99 BPM | WEIGHT: 248.9 LBS | OXYGEN SATURATION: 99 %

## 2025-01-14 DIAGNOSIS — M54.12 CERVICAL RADICULOPATHY: ICD-10-CM

## 2025-01-14 DIAGNOSIS — M54.6 CHRONIC BILATERAL THORACIC BACK PAIN: ICD-10-CM

## 2025-01-14 DIAGNOSIS — G89.4 CHRONIC PAIN DISORDER: ICD-10-CM

## 2025-01-14 DIAGNOSIS — G89.29 CHRONIC BILATERAL THORACIC BACK PAIN: ICD-10-CM

## 2025-01-14 DIAGNOSIS — M79.10 MYALGIA: ICD-10-CM

## 2025-01-14 DIAGNOSIS — G43.C0 PERIODIC HEADACHE SYNDROME, NOT INTRACTABLE: ICD-10-CM

## 2025-01-14 DIAGNOSIS — G89.29 CHRONIC BILATERAL LOW BACK PAIN WITH SCIATICA, SCIATICA LATERALITY UNSPECIFIED: Primary | ICD-10-CM

## 2025-01-14 DIAGNOSIS — M54.40 CHRONIC BILATERAL LOW BACK PAIN WITH SCIATICA, SCIATICA LATERALITY UNSPECIFIED: Primary | ICD-10-CM

## 2025-01-14 DIAGNOSIS — M79.7 FIBROMYALGIA: ICD-10-CM

## 2025-01-14 DIAGNOSIS — M48.02 CERVICAL SPINAL STENOSIS: ICD-10-CM

## 2025-01-14 DIAGNOSIS — M54.2 CERVICALGIA: ICD-10-CM

## 2025-01-14 RX ORDER — HYDROCODONE BITARTRATE AND ACETAMINOPHEN 10; 325 MG/1; MG/1
1 TABLET ORAL 2 TIMES DAILY PRN
Qty: 60 TABLET | Refills: 0 | Status: SHIPPED | OUTPATIENT
Start: 2025-01-14

## 2025-01-14 NOTE — PROGRESS NOTES
Subjective   Nicki Nielsen is a 59 y.o. female.     History of Present Illness  CC: neck and LBP    Neck and back pain, 10/10 at worst, 7/10 at best, began several decades ago, began treatment with me 7/2/19, always present, varies, aching, stabbing, worse with bending and lifting, interferes with ADLs, sleep, activity, failed PT, chiropractor, meds. MRI C-spine with DDD, stenosis. Saw Dr. Overton, had PT, given Norco 10mg TID prn, began receiving opioids from PCP, down to BID with less relief, PCP retiring, increased here to TID prn. Failed injections, surgery in past. Back pain worse after housework, no b/b incontinence, no new weakness or numbness. Worsening b/l thumb and great toe pain.  Back Pain  Pertinent negatives include no abdominal pain, bladder incontinence, chest pain, fever, numbness or weakness.        The following portions of the patient's history were reviewed and updated as appropriate: allergies, current medications, past family history, past medical history, past social history, past surgical history and problem list.    Review of Systems   Constitutional:  Positive for fatigue. Negative for chills and fever.   HENT:  Negative for hearing loss and trouble swallowing.    Eyes:  Negative for visual disturbance.   Respiratory:  Negative for shortness of breath.    Cardiovascular:  Negative for chest pain.   Gastrointestinal:  Positive for constipation and nausea. Negative for abdominal pain, diarrhea and vomiting.   Genitourinary:  Negative for urinary incontinence.   Musculoskeletal:  Positive for arthralgias, back pain, joint swelling and neck pain. Negative for myalgias.   Neurological:  Positive for dizziness and headache. Negative for weakness and numbness.       Objective   Physical Exam   Constitutional: She is oriented to person, place, and time. She appears well-developed and well-nourished.   HENT:   Head: Normocephalic and atraumatic.   Eyes: Pupils are equal, round, and  reactive to light.   Cardiovascular: Normal rate, regular rhythm and normal heart sounds.   Pulmonary/Chest: Breath sounds normal.   Abdominal: Soft. Bowel sounds are normal. She exhibits no distension. There is no abdominal tenderness.   Neurological: She is alert and oriented to person, place, and time. She has normal reflexes. She displays normal reflexes. No sensory deficit.   Psychiatric: Her behavior is normal. Thought content normal.         Assessment & Plan   Diagnoses and all orders for this visit:    1. Chronic bilateral low back pain with sciatica, sciatica laterality unspecified (Primary)    2. Chronic bilateral thoracic back pain    3. Chronic pain disorder    4. Fibromyalgia    5. Periodic headache syndrome, not intractable    6. Myalgia    7. Cervicalgia    8. Cervical spinal stenosis    9. Cervical radiculopathy      Inspect reviewed, in order. UDS 7/20/21 with THC, has been around people who use marijuana, repeat UDS in order 8/26/22, 6/29/23, 7/2/24.  Treatment plan will consist of continuing current medication as long as it remains effective and is necessary, while evaluating patient at each visit and determining if the medication can be lowered or discontinued, while also using nonopioid therapies to reduce reliance on opioids.  Increased to Norco 10mg QID prn, then reduced to TID prn, then BID prn, back to TID prn, now keeping her awake. Rotated to Percocet 7.5mg TID prn, felt poorly and slept poorly. Rotated back to Norco 10mg TID prn. Began Norco 10mg BD prn during the day, MS-Contin 15mg qHS but had breathing issues with morphine, stopped. Continue Norco 10mg BID prn. Filled 12/20/24.   Patient's pain is still well-managed by current medication regimen, is doing well at this strength and dosage, therefore I will continue to prescribe unchanged as the most appropriate course of treatment.  Began Tizanidine 4mg TID prn, refill.  Began Lyrica 50mg BID, cannot tolerate Gabapentin.  Had bad  reaction to Silenor 6mg qHS prn, stopped. May try OTC Melatonin.  Failed injections, surgery.  Insurance will not cover comp cream. Cont Voltaren.  Pt will ask PCP for referral to allergist for possible bee sting allergy.  RTC 3 months for f/u.

## 2025-01-24 NOTE — TELEPHONE ENCOUNTER
Incoming Refill Request      Medication requested (name and dose): IBUPROFEN 800MG    Pharmacy where request should be sent: [facility]    Additional details provided by patient: N/A    Best call back number:     Does the patient have less than a 3 day supply:  [] Yes  [x] No    Chadd Salvador Rep  01/24/25, 16:43 EST

## 2025-01-27 RX ORDER — IBUPROFEN 800 MG/1
TABLET, FILM COATED ORAL
Qty: 90 TABLET | Refills: 0 | OUTPATIENT
Start: 2025-01-27

## 2025-01-27 RX ORDER — IBUPROFEN 800 MG/1
TABLET, FILM COATED ORAL
Qty: 90 TABLET | Refills: 0 | Status: SHIPPED | OUTPATIENT
Start: 2025-01-27

## 2025-01-29 RX ORDER — SPIRONOLACTONE 25 MG/1
25 TABLET ORAL DAILY
Qty: 90 TABLET | Refills: 1 | Status: SHIPPED | OUTPATIENT
Start: 2025-01-29

## 2025-01-29 NOTE — TELEPHONE ENCOUNTER
Rx Refill Note  Requested Prescriptions     Pending Prescriptions Disp Refills    spironolactone (ALDACTONE) 25 MG tablet [Pharmacy Med Name: SPIRONOLACTONE 25 MG TABLET] 90 tablet 1     Sig: TAKE 1 TABLET BY MOUTH DAILY      Last office visit with prescribing clinician: 10/31/2024   Last telemedicine visit with prescribing clinician: Visit date not found   Next office visit with prescribing clinician: Visit date not found        Basic Metabolic Panel (07/30/2024 14:28)                  Would you like a call back once the refill request has been completed: [] Yes [] No    If the office needs to give you a call back, can they leave a voicemail: [] Yes [] No    Torie Manjarrez, RT  01/29/25, 08:28 EST

## 2025-01-30 ENCOUNTER — CLINICAL SUPPORT (OUTPATIENT)
Dept: FAMILY MEDICINE CLINIC | Facility: CLINIC | Age: 60
End: 2025-01-30
Payer: MEDICARE

## 2025-01-30 DIAGNOSIS — I10 PRIMARY HYPERTENSION: ICD-10-CM

## 2025-01-30 DIAGNOSIS — Z13.220 SCREENING FOR HYPERLIPIDEMIA: ICD-10-CM

## 2025-01-30 DIAGNOSIS — E67.3 HYPERVITAMINOSIS D: ICD-10-CM

## 2025-01-30 PROCEDURE — 82306 VITAMIN D 25 HYDROXY: CPT | Performed by: FAMILY MEDICINE

## 2025-01-30 PROCEDURE — 80053 COMPREHEN METABOLIC PANEL: CPT | Performed by: FAMILY MEDICINE

## 2025-01-30 PROCEDURE — 36415 COLL VENOUS BLD VENIPUNCTURE: CPT | Performed by: FAMILY MEDICINE

## 2025-01-30 PROCEDURE — 80061 LIPID PANEL: CPT | Performed by: FAMILY MEDICINE

## 2025-01-30 NOTE — PROGRESS NOTES
Venipuncture performed in R ARM by RT Katie  with good hemostasis. Patient tolerated well. 01/30/25 Torie Manjarrez, RT

## 2025-01-31 LAB
25(OH)D3 SERPL-MCNC: 52.3 NG/ML (ref 30–100)
ALBUMIN SERPL-MCNC: 3.8 G/DL (ref 3.5–5.2)
ALBUMIN/GLOB SERPL: 1.1 G/DL
ALP SERPL-CCNC: 110 U/L (ref 39–117)
ALT SERPL W P-5'-P-CCNC: 17 U/L (ref 1–33)
ANION GAP SERPL CALCULATED.3IONS-SCNC: 14.8 MMOL/L (ref 5–15)
AST SERPL-CCNC: 21 U/L (ref 1–32)
BILIRUB SERPL-MCNC: 0.3 MG/DL (ref 0–1.2)
BUN SERPL-MCNC: 21 MG/DL (ref 8–23)
BUN/CREAT SERPL: 15.6 (ref 7–25)
CALCIUM SPEC-SCNC: 9.5 MG/DL (ref 8.6–10.5)
CHLORIDE SERPL-SCNC: 107 MMOL/L (ref 98–107)
CHOLEST SERPL-MCNC: 209 MG/DL (ref 0–200)
CO2 SERPL-SCNC: 20.2 MMOL/L (ref 22–29)
CREAT SERPL-MCNC: 1.35 MG/DL (ref 0.57–1)
EGFRCR SERPLBLD CKD-EPI 2021: 45.1 ML/MIN/1.73
GLOBULIN UR ELPH-MCNC: 3.5 GM/DL
GLUCOSE SERPL-MCNC: 141 MG/DL (ref 65–99)
HDLC SERPL-MCNC: 66 MG/DL (ref 40–60)
LDLC SERPL CALC-MCNC: 130 MG/DL (ref 0–100)
LDLC/HDLC SERPL: 1.94 {RATIO}
POTASSIUM SERPL-SCNC: 4 MMOL/L (ref 3.5–5.2)
PROT SERPL-MCNC: 7.3 G/DL (ref 6–8.5)
SODIUM SERPL-SCNC: 142 MMOL/L (ref 136–145)
TRIGL SERPL-MCNC: 75 MG/DL (ref 0–150)
VLDLC SERPL-MCNC: 13 MG/DL (ref 5–40)

## 2025-02-06 ENCOUNTER — OFFICE VISIT (OUTPATIENT)
Dept: FAMILY MEDICINE CLINIC | Facility: CLINIC | Age: 60
End: 2025-02-06
Payer: MEDICARE

## 2025-02-06 VITALS
WEIGHT: 247 LBS | HEART RATE: 90 BPM | SYSTOLIC BLOOD PRESSURE: 113 MMHG | OXYGEN SATURATION: 94 % | BODY MASS INDEX: 37.44 KG/M2 | HEIGHT: 68 IN | RESPIRATION RATE: 20 BRPM | DIASTOLIC BLOOD PRESSURE: 73 MMHG | TEMPERATURE: 97.7 F

## 2025-02-06 DIAGNOSIS — Z23 IMMUNIZATION DUE: Primary | ICD-10-CM

## 2025-02-06 DIAGNOSIS — R73.03 PREDIABETES: ICD-10-CM

## 2025-02-06 DIAGNOSIS — E66.812 CLASS 2 SEVERE OBESITY DUE TO EXCESS CALORIES WITH SERIOUS COMORBIDITY AND BODY MASS INDEX (BMI) OF 36.0 TO 36.9 IN ADULT: ICD-10-CM

## 2025-02-06 DIAGNOSIS — E66.01 CLASS 2 SEVERE OBESITY DUE TO EXCESS CALORIES WITH SERIOUS COMORBIDITY AND BODY MASS INDEX (BMI) OF 36.0 TO 36.9 IN ADULT: ICD-10-CM

## 2025-02-06 LAB
EXPIRATION DATE: ABNORMAL
HBA1C MFR BLD: 6.2 % (ref 4.5–5.7)
Lab: ABNORMAL

## 2025-02-06 RX ORDER — MONTELUKAST SODIUM 10 MG/1
10 TABLET ORAL DAILY
Qty: 90 TABLET | Refills: 2 | Status: SHIPPED | OUTPATIENT
Start: 2025-02-06

## 2025-02-06 RX ORDER — TOPIRAMATE 25 MG/1
25 TABLET, FILM COATED ORAL DAILY
Qty: 90 TABLET | Refills: 1 | Status: SHIPPED | OUTPATIENT
Start: 2025-02-06

## 2025-02-06 RX ORDER — PHENTERMINE HYDROCHLORIDE 15 MG/1
15 CAPSULE ORAL EVERY MORNING
Qty: 30 CAPSULE | Refills: 0 | Status: SHIPPED | OUTPATIENT
Start: 2025-02-06

## 2025-02-06 NOTE — PROGRESS NOTES
Subjective   Nicki Nielsen is a 59 y.o. female.     Chief Complaint   Patient presents with    Results     Discuss lab results     Weight Check    Hypertension         Current Outpatient Medications:     albuterol (PROVENTIL) (2.5 MG/3ML) 0.083% nebulizer solution, Take 2.5 mg by nebulization Every 4 (Four) Hours As Needed for Wheezing., Disp: 120 each, Rfl: 0    albuterol sulfate  (90 Base) MCG/ACT inhaler, Inhale 2 puffs Every 4 (Four) Hours As Needed for Wheezing., Disp: 18 g, Rfl: 0    dextromethorphan-guaifenesin (MUCINEX DM MAX STRENGTH)  MG per 12 hr tablet, Take 1 tablet by mouth 2 (Two) Times a Day As Needed., Disp: , Rfl:     Diclofenac Sodium (VOLTAREN) 1 % gel gel, Apply 4 g topically to the appropriate area as directed 4 (Four) Times a Day As Needed (pain)., Disp: 500 g, Rfl: 11    dilTIAZem XR (DILACOR XR) 120 MG 24 hr capsule, TAKE ONE CAPSULE BY MOUTH ONCE NIGHTLY, Disp: 90 capsule, Rfl: 0    EPINEPHrine (EPIPEN) 0.3 MG/0.3ML solution auto-injector injection, Inject 0.3 mL under the skin into the appropriate area as directed As Needed (allergic reaction)., Disp: 1 each, Rfl: 0    fluticasone (FLONASE) 50 MCG/ACT nasal spray, SPRAY 2 SPRAYS IN EACH NOSTRIL ONCE DAILY, Disp: 16 g, Rfl: 3    HYDROcodone-acetaminophen (Norco)  MG per tablet, Take 1 tablet by mouth 2 (Two) Times a Day As Needed for Moderate Pain., Disp: 60 tablet, Rfl: 0    HYDROcodone-acetaminophen (Norco)  MG per tablet, Take 1 tablet by mouth 2 (Two) Times a Day As Needed for Moderate Pain., Disp: 60 tablet, Rfl: 0    HYDROcodone-acetaminophen (Norco)  MG per tablet, Take 1 tablet by mouth 2 (Two) Times a Day As Needed for Moderate Pain., Disp: 60 tablet, Rfl: 0    ibuprofen (ADVIL,MOTRIN) 800 MG tablet, TAKE ONE TABLET BY MOUTH EVERY 8 HOURS AS NEEDED FOR MODERATE PAIN OR HEADACHE, Disp: 90 tablet, Rfl: 0    loratadine (CLARITIN) 10 MG tablet, Take 1 tablet by mouth Daily., Disp: , Rfl:      montelukast (SINGULAIR) 10 MG tablet, Take 1 tablet by mouth Daily., Disp: 90 tablet, Rfl: 2    naloxone (NARCAN) 4 MG/0.1ML nasal spray, Call 911. Don't prime. [place] in 1 nostril for overdose. Repeat in 2-3 minutes in other nostril if no or minimal breathing/responsiveness., Disp: 2 each, Rfl: 0    olopatadine (PATADAY) 0.2 % solution ophthalmic solution, Administer 1 drop to both eyes Daily., Disp: 1 each, Rfl: 3    phentermine 15 MG capsule, TAKE 1 CAPSULE BY MOUTH EVERY MORNING, Disp: 30 capsule, Rfl: 0    polyethylene glycol (MIRALAX) 17 GM/SCOOP powder, Take 17 g by mouth Daily., Disp: 1530 g, Rfl: 1    Rimegepant Sulfate (Nurtec) 75 MG tablet dispersible tablet, Take 1 tablet by mouth Daily As Needed (Migraine)., Disp: 9 tablet, Rfl: 0    spironolactone (ALDACTONE) 25 MG tablet, TAKE 1 TABLET BY MOUTH DAILY, Disp: 90 tablet, Rfl: 1    tiZANidine (ZANAFLEX) 4 MG tablet, Take 1 tablet by mouth Every 8 (Eight) Hours As Needed for Muscle Spasms., Disp: 90 tablet, Rfl: 11    topiramate (TOPAMAX) 25 MG tablet, Take 1 tablet by mouth Daily., Disp: 90 tablet, Rfl: 1    Past Medical History:   Diagnosis Date    ADHD     Allergic     Asthma     Cervical radiculopathy     Chronic pain disorder 2020    Neck and left arm    COPD     DDD, cervical     DEXA     = (-0.7/ -1.4)    Extremity pain     Fibromyalgia     Hypertension     Joint pain 2020    Neck and left arm hands    Left shoulder pain     Low back pain     MAMMO     NEG = 2020/ 2024    Migraine headache     Myalgia     Neck pain     Obesity     Osteopenia     PAP     ASCUS = 2019/ NEG =        Past Surgical History:   Procedure Laterality Date     SECTION      x 1    COLONOSCOPY      COLOGUARD ---NEG = 2022, rech     EPIDURAL BLOCK      LYMPH NODE DISSECTION      cervical node= NEG    TUBAL ABDOMINAL LIGATION         Family History   Problem Relation Age of Onset    Heart disease Mother     Osteoporosis Mother      Aortic aneurysm Mother     Hypertension Father     Kidney disease Father         KIDNEY STONES    Stroke Father     Heart disease Father     Anemia Sister     Arthritis Brother     Diabetes Brother     Heart disease Brother     Hypertension Brother     Heart disease Maternal Grandmother     Aortic aneurysm Maternal Grandmother        Social History     Socioeconomic History    Marital status:    Tobacco Use    Smoking status: Every Day     Current packs/day: 1.00     Average packs/day: 1 pack/day for 30.0 years (30.0 ttl pk-yrs)     Types: Cigarettes     Passive exposure: Current    Smokeless tobacco: Never   Vaping Use    Vaping status: Some Days    Substances: CBD, ecig   Substance and Sexual Activity    Alcohol use: Yes     Comment: occ    Drug use: No    Sexual activity: Not Currently     Partners: Male     Birth control/protection: Tubal ligation, Surgical       History of Present Illness  The patient is a 5-year-old female who presents for a 3-month follow-up on weight loss medication, hypertension, COPD, prediabetes, and health maintenance.    She has been experiencing difficulty in losing weight, with a slight increase in her weight during the holiday season. She attributes this to her inability to engage in outdoor activities due to the cold weather, which exacerbates her pain. She attempts to maintain physical activity by walking indoors during the winter months. She has expressed interest in discussing potential weight loss strategies.    She reports no symptoms of hypotension. She has not received the influenza vaccine due to a history of viola influenza following vaccination. She has not received the COVID-19 vaccine. She is considering receiving the pneumonia vaccine.     She has been managing her blood pressure effectively. She has not undergone any recent surgical procedures. Her last Pap smear was conducted in 2020, and she has not had a hysterectomy. She prefers to have her Pap smears  performed at this facility. She was scheduled for a colonoscopy this year but has opted for Cologuard instead. She typically receives a letter prompting her to schedule a mammogram, with the most recent one performed in 2024. She has not yet received a letter for her next mammogram.    She has been experiencing congestion and battling a sinus infection, which often progresses to her chest. She does not require a refill of her rescue inhaler at this time. She takes Singulair throughout the year and is nearing the end of her current supply. She has not refilled her allergy eye drops recently due to insurance coverage issues. She has been using artificial tears for dry eyes.    She has been adhering to a modified diet in hopes of improving her kidney function. She has been using [facility] for her medications and has found it cost-effective. She has a sufficient supply of Nurtec for her headaches. She has been receiving muscle relaxers from Dr. Thibodeaux as needed, approximately once a year. She takes half a dose of the muscle relaxer intermittently when experiencing severe pain. She has been using regular eye drops for dry eyes.    She has been experiencing mild diarrhea but reports no nausea or vomiting. She has declined the use of metformin for her blood glucose management.    SOCIAL HISTORY  She does not smoke.    FAMILY HISTORY  Her oldest brother is , possibly due to a heart attack or drug overdose, and had heart damage likely from drug overdoses. He was not an alcoholic and did not take medicine for blood pressure. He was hospitalized about a year ago for a valve replacement. He was 63 years old when he passed away.    ALLERGIES  She is allergic to DULERA, NUTS, EGGPLANT, ACE INHIBITORS, ADVAIR, ANGIOTENSIN RECEPTOR BLOCKERS, CLONIDINE, DYAZIDE, MORPHINE, and AMLODIPINE.    MEDICATIONS  Current: Albuterol, Voltaren gel, Cardizem long-acting 120 mg, EpiPen, Flonase, Norco 10 mg, ibuprofen 800 mg, Claritin,  Singulair, Narcan, Pataday eye drops, phentermine 15 mg, MiraLAX, Nurtec 75 mg, spironolactone 25 mg, Zanaflex, Topamax 25 mg.    IMMUNIZATIONS  She has not received the influenza vaccine due to a history of viola influenza following vaccination. She has not received the COVID-19 vaccine. She is considering receiving the pneumonia vaccine.        The following portions of the patient's history were reviewed and updated as appropriate: allergies, current medications, past family history, past medical history, past social history, past surgical history and problem list.    Review of Systems   Constitutional:  Positive for activity change, appetite change and unexpected weight gain. Negative for unexpected weight loss.   HENT:  Positive for congestion and sinus pressure.    Eyes:  Negative for blurred vision, double vision, pain and visual disturbance.   Respiratory:  Negative for cough, shortness of breath and wheezing.    Cardiovascular:  Negative for leg swelling.   Gastrointestinal:  Positive for diarrhea. Negative for abdominal distention, abdominal pain, constipation, nausea and vomiting.   Endocrine: Negative for polydipsia, polyphagia and polyuria.   Genitourinary:  Negative for frequency.   Musculoskeletal:  Negative for gait problem.   Skin:  Negative for color change, dry skin, rash and skin lesions.   Allergic/Immunologic: Positive for environmental allergies.   Neurological:  Positive for headache. Negative for weakness and numbness.       Vitals:    02/06/25 1404   BP: 113/73   Pulse: 90   Resp: 20   Temp: 97.7 °F (36.5 °C)   SpO2: 94%       Objective   Physical Exam  Vitals and nursing note reviewed.   Constitutional:       General: She is not in acute distress.     Appearance: She is well-developed. She is obese. She is not ill-appearing or toxic-appearing.   HENT:      Head: Normocephalic and atraumatic.   Cardiovascular:      Rate and Rhythm: Normal rate and regular rhythm.      Heart sounds:  Normal heart sounds. No murmur heard.  Pulmonary:      Effort: Pulmonary effort is normal. No respiratory distress.      Breath sounds: Normal breath sounds. No wheezing, rhonchi or rales.   Skin:     General: Skin is warm and dry.      Findings: No rash.   Neurological:      Mental Status: She is alert and oriented to person, place, and time.      Cranial Nerves: No cranial nerve deficit.   Psychiatric:         Attention and Perception: Attention and perception normal.         Mood and Affect: Mood and affect normal.         Speech: Speech normal.         Behavior: Behavior normal. Behavior is cooperative.         Thought Content: Thought content normal.         Cognition and Memory: Cognition and memory normal.         Judgment: Judgment normal.       Physical Exam  Vital Signs  Blood pressure is 113/73. Weight is 247.            Assessment & Plan   There are no diagnoses linked to this encounter.  Assessment & Plan  1. Hypertension.  Her blood pressure readings have been consistently within the normal range. She will continue her current medication regimen.    2. Chronic Obstructive Pulmonary Disease (COPD).  She reports no need for a rescue inhaler at this time. She will continue her current medications, including Singulair daily and Flonase as needed. A prescription for Singulair will be provided.    3. Weight management.  Her weight has increased slightly to 247 lbs. She is advised to resume her weight loss efforts, including regular exercise and dietary modifications.    4. Prediabetes.  Her fasting blood glucose level was recorded at 141, indicating a need for further evaluation. Her A1c level has increased from 5.4 in 2023 to 6.2, placing her in the prediabetic range. She is advised to continue dietary modifications and regular exercise. An A1c test will be conducted today. A prescription for Topamax will be provided. Her A1c levels will be monitored every 3 months.    5. Health Maintenance.  She is advised  to receive the pneumonia vaccine. She is also encouraged to consider the COVID-19 vaccine, which is available at her local pharmacy. A Pap smear is due and will be conducted at this facility. A Cologuard test will be scheduled for October 2024. A mammogram will be scheduled for April 2025.     Results  Laboratory Studies  Fasting blood sugar is 141. HDL cholesterol is over 60. Total cholesterol and LDL increased slightly. Vitamin D levels are good. A1c is 6.2.          Patient or patient representative verbalized consent for the use of Ambient Listening during the visit with  Dea Mcnally DO for chart documentation. 2/6/2025  14:37 EST

## 2025-02-06 NOTE — PROGRESS NOTES
Fingerstick performed in right middle finger by Akanksha Salomon CMA  with good hemostasis. Patient tolerated well. 02/06/25 Akanksha Salomon CMA

## 2025-03-20 DIAGNOSIS — E66.812 CLASS 2 SEVERE OBESITY DUE TO EXCESS CALORIES WITH SERIOUS COMORBIDITY AND BODY MASS INDEX (BMI) OF 36.0 TO 36.9 IN ADULT: ICD-10-CM

## 2025-03-20 DIAGNOSIS — E66.01 CLASS 2 SEVERE OBESITY DUE TO EXCESS CALORIES WITH SERIOUS COMORBIDITY AND BODY MASS INDEX (BMI) OF 36.0 TO 36.9 IN ADULT: ICD-10-CM

## 2025-03-20 RX ORDER — IBUPROFEN 800 MG/1
800 TABLET, FILM COATED ORAL EVERY 8 HOURS PRN
Qty: 90 TABLET | Refills: 0 | Status: SHIPPED | OUTPATIENT
Start: 2025-03-20

## 2025-03-20 RX ORDER — PHENTERMINE HYDROCHLORIDE 15 MG/1
15 CAPSULE ORAL EVERY MORNING
Qty: 30 CAPSULE | Refills: 0 | Status: SHIPPED | OUTPATIENT
Start: 2025-03-20

## 2025-03-20 NOTE — TELEPHONE ENCOUNTER
INSPECT RAN  DUPLICATE    Rx Refill Note  Requested Prescriptions     Pending Prescriptions Disp Refills    ibuprofen (ADVIL,MOTRIN) 800 MG tablet 90 tablet 0    phentermine 15 MG capsule 30 capsule 0     Sig: Take 1 capsule by mouth Every Morning.      Last office visit with prescribing clinician: 2/6/2025   Last telemedicine visit with prescribing clinician: Visit date not found   Next office visit with prescribing clinician: 5/6/2025        Lipid Panel (01/30/2025 11:09)                  Would you like a call back once the refill request has been completed: [] Yes [] No    If the office needs to give you a call back, can they leave a voicemail: [] Yes [] No    Torie Manjarrez, RT  03/20/25, 11:25 EDT

## 2025-03-31 RX ORDER — DILTIAZEM HYDROCHLORIDE 120 MG/1
120 CAPSULE, EXTENDED RELEASE ORAL NIGHTLY
Qty: 90 CAPSULE | Refills: 0 | Status: SHIPPED | OUTPATIENT
Start: 2025-03-31

## 2025-04-15 ENCOUNTER — OFFICE VISIT (OUTPATIENT)
Dept: PAIN MEDICINE | Facility: CLINIC | Age: 60
End: 2025-04-15
Payer: MEDICARE

## 2025-04-15 VITALS
OXYGEN SATURATION: 94 % | RESPIRATION RATE: 16 BRPM | HEART RATE: 83 BPM | WEIGHT: 245.7 LBS | BODY MASS INDEX: 37.37 KG/M2 | DIASTOLIC BLOOD PRESSURE: 77 MMHG | SYSTOLIC BLOOD PRESSURE: 135 MMHG

## 2025-04-15 DIAGNOSIS — M79.10 MYALGIA: ICD-10-CM

## 2025-04-15 DIAGNOSIS — M54.2 CERVICALGIA: ICD-10-CM

## 2025-04-15 DIAGNOSIS — G89.29 CHRONIC BILATERAL LOW BACK PAIN WITH SCIATICA, SCIATICA LATERALITY UNSPECIFIED: Primary | ICD-10-CM

## 2025-04-15 DIAGNOSIS — M54.6 CHRONIC BILATERAL THORACIC BACK PAIN: ICD-10-CM

## 2025-04-15 DIAGNOSIS — M79.7 FIBROMYALGIA: ICD-10-CM

## 2025-04-15 DIAGNOSIS — M54.42 CHRONIC BILATERAL LOW BACK PAIN WITH SCIATICA, SCIATICA LATERALITY UNSPECIFIED: Primary | ICD-10-CM

## 2025-04-15 DIAGNOSIS — G43.C0 PERIODIC HEADACHE SYNDROME, NOT INTRACTABLE: ICD-10-CM

## 2025-04-15 DIAGNOSIS — M48.02 CERVICAL SPINAL STENOSIS: ICD-10-CM

## 2025-04-15 DIAGNOSIS — G89.29 CHRONIC BILATERAL THORACIC BACK PAIN: ICD-10-CM

## 2025-04-15 DIAGNOSIS — M54.12 CERVICAL RADICULOPATHY: ICD-10-CM

## 2025-04-15 DIAGNOSIS — G89.4 CHRONIC PAIN DISORDER: ICD-10-CM

## 2025-04-15 DIAGNOSIS — M54.41 CHRONIC BILATERAL LOW BACK PAIN WITH SCIATICA, SCIATICA LATERALITY UNSPECIFIED: Primary | ICD-10-CM

## 2025-04-15 PROCEDURE — 99214 OFFICE O/P EST MOD 30 MIN: CPT | Performed by: PHYSICAL MEDICINE & REHABILITATION

## 2025-04-15 PROCEDURE — 1125F AMNT PAIN NOTED PAIN PRSNT: CPT | Performed by: PHYSICAL MEDICINE & REHABILITATION

## 2025-04-15 PROCEDURE — 1159F MED LIST DOCD IN RCRD: CPT | Performed by: PHYSICAL MEDICINE & REHABILITATION

## 2025-04-15 PROCEDURE — G2211 COMPLEX E/M VISIT ADD ON: HCPCS | Performed by: PHYSICAL MEDICINE & REHABILITATION

## 2025-04-15 PROCEDURE — 3075F SYST BP GE 130 - 139MM HG: CPT | Performed by: PHYSICAL MEDICINE & REHABILITATION

## 2025-04-15 PROCEDURE — 3078F DIAST BP <80 MM HG: CPT | Performed by: PHYSICAL MEDICINE & REHABILITATION

## 2025-04-15 PROCEDURE — 1160F RVW MEDS BY RX/DR IN RCRD: CPT | Performed by: PHYSICAL MEDICINE & REHABILITATION

## 2025-04-15 RX ORDER — HYDROCODONE BITARTRATE AND ACETAMINOPHEN 10; 325 MG/1; MG/1
1 TABLET ORAL 2 TIMES DAILY PRN
Qty: 60 TABLET | Refills: 0 | Status: SHIPPED | OUTPATIENT
Start: 2025-04-15

## 2025-04-15 NOTE — PROGRESS NOTES
Subjective   Nicki Nielsen is a 59 y.o. female.     History of Present Illness  CC: neck and LBP    Neck and back pain, 10/10 at worst, 7/10 at best, began several decades ago, began treatment with me 7/2/19, always present, varies, aching, stabbing, worse with bending and lifting, interferes with ADLs, sleep, activity, failed PT, chiropractor, meds. MRI C-spine with DDD, stenosis. Saw Dr. Overton, had PT, given Norco 10mg TID prn, began receiving opioids from PCP, down to BID with less relief, PCP retiring, increased here to TID prn, then down to BID prn again. Failed injections, surgery in past. Back pain worse after housework, no b/b incontinence, no new weakness or numbness. Also c/o b/l thumb and great toe pain.  Back Pain  Pertinent negatives include no abdominal pain, bladder incontinence, chest pain, fever, numbness or weakness.        The following portions of the patient's history were reviewed and updated as appropriate: allergies, current medications, past family history, past medical history, past social history, past surgical history and problem list.    Review of Systems   Constitutional:  Positive for fatigue. Negative for chills and fever.   HENT:  Negative for hearing loss and trouble swallowing.    Eyes:  Negative for visual disturbance.   Respiratory:  Negative for shortness of breath.    Cardiovascular:  Negative for chest pain.   Gastrointestinal:  Positive for constipation and nausea. Negative for abdominal pain, diarrhea and vomiting.   Genitourinary:  Negative for urinary incontinence.   Musculoskeletal:  Positive for arthralgias, back pain, joint swelling and neck pain. Negative for myalgias.   Neurological:  Positive for dizziness and headache. Negative for weakness and numbness.       Objective   Physical Exam   Constitutional: She is oriented to person, place, and time. She appears well-developed and well-nourished.   HENT:   Head: Normocephalic and atraumatic.   Eyes: Pupils  are equal, round, and reactive to light.   Cardiovascular: Normal rate, regular rhythm and normal heart sounds.   Pulmonary/Chest: Breath sounds normal.   Abdominal: Soft. Bowel sounds are normal. She exhibits no distension. There is no abdominal tenderness.   Neurological: She is alert and oriented to person, place, and time. She has normal reflexes. She displays normal reflexes. No sensory deficit.   Psychiatric: Her behavior is normal. Thought content normal.         Assessment & Plan   Diagnoses and all orders for this visit:    1. Chronic bilateral low back pain with sciatica, sciatica laterality unspecified (Primary)    2. Chronic bilateral thoracic back pain    3. Chronic pain disorder    4. Fibromyalgia    5. Periodic headache syndrome, not intractable    6. Myalgia    7. Cervical radiculopathy    8. Cervical spinal stenosis    9. Cervicalgia      Inspect reviewed, in order. UDS 7/20/21 with THC, has been around people who use marijuana, repeat UDS in order 8/26/22, 6/29/23, 7/2/24.  Treatment plan will consist of continuing current medication as long as it remains effective and is necessary, while evaluating patient at each visit and determining if the medication can be lowered or discontinued, while also using nonopioid therapies to reduce reliance on opioids.  Increased to Norco 10mg QID prn, then reduced to TID prn, then BID prn, back to TID prn, now keeping her awake. Rotated to Percocet 7.5mg TID prn, felt poorly and slept poorly. Rotated back to Norco 10mg TID prn. Began Norco 10mg BD prn during the day, MS-Contin 15mg qHS but had breathing issues with morphine, stopped. Continue Norco 10mg BID prn. Filled 3/24/25.   Patient's pain is still well-managed by current medication regimen, is doing well at this strength and dosage, therefore I will continue to prescribe unchanged as the most appropriate course of treatment.  Began Tizanidine 4mg TID prn, refill.  Began Lyrica 50mg BID, cannot tolerate  Gabapentin.  Had bad reaction to Silenor 6mg qHS prn, stopped. May try OTC Melatonin.  Failed injections, surgery.  Insurance will not cover comp cream. Cont Voltaren.  Pt will ask PCP for referral to allergist for possible bee sting allergy.  RTC 3 months for f/u.                     Physical Exam  Constitutional:       Appearance: She is well-developed.   HENT:      Head: Normocephalic and atraumatic.   Eyes:      Pupils: Pupils are equal, round, and reactive to light.   Cardiovascular:      Rate and Rhythm: Normal rate and regular rhythm.      Heart sounds: Normal heart sounds.   Pulmonary:      Breath sounds: Normal breath sounds.   Abdominal:      General: Bowel sounds are normal. There is no distension.      Palpations: Abdomen is soft.      Tenderness: There is no abdominal tenderness.   Neurological:      Mental Status: She is alert and oriented to person, place, and time.      Sensory: No sensory deficit.      Deep Tendon Reflexes: Reflexes are normal and symmetric. Reflexes normal.   Psychiatric:         Behavior: Behavior normal.         Thought Content: Thought content normal.

## 2025-04-19 DIAGNOSIS — E66.812 CLASS 2 SEVERE OBESITY DUE TO EXCESS CALORIES WITH SERIOUS COMORBIDITY AND BODY MASS INDEX (BMI) OF 36.0 TO 36.9 IN ADULT: ICD-10-CM

## 2025-04-19 DIAGNOSIS — E66.01 CLASS 2 SEVERE OBESITY DUE TO EXCESS CALORIES WITH SERIOUS COMORBIDITY AND BODY MASS INDEX (BMI) OF 36.0 TO 36.9 IN ADULT: ICD-10-CM

## 2025-04-21 RX ORDER — PHENTERMINE HYDROCHLORIDE 15 MG/1
15 CAPSULE ORAL EVERY MORNING
Qty: 30 CAPSULE | Refills: 0 | Status: SHIPPED | OUTPATIENT
Start: 2025-04-21

## 2025-04-21 NOTE — TELEPHONE ENCOUNTER
INSPECT [facility]    Rx Refill Note  Requested Prescriptions     Pending Prescriptions Disp Refills    phentermine 15 MG capsule [Pharmacy Med Name: PHENTERMINE 15 MG CAPSULE] 30 capsule      Sig: TAKE 1 CAPSULE BY MOUTH EVERY MORNING      Last office visit with prescribing clinician: 2/6/2025   Last telemedicine visit with prescribing clinician: Visit date not found   Next office visit with prescribing clinician: 5/6/2025                         Would you like a call back once the refill request has been completed: [] Yes [] No    If the office needs to give you a call back, can they leave a voicemail: [] Yes [] No    Torie Manjarrez, RT  04/21/25, 10:24 EDT

## 2025-05-06 ENCOUNTER — OFFICE VISIT (OUTPATIENT)
Dept: FAMILY MEDICINE CLINIC | Facility: CLINIC | Age: 60
End: 2025-05-06
Payer: MEDICARE

## 2025-05-06 ENCOUNTER — TELEPHONE (OUTPATIENT)
Dept: FAMILY MEDICINE CLINIC | Facility: CLINIC | Age: 60
End: 2025-05-06

## 2025-05-06 VITALS
WEIGHT: 240.8 LBS | BODY MASS INDEX: 36.49 KG/M2 | RESPIRATION RATE: 20 BRPM | DIASTOLIC BLOOD PRESSURE: 74 MMHG | HEIGHT: 68 IN | SYSTOLIC BLOOD PRESSURE: 120 MMHG | TEMPERATURE: 97.7 F | HEART RATE: 92 BPM | OXYGEN SATURATION: 97 %

## 2025-05-06 DIAGNOSIS — J43.1 PANLOBULAR EMPHYSEMA: Chronic | ICD-10-CM

## 2025-05-06 DIAGNOSIS — E66.812 CLASS 2 SEVERE OBESITY DUE TO EXCESS CALORIES WITH SERIOUS COMORBIDITY AND BODY MASS INDEX (BMI) OF 35.0 TO 35.9 IN ADULT: Primary | Chronic | ICD-10-CM

## 2025-05-06 DIAGNOSIS — I10 PRIMARY HYPERTENSION: Chronic | ICD-10-CM

## 2025-05-06 DIAGNOSIS — N28.9 RENAL INSUFFICIENCY: ICD-10-CM

## 2025-05-06 DIAGNOSIS — R73.03 PREDIABETES: ICD-10-CM

## 2025-05-06 DIAGNOSIS — E66.01 CLASS 2 SEVERE OBESITY DUE TO EXCESS CALORIES WITH SERIOUS COMORBIDITY AND BODY MASS INDEX (BMI) OF 35.0 TO 35.9 IN ADULT: Primary | Chronic | ICD-10-CM

## 2025-05-06 PROCEDURE — 1160F RVW MEDS BY RX/DR IN RCRD: CPT | Performed by: FAMILY MEDICINE

## 2025-05-06 PROCEDURE — 1125F AMNT PAIN NOTED PAIN PRSNT: CPT | Performed by: FAMILY MEDICINE

## 2025-05-06 PROCEDURE — 3074F SYST BP LT 130 MM HG: CPT | Performed by: FAMILY MEDICINE

## 2025-05-06 PROCEDURE — 36415 COLL VENOUS BLD VENIPUNCTURE: CPT | Performed by: FAMILY MEDICINE

## 2025-05-06 PROCEDURE — 99214 OFFICE O/P EST MOD 30 MIN: CPT | Performed by: FAMILY MEDICINE

## 2025-05-06 PROCEDURE — 1159F MED LIST DOCD IN RCRD: CPT | Performed by: FAMILY MEDICINE

## 2025-05-06 PROCEDURE — 3078F DIAST BP <80 MM HG: CPT | Performed by: FAMILY MEDICINE

## 2025-05-06 PROCEDURE — 80048 BASIC METABOLIC PNL TOTAL CA: CPT | Performed by: FAMILY MEDICINE

## 2025-05-06 PROCEDURE — 83036 HEMOGLOBIN GLYCOSYLATED A1C: CPT | Performed by: FAMILY MEDICINE

## 2025-05-06 RX ORDER — DILTIAZEM HYDROCHLORIDE 120 MG/1
120 CAPSULE, EXTENDED RELEASE ORAL NIGHTLY
Qty: 90 CAPSULE | Refills: 0 | Status: SHIPPED | OUTPATIENT
Start: 2025-05-06

## 2025-05-06 RX ORDER — ALBUTEROL SULFATE 90 UG/1
2 INHALANT RESPIRATORY (INHALATION) EVERY 4 HOURS PRN
Qty: 18 G | Refills: 0 | Status: SHIPPED | OUTPATIENT
Start: 2025-05-06

## 2025-05-06 RX ORDER — AMOXICILLIN 875 MG/1
875 TABLET, COATED ORAL 2 TIMES DAILY
Qty: 20 TABLET | Refills: 0 | Status: SHIPPED | OUTPATIENT
Start: 2025-05-06

## 2025-05-06 RX ORDER — FLUTICASONE PROPIONATE 50 MCG
2 SPRAY, SUSPENSION (ML) NASAL DAILY
Qty: 16 G | Refills: 3 | Status: SHIPPED | OUTPATIENT
Start: 2025-05-06

## 2025-05-06 NOTE — TELEPHONE ENCOUNTER
ZEPBOUND - PA SENT TO PLAN. AWAITING RESPONSE    Nicki Marroquin (Key: [id number])  Rx #: [id number]  Zepbound 2.5MG/0.5ML pen-injectors

## 2025-05-06 NOTE — PROGRESS NOTES
Subjective   Nicki Nielsen is a 59 y.o. female.     Chief Complaint   Patient presents with    Weight Check    COPD    Migraine         Current Outpatient Medications:     albuterol (PROVENTIL) (2.5 MG/3ML) 0.083% nebulizer solution, Take 2.5 mg by nebulization Every 4 (Four) Hours As Needed for Wheezing., Disp: 120 each, Rfl: 0    albuterol sulfate  (90 Base) MCG/ACT inhaler, Inhale 2 puffs Every 4 (Four) Hours As Needed for Wheezing., Disp: 18 g, Rfl: 0    dextromethorphan-guaifenesin (MUCINEX DM MAX STRENGTH)  MG per 12 hr tablet, Take 1 tablet by mouth 2 (Two) Times a Day As Needed., Disp: , Rfl:     Diclofenac Sodium (VOLTAREN) 1 % gel gel, Apply 4 g topically to the appropriate area as directed 4 (Four) Times a Day As Needed (pain)., Disp: 500 g, Rfl: 11    dilTIAZem XR (DILACOR XR) 120 MG 24 hr capsule, TAKE ONE CAPSULE BY MOUTH ONCE NIGHTLY, Disp: 90 capsule, Rfl: 0    EPINEPHrine (EPIPEN) 0.3 MG/0.3ML solution auto-injector injection, Inject 0.3 mL under the skin into the appropriate area as directed As Needed (allergic reaction)., Disp: 1 each, Rfl: 0    fluticasone (FLONASE) 50 MCG/ACT nasal spray, SPRAY 2 SPRAYS IN EACH NOSTRIL ONCE DAILY, Disp: 16 g, Rfl: 3    HYDROcodone-acetaminophen (Norco)  MG per tablet, Take 1 tablet by mouth 2 (Two) Times a Day As Needed for Moderate Pain., Disp: 60 tablet, Rfl: 0    ibuprofen (ADVIL,MOTRIN) 800 MG tablet, Take 1 tablet by mouth Every 8 (Eight) Hours As Needed for Mild Pain., Disp: 90 tablet, Rfl: 0    loratadine (CLARITIN) 10 MG tablet, Take 1 tablet by mouth Daily., Disp: , Rfl:     montelukast (SINGULAIR) 10 MG tablet, Take 1 tablet by mouth Daily., Disp: 90 tablet, Rfl: 2    naloxone (NARCAN) 4 MG/0.1ML nasal spray, Call 911. Don't prime. [place] in 1 nostril for overdose. Repeat in 2-3 minutes in other nostril if no or minimal breathing/responsiveness., Disp: 2 each, Rfl: 0    olopatadine (PATADAY) 0.2 % solution ophthalmic  solution, Administer 1 drop to both eyes Daily., Disp: 1 each, Rfl: 3    phentermine 15 MG capsule, TAKE 1 CAPSULE BY MOUTH EVERY MORNING, Disp: 30 capsule, Rfl: 0    polyethylene glycol (MIRALAX) 17 GM/SCOOP powder, Take 17 g by mouth Daily., Disp: 1530 g, Rfl: 1    Rimegepant Sulfate (Nurtec) 75 MG tablet dispersible tablet, Take 1 tablet by mouth Daily As Needed (Migraine)., Disp: 9 tablet, Rfl: 0    spironolactone (ALDACTONE) 25 MG tablet, TAKE 1 TABLET BY MOUTH DAILY, Disp: 90 tablet, Rfl: 1    tiZANidine (ZANAFLEX) 4 MG tablet, Take 1 tablet by mouth Every 8 (Eight) Hours As Needed for Muscle Spasms., Disp: 90 tablet, Rfl: 11    topiramate (TOPAMAX) 25 MG tablet, Take 1 tablet by mouth Daily., Disp: 90 tablet, Rfl: 1    HYDROcodone-acetaminophen (Norco)  MG per tablet, Take 1 tablet by mouth 2 (Two) Times a Day As Needed for Moderate Pain., Disp: 60 tablet, Rfl: 0    HYDROcodone-acetaminophen (Norco)  MG per tablet, Take 1 tablet by mouth 2 (Two) Times a Day As Needed for Moderate Pain., Disp: 60 tablet, Rfl: 0    Past Medical History:   Diagnosis Date    ADHD     Allergic     Asthma     Cervical radiculopathy     Chronic pain disorder 2020    Neck and left arm    COPD     DDD, cervical     DEXA     = (-0.7/ -1.4)    Extremity pain     Fibromyalgia     Hypertension     Joint pain 2020    Neck and left arm hands    Left shoulder pain     Low back pain     MAMMO     NEG = 2020/ 2024    Migraine headache     Myalgia     Neck pain     Obesity     Osteoarthritis     Osteopenia     PAP     ASCUS = 2019/ NEG =        Past Surgical History:   Procedure Laterality Date     SECTION      x 1    COLONOSCOPY      COLOGUARD ---NEG = 2022, rech     EPIDURAL BLOCK      LYMPH NODE DISSECTION      cervical node= NEG    TUBAL ABDOMINAL LIGATION         Family History   Problem Relation Age of Onset    Heart disease Mother     Osteoporosis Mother     Aortic aneurysm  Mother     Hypertension Father     Kidney disease Father         KIDNEY STONES    Stroke Father     Heart disease Father     Anemia Sister     Arthritis Brother     Diabetes Brother     Heart disease Brother     Hypertension Brother     Heart disease Brother     Drug abuse Brother     Heart disease Maternal Grandmother     Aortic aneurysm Maternal Grandmother        Social History     Socioeconomic History    Marital status:    Tobacco Use    Smoking status: Every Day     Current packs/day: 1.00     Average packs/day: 1 pack/day for 30.0 years (30.0 ttl pk-yrs)     Types: Cigarettes     Passive exposure: Current    Smokeless tobacco: Never   Vaping Use    Vaping status: Some Days    Substances: CBD, ecig   Substance and Sexual Activity    Alcohol use: Yes     Comment: occ    Drug use: No    Sexual activity: Not Currently     Partners: Male     Birth control/protection: Tubal ligation, Surgical       History of Present Illness  The patient is a 59-year-old female here for follow-up on weight loss medication, COPD, and migraine.    She has experienced a weight loss of 7 pounds, which she attributes to increased physical activity over the past month and a decreased appetite due to fatigue. She has been assisting her brother in cleaning out their mother's estate, including a ana company building, for the past 4.5 weeks. This task has resulted in an average of 5000 steps per day. She has confirmed with her insurance provider that they will cover the cost of weight loss injections for non-diabetics. She has expressed interest in trying Zepbound, having heard positive feedback from others regarding its efficacy and minimal side effects. She has not been diagnosed with sleep apnea and has never undergone testing for it.    She uses Flonase intermittently during severe allergy seasons but has found that continuous use reduces its effectiveness. She has been using a rescue inhaler, which she also used earlier  today, along with Mucinex. She continues to take Singulair and purchases Claritin over the counter. She has not recently used her allergy eye drops. She has sufficient nebulizer medication. She reports persistent sinus drainage, forehead and eye pain, and sinus pressure, leading her to suspect a sinus infection. She experiences postnasal drip but does not have a runny nose. She has been taking Mucinex, which has been beneficial as it prevents chest congestion. She has tried Zyrtec and Allegra without success. She has been coughing up cloudy, thick phlegm for the past week, which is difficult to expel. She used her inhaler earlier today, which provided some relief. She believes she may have tried a Medrol Dosepak in the past without significant benefit. She continues to smoke.    She takes Topamax once daily in the morning for headaches but reports that it causes mental fogginess. She has attempted to take it at night but found that it still resulted in next-day fogginess. She has expressed a desire to discontinue this medication. She has a few doses of Nurtec remaining.    She maintains hydration by sipping throughout the day and consumes tea, coffee, and water sweetened with Sugar Twin. She does not drink soda. She has not had an eye examination within the past year and acknowledges that she is due for one.    SOCIAL HISTORY  She admits to smoking.        The following portions of the patient's history were reviewed and updated as appropriate: allergies, current medications, past family history, past medical history, past social history, past surgical history and problem list.    Review of Systems   Constitutional:  Positive for activity change, appetite change and unexpected weight loss. Negative for chills, fatigue, fever and unexpected weight gain.   HENT:  Positive for postnasal drip and sinus pressure. Negative for congestion, ear discharge, ear pain, rhinorrhea, sneezing, sore throat and swollen glands.     Eyes:  Negative for pain, discharge, redness, itching and visual disturbance.   Respiratory:  Negative for cough, shortness of breath, wheezing and stridor.    Skin:  Negative for rash.   Allergic/Immunologic: Positive for environmental allergies.   Psychiatric/Behavioral:  Positive for stress. Negative for sleep disturbance.        Vitals:    05/06/25 1418   BP: 120/74   Pulse: 92   Resp: 20   Temp: 97.7 °F (36.5 °C)   SpO2: 97%       Objective   Physical Exam  Vitals and nursing note reviewed.   Constitutional:       General: She is not in acute distress.     Appearance: Normal appearance. She is well-developed and overweight. She is not ill-appearing or toxic-appearing.   HENT:      Head: Normocephalic and atraumatic.   Cardiovascular:      Rate and Rhythm: Normal rate and regular rhythm.      Heart sounds: Normal heart sounds. No murmur heard.  Pulmonary:      Effort: Pulmonary effort is normal. No respiratory distress.      Breath sounds: Normal breath sounds. No stridor. No wheezing, rhonchi or rales.   Musculoskeletal:         General: No tenderness or deformity.   Feet:      Right foot:      Skin integrity: No ulcer, blister or warmth.      Toenail Condition: ingrown     Left foot:      Skin integrity: No blister, warmth or callus.      Toenail Condition: ingrown  Skin:     General: Skin is warm and dry.      Capillary Refill: Capillary refill takes less than 2 seconds.      Findings: No erythema or rash.   Neurological:      Mental Status: She is alert and oriented to person, place, and time.      Cranial Nerves: No cranial nerve deficit.      Gait: Gait normal.   Psychiatric:         Attention and Perception: Attention normal.         Mood and Affect: Mood and affect normal.         Speech: Speech normal.         Behavior: Behavior normal. Behavior is cooperative.         Thought Content: Thought content normal.         Cognition and Memory: Cognition and memory normal.         Judgment: Judgment normal.        Physical Exam      Assessment & Plan   There are no diagnoses linked to this encounter.  Assessment & Plan  1. Weight management.  - BMI is currently at 36.  - Down seven more pounds since the last visit.  - A prescription for Zepbound will be issued, with the initial month serving as a trial period.  - Advised to continue Topamax regimen during this time; may discontinue at her discretion.    2. Chronic obstructive pulmonary disease (COPD).  - Reports using rescue inhaler more frequently due to increased physical activity and exposure to dust.  - Physical exam findings indicate chest congestion.  - Currently taking Singulair with refills available.  - Refill for rescue inhaler will be provided; advised to continue using Mucinex 1200 mg twice a day as needed.    3. Migraine.  - Currently taking Topamax 50 mg once a day for migraines but experiences cognitive side effects.  - May continue Topamax if it helps with headaches; can discontinue at any time.  - Has a couple of doses of Nurtec remaining for acute migraine attacks.  - Discussed the option of discontinuing Topamax next month when Zepbound dosage increases.    4. Sinus c/o's.  - Reports symptoms of sinus pressure, forehead and eye pain, and drainage down the back of her throat.  - Claritin is not effective for her.  - A prescription for Xyzal 5 mg will be provided as an alternative antihistamine.  - If symptoms persist, a low-dose steroid taper like Medrol Dosepak may be considered.    5. Health maintenance.  - Qualifies for the shingles vaccine due to her history of chickenpox.  - Advised to receive the shingles vaccine at the pharmacy.  - A fingerstick test will be conducted today to monitor blood sugar levels, previously recorded at 62.  - Additionally, A1c and BMP will be checked.     Results  Labs   - Cholesterol levels: High   - HDL cholesterol: Increased slightly   - LDL cholesterol: Increased slightly          Patient or patient representative  verbalized consent for the use of Ambient Listening during the visit with  Dea Mcnally DO for chart documentation. 5/26/2025  13:13 EDT

## 2025-05-06 NOTE — PROGRESS NOTES
Venipuncture performed in L ARM by RT Katie  with good hemostasis. Patient tolerated well. 05/06/25 Torie Manjarrez, RT

## 2025-05-07 ENCOUNTER — PRIOR AUTHORIZATION (OUTPATIENT)
Dept: FAMILY MEDICINE CLINIC | Facility: CLINIC | Age: 60
End: 2025-05-07
Payer: MEDICARE

## 2025-05-07 LAB
ANION GAP SERPL CALCULATED.3IONS-SCNC: 7.7 MMOL/L (ref 5–15)
BUN SERPL-MCNC: 26 MG/DL (ref 8–23)
BUN/CREAT SERPL: 29.9 (ref 7–25)
CALCIUM SPEC-SCNC: 10.1 MG/DL (ref 8.6–10.5)
CHLORIDE SERPL-SCNC: 106 MMOL/L (ref 98–107)
CO2 SERPL-SCNC: 22.3 MMOL/L (ref 22–29)
CREAT SERPL-MCNC: 0.87 MG/DL (ref 0.57–1)
EGFRCR SERPLBLD CKD-EPI 2021: 76.4 ML/MIN/1.73
GLUCOSE SERPL-MCNC: 116 MG/DL (ref 65–99)
HBA1C MFR BLD: 6.2 % (ref 4.8–5.6)
POTASSIUM SERPL-SCNC: 4.8 MMOL/L (ref 3.5–5.2)
SODIUM SERPL-SCNC: 136 MMOL/L (ref 136–145)

## 2025-05-07 RX ORDER — MONTELUKAST SODIUM 10 MG/1
10 TABLET ORAL DAILY
Qty: 30 TABLET | Refills: 5 | Status: SHIPPED | OUTPATIENT
Start: 2025-05-07

## 2025-05-07 NOTE — TELEPHONE ENCOUNTER
Denied:  Outcome  [facility]  Your plan provides up to a 34 day supply of this medication. A longer supply will not be allowed.

## 2025-05-07 NOTE — TELEPHONE ENCOUNTER
Zepbound Denied:    Outcome:  This drug is used for weight loss or weight gain purposes, which is one of the classes not   covered under the Part D coverage. We do not offer supplemental benefit that would cover this   drug. Section 1927(d)(2) of the Social Security Act (the Act) permits the exclusion of certain   drugs or classes of drugs from coverage under Part D.  The criteria we used to make this decision is the Centers of Medicare and Medicaid Services   (CMS) guidelines-Prescription Drug Benefit Manual (Chapter 6, 10.2 Covered Part D Drug).

## 2025-05-24 DIAGNOSIS — E66.812 CLASS 2 SEVERE OBESITY DUE TO EXCESS CALORIES WITH SERIOUS COMORBIDITY AND BODY MASS INDEX (BMI) OF 36.0 TO 36.9 IN ADULT: ICD-10-CM

## 2025-05-24 DIAGNOSIS — E66.01 CLASS 2 SEVERE OBESITY DUE TO EXCESS CALORIES WITH SERIOUS COMORBIDITY AND BODY MASS INDEX (BMI) OF 36.0 TO 36.9 IN ADULT: ICD-10-CM

## 2025-05-27 RX ORDER — PHENTERMINE HYDROCHLORIDE 15 MG/1
15 CAPSULE ORAL EVERY MORNING
Qty: 30 CAPSULE | Refills: 0 | Status: SHIPPED | OUTPATIENT
Start: 2025-05-27

## 2025-06-02 ENCOUNTER — OFFICE VISIT (OUTPATIENT)
Dept: FAMILY MEDICINE CLINIC | Facility: CLINIC | Age: 60
End: 2025-06-02
Payer: MEDICARE

## 2025-06-02 VITALS
BODY MASS INDEX: 35.77 KG/M2 | OXYGEN SATURATION: 99 % | WEIGHT: 236 LBS | RESPIRATION RATE: 20 BRPM | DIASTOLIC BLOOD PRESSURE: 74 MMHG | HEART RATE: 88 BPM | SYSTOLIC BLOOD PRESSURE: 116 MMHG | TEMPERATURE: 97.8 F | HEIGHT: 68 IN

## 2025-06-02 DIAGNOSIS — M25.561 ACUTE PAIN OF RIGHT KNEE: Primary | ICD-10-CM

## 2025-06-02 DIAGNOSIS — S80.01XD TRAUMATIC ECCHYMOSIS OF RIGHT KNEE, SUBSEQUENT ENCOUNTER: ICD-10-CM

## 2025-06-02 NOTE — PROGRESS NOTES
Subjective   Nicki Nielsen is a 59 y.o. female.     Chief Complaint   Patient presents with    Transitional Care Management     [facility] ER follow up on: Fell into pool, R leg got caught at knee, R leg pain, No LOC, did not hit head         Current Outpatient Medications:     albuterol (PROVENTIL) (2.5 MG/3ML) 0.083% nebulizer solution, Take 2.5 mg by nebulization Every 4 (Four) Hours As Needed for Wheezing., Disp: 120 each, Rfl: 0    albuterol sulfate  (90 Base) MCG/ACT inhaler, Inhale 2 puffs Every 4 (Four) Hours As Needed for Wheezing., Disp: 18 g, Rfl: 0    amoxicillin (AMOXIL) 875 MG tablet, Take 1 tablet by mouth 2 (Two) Times a Day., Disp: 20 tablet, Rfl: 0    dextromethorphan-guaifenesin (MUCINEX DM MAX STRENGTH)  MG per 12 hr tablet, Take 1 tablet by mouth 2 (Two) Times a Day As Needed., Disp: , Rfl:     Diclofenac Sodium (VOLTAREN) 1 % gel gel, Apply 4 g topically to the appropriate area as directed 4 (Four) Times a Day As Needed (pain)., Disp: 500 g, Rfl: 11    dilTIAZem XR (DILACOR XR) 120 MG 24 hr capsule, Take 1 capsule by mouth Every Night., Disp: 90 capsule, Rfl: 0    EPINEPHrine (EPIPEN) 0.3 MG/0.3ML solution auto-injector injection, Inject 0.3 mL under the skin into the appropriate area as directed As Needed (allergic reaction)., Disp: 1 each, Rfl: 0    fluticasone (FLONASE) 50 MCG/ACT nasal spray, Administer 2 sprays into the nostril(s) as directed by provider Daily., Disp: 16 g, Rfl: 3    HYDROcodone-acetaminophen (Norco)  MG per tablet, Take 1 tablet by mouth 2 (Two) Times a Day As Needed for Moderate Pain., Disp: 60 tablet, Rfl: 0    HYDROcodone-acetaminophen (Norco)  MG per tablet, Take 1 tablet by mouth 2 (Two) Times a Day As Needed for Moderate Pain., Disp: 60 tablet, Rfl: 0    HYDROcodone-acetaminophen (Norco)  MG per tablet, Take 1 tablet by mouth 2 (Two) Times a Day As Needed for Moderate Pain., Disp: 60 tablet, Rfl: 0    ibuprofen (ADVIL,MOTRIN)  800 MG tablet, Take 1 tablet by mouth Every 8 (Eight) Hours As Needed for Mild Pain., Disp: 90 tablet, Rfl: 0    loratadine (CLARITIN) 10 MG tablet, Take 1 tablet by mouth Daily., Disp: , Rfl:     montelukast (SINGULAIR) 10 MG tablet, Take 1 tablet by mouth Daily., Disp: 30 tablet, Rfl: 5    naloxone (NARCAN) 4 MG/0.1ML nasal spray, Call 911. Don't prime. [place] in 1 nostril for overdose. Repeat in 2-3 minutes in other nostril if no or minimal breathing/responsiveness., Disp: 2 each, Rfl: 0    olopatadine (PATADAY) 0.2 % solution ophthalmic solution, Administer 1 drop to both eyes Daily., Disp: 1 each, Rfl: 3    phentermine 15 MG capsule, TAKE 1 CAPSULE BY MOUTH EVERY MORNING, Disp: 30 capsule, Rfl: 0    polyethylene glycol (MIRALAX) 17 GM/SCOOP powder, Take 17 g by mouth Daily., Disp: 1530 g, Rfl: 1    Rimegepant Sulfate (Nurtec) 75 MG tablet dispersible tablet, Take 1 tablet by mouth Daily As Needed (Migraine)., Disp: 9 tablet, Rfl: 0    spironolactone (ALDACTONE) 25 MG tablet, TAKE 1 TABLET BY MOUTH DAILY, Disp: 90 tablet, Rfl: 1    tiZANidine (ZANAFLEX) 4 MG tablet, Take 1 tablet by mouth Every 8 (Eight) Hours As Needed for Muscle Spasms., Disp: 90 tablet, Rfl: 11    topiramate (TOPAMAX) 25 MG tablet, Take 1 tablet by mouth Daily., Disp: 90 tablet, Rfl: 1    Tirzepatide-Weight Management (ZEPBOUND) 2.5 MG/0.5ML solution auto-injector, Inject 0.5 mL under the skin into the appropriate area as directed 1 (One) Time Per Week. (Patient not taking: Reported on 6/2/2025), Disp: 2 mL, Rfl: 0    Past Medical History:   Diagnosis Date    ADHD     Allergic     Asthma     Cervical radiculopathy     Chronic pain disorder 04/14/2020    Neck and left arm    COPD     DDD, cervical     DEXA     2022= (-0.7/ -1.4)    Fibromyalgia     Hyperlipidemia     Hypertension     Joint pain 4/14/2020    Neck and left arm hands    Left shoulder pain     Low back pain     MAMMO     NEG = 2019/ 2020/ 2022/ 2024    Migraine headache     Myalgia      Neck pain     Obesity     Osteoarthritis     Osteopenia     PAP     ASCUS = 2019/ NEG =        Past Surgical History:   Procedure Laterality Date     SECTION      x 1    COLONOSCOPY      COLOGUARD ---NEG = 2022, rech     EPIDURAL BLOCK      LYMPH NODE DISSECTION      cervical node= NEG    TUBAL ABDOMINAL LIGATION         Family History   Problem Relation Age of Onset    Heart disease Mother     Osteoporosis Mother     Aortic aneurysm Mother     Hypertension Father     Kidney disease Father         KIDNEY STONES    Stroke Father     Heart disease Father     Anemia Sister     Arthritis Brother     Diabetes Brother     Heart disease Brother     Hypertension Brother     Heart disease Brother     Drug abuse Brother     Heart disease Maternal Grandmother     Aortic aneurysm Maternal Grandmother        Social History     Socioeconomic History    Marital status:    Tobacco Use    Smoking status: Every Day     Current packs/day: 1.00     Average packs/day: 1 pack/day for 30.0 years (30.0 ttl pk-yrs)     Types: Cigarettes     Passive exposure: Current    Smokeless tobacco: Never   Vaping Use    Vaping status: Some Days    Substances: CBD, ecig   Substance and Sexual Activity    Alcohol use: Yes     Comment: occ    Drug use: No    Sexual activity: Not Currently     Partners: Male     Birth control/protection: Tubal ligation, Surgical       History of Present Illness  The patient is a 59-year-old female who presents for follow-up from the ER for a sprain of the right knee.    She sustained an injury to her right leg after falling into a pool with her leg caught on the decking. The pain in her right leg has progressively worsened, particularly on the inside and around the knee area, leading to difficulty in walking. She reports no loss of consciousness or head trauma. An x-ray was performed in the ER to rule out any fractures. She suspects further internal damage beyond what was initially  diagnosed. Despite the use of a brace, she has not observed any improvement in her condition. She is able to bear weight on the leg but experiences a sensation of instability with any twisting motion. She has been advised to wear the brace continuously, even during sleep, but she removed it for showering and slept without it the previous night. She has not yet returned to the pool.    She recalls stepping up on the edge of the pool to retrieve a vacuum hose that had become unhooked and curled in the middle of the pool. As she leaned over, her foot slid forward, causing her to fall through a gap in the decking. She landed face-first in the water and believes she may have hyperextended her knee during the fall. She also reports that her left leg is consistently more swollen than her right, which she uses as a reference to gauge the swelling in her right leg. The injury resulted in immediate swelling and bruising, which she describes as severe.    She is not out of any of her regular medications. She received a letter from her insurance company stating that Zepbound is not covered under part D coverage. She called her insurance company, [employer], and they said they would cover it for 1 month.        The following portions of the patient's history were reviewed and updated as appropriate: allergies, current medications, past family history, past medical history, past social history, past surgical history and problem list.    Review of Systems   Constitutional:  Positive for activity change. Negative for appetite change, unexpected weight gain and unexpected weight loss.   Cardiovascular:  Positive for leg swelling.   Musculoskeletal:  Positive for arthralgias and joint swelling.   Skin:  Positive for bruise.       Vitals:    06/02/25 1119   BP: 116/74   Pulse: 88   Resp: 20   Temp: 97.8 °F (36.6 °C)   SpO2: 99%       Objective   Physical Exam  Vitals and nursing note reviewed.   Constitutional:       General: She is not  in acute distress.     Appearance: She is obese. She is not ill-appearing or toxic-appearing.   HENT:      Head: Normocephalic and atraumatic.   Pulmonary:      Effort: Pulmonary effort is normal.   Musculoskeletal:      Right knee: Swelling and ecchymosis present. No erythema. Tenderness present over the medial joint line and lateral joint line.      Instability Tests: Anterior drawer test negative. Posterior drawer test negative.      Right lower leg: Edema present.   Skin:     Findings: Bruising present.   Neurological:      Mental Status: She is alert and oriented to person, place, and time. Mental status is at baseline.      Gait: Gait abnormal.   Psychiatric:         Attention and Perception: Attention and perception normal.         Mood and Affect: Mood and affect normal.         Speech: Speech normal.         Behavior: Behavior normal. Behavior is cooperative.         Thought Content: Thought content normal.         Cognition and Memory: Cognition normal.         Judgment: Judgment normal.       Physical Exam      Assessment & Plan   Diagnoses and all orders for this visit:    1. Acute pain of right knee (Primary)  -     MRI Knee Right Without Contrast; Future    2. Traumatic ecchymosis of right knee, subsequent encounter  -     MRI Knee Right Without Contrast; Future      Assessment & Plan  1. Right knee sprain.  - The patient's ligaments appear to be intact upon examination.  - Reports significant pain and swelling, with bruising noted.  - An MRI has been ordered to further investigate the extent of the injury and determine if there are any torn ligaments or other issues.  - Advised to continue using the hinge brace, except during showers and potentially while sleeping, to allow some air circulation. If the MRI shows any significant findings, further treatment options will be considered.    2. Medication management.  - Advised to contact her insurance company, [employer], to inquire about the denial of  coverage for Zepbound.  - A printout of the denial letter will be provided for her reference.  - Discussed the possibility of coverage issues related to Medicare and the need to clarify with the insurance company.  - Patient will follow up with the insurance company to resolve the coverage issue.     Results  Imaging   - X-ray of the right knee: No fractures or chip fractures          Patient or patient representative verbalized consent for the use of Ambient Listening during the visit with  Dea Mcnally DO for chart documentation. 6/22/2025  17:21 EDT

## 2025-06-04 ENCOUNTER — PRIOR AUTHORIZATION (OUTPATIENT)
Dept: FAMILY MEDICINE CLINIC | Facility: CLINIC | Age: 60
End: 2025-06-04
Payer: MEDICARE

## 2025-06-04 NOTE — TELEPHONE ENCOUNTER
HUB to read    PA was sent for Zepbound 2.5MG/0.5ML pen-injectors    Waiting on the outcome from insurance.

## 2025-06-09 DIAGNOSIS — S80.01XD TRAUMATIC ECCHYMOSIS OF RIGHT KNEE, SUBSEQUENT ENCOUNTER: ICD-10-CM

## 2025-06-09 DIAGNOSIS — M25.561 ACUTE PAIN OF RIGHT KNEE: ICD-10-CM

## 2025-06-09 NOTE — TELEPHONE ENCOUNTER
HUB to relay     The Prior Authorization request for the above medication has been cancelled:  [employer] Prior Authorization Department does not manage Prior Authorizations for this medication.  Please contact member services phone number on the back of the prescription ID card.

## 2025-06-22 DIAGNOSIS — E66.812 CLASS 2 SEVERE OBESITY DUE TO EXCESS CALORIES WITH SERIOUS COMORBIDITY AND BODY MASS INDEX (BMI) OF 36.0 TO 36.9 IN ADULT: ICD-10-CM

## 2025-06-22 DIAGNOSIS — E66.01 CLASS 2 SEVERE OBESITY DUE TO EXCESS CALORIES WITH SERIOUS COMORBIDITY AND BODY MASS INDEX (BMI) OF 36.0 TO 36.9 IN ADULT: ICD-10-CM

## 2025-06-23 RX ORDER — PHENTERMINE HYDROCHLORIDE 15 MG/1
15 CAPSULE ORAL EVERY MORNING
Qty: 30 CAPSULE | Refills: 0 | Status: SHIPPED | OUTPATIENT
Start: 2025-06-23

## 2025-06-23 NOTE — TELEPHONE ENCOUNTER
DUPLICATE  INSPECT RAN    Rx Refill Note  Requested Prescriptions     Pending Prescriptions Disp Refills    phentermine 15 MG capsule [Pharmacy Med Name: PHENTERMINE 15 MG CAPSULE] 30 capsule      Sig: TAKE 1 CAPSULE BY MOUTH EVERY MORNING      Last office visit with prescribing clinician: 6/2/2025   Last telemedicine visit with prescribing clinician: Visit date not found   Next office visit with prescribing clinician: 6/22/2025                         Would you like a call back once the refill request has been completed: [] Yes [] No    If the office needs to give you a call back, can they leave a voicemail: [] Yes [] No    Torie Manjarrez, RT  06/23/25, 13:28 EDT

## 2025-07-14 ENCOUNTER — TREATMENT (OUTPATIENT)
Dept: PHYSICAL THERAPY | Facility: CLINIC | Age: 60
End: 2025-07-14
Payer: MEDICARE

## 2025-07-14 DIAGNOSIS — S83.411A TEAR OF MCL (MEDIAL COLLATERAL LIGAMENT) OF KNEE, RIGHT, INITIAL ENCOUNTER: Primary | ICD-10-CM

## 2025-07-14 DIAGNOSIS — R26.9 GAIT DISTURBANCE: ICD-10-CM

## 2025-07-14 PROCEDURE — 97161 PT EVAL LOW COMPLEX 20 MIN: CPT | Performed by: PHYSICAL THERAPIST

## 2025-07-14 PROCEDURE — 97110 THERAPEUTIC EXERCISES: CPT | Performed by: PHYSICAL THERAPIST

## 2025-07-14 PROCEDURE — G0283 ELEC STIM OTHER THAN WOUND: HCPCS | Performed by: PHYSICAL THERAPIST

## 2025-07-14 NOTE — PROGRESS NOTES
Physical Therapy Initial Evaluation and Plan of Care      Patient: Nicki Nielsen   : 1965  Primary diagnosis/ICD-10 Code:  Tear of MCL (medial collateral ligament) of knee, right, initial encounter [S83.411A]  Referring practitioner: Alfredo Aguiar MD  Date of Initial Visit: 2025  Nicki Nielsen was seen by Kobe Jackson PT at [facility]  [address]  [place] [address]  Fax [phone]  Phone [phone]   Today's Date: 2025  Patient seen for 1 sessions    Visit Diagnoses:     ICD-10-CM ICD-9-CM   1. Tear of MCL (medial collateral ligament) of knee, right, initial encounter  S83.411A 844.1   2. Gait disturbance  R26.9 781.2              Subjective Questionnaire: LEFS: 12/80 x 100 = 15% function; 85% disability      Subjective Evaluation    History of Present Illness  Date of onset: 2025  Mechanism of injury: 58 y/o female fell into pool while trying to get vacuum hose, twisted knee: sustained MCL injury to R knee. Seen in ER and referred to orthopedic MD: MRI revealed grade 2-3 MCL tear and tricompartmental OA. Patient prescribed brace and reports knee is feeling better in brace with slow improvement.     C/o pain on inside of knee and surrounding patella; weakness; difficulty walking - uses cane and brace for support. Also, reports 2nd and 3rd toes pulling/cramping. Occasionally, numbness in R foot since injury. Has pool at home, but increases pain: 4-5 steps to get to deck and into pool.    PMH and pertinent information reviewed in Epic.         Patient Occupation: Disability due to back injury: controlled with medications Pain  Current pain ratin  At best pain ratin  At worst pain ratin  Location: insdie of knee and surrounding patella,occasionaly down leg from middle thigh to ankle  Quality: discomfort, dull ache, sharp and radiating  Relieving factors: medications and rest  Aggravating  factors: movement, ambulation and squatting (ice - worse: makes it worse; bending knee)    Social Support  Lives in: one-story house (no steps)  Lives with: adult children (can hlep with shopping, etc.)    Diagnostic Tests  MRI studies: abnormal (see media)    Patient Goals  Patient goals for therapy: decreased pain, increased strength, increased motion, return to sport/leisure activities and independence with ADLs/IADLs             Objective          Observations     Additional Knee Observation Details  Hinged Brace R knee    Tenderness     Right Knee   Tenderness in the medial joint line and medial retinaculum.     Active Range of Motion   Left Knee   Flexion: 132 degrees   Extension: 0 degrees     Right Knee   Flexion: 108 degrees with pain  Extension: 0 degrees     Additional Active Range of Motion Details  Measured in supine    Strength/Myotome Testing     Left Knee   Normal strength  Quadriceps contraction: good    Right Knee   Flexion: 4  Extension: 4-  Quadriceps contraction: fair    Swelling     Left Knee Girth Measurement (cm)   Joint line: 51 cm    Right Knee Girth Measurement (cm)   Joint line: 52 cm    Ambulation   Weight-Bearing Status   Weight-Bearing Status (Left): full weight bearing   Weight-Bearing Status (Right): weight-bearing as tolerated    Assistive device used: single point cane    Additional Weight-Bearing Status Details  Hinged brace    Ambulation: Level Surfaces   Ambulation with assistive device: independent    Ambulation: Stairs   Ascend stairs: independent  Pattern: non-reciprocal  Railings: two rails  Descend stairs: independent  Pattern: non-reciprocal  Railings: two rails    Observational Gait   Gait: antalgic and asymmetric   Increased left stance time. Decreased walking speed, stride length and right stance time.       Access Code: [email or web address]  URL: [email or web address]  Date: 07/14/2025  Prepared by: Kobe Jackson    Exercises  - Supine Quadricep Sets  - 2 x daily - 7 x  weekly - 2 sets - 10 reps - 5 sec hold  - Supine Heel Slide with Strap  - 2 x daily - 7 x weekly - 2 sets - 10 reps - 3-5 sec hold  - Supine Knee Extension Strengthening  - 2 x daily - 7 x weekly - 2 sets - 10 reps - 3-5 sec hold  - reviewed precautions and use of TENS for pain control    Assessment & Plan       Assessment  Impairments: abnormal gait, abnormal muscle firing, abnormal muscle tone, abnormal or restricted ROM, activity intolerance, impaired balance, impaired physical strength, lacks appropriate home exercise program, pain with function and weight-bearing intolerance   Functional limitations: walking, uncomfortable because of pain and standing   Assessment details: The patient is a 59 y.o. female who presents to physical therapy with diagnosis of MCL tear R knee secondary to fall. Upon initial evaluation, the patient demonstrates the following impairments: pain, decreased ROM and strength; mild swelling; impaired gait with dependence on AD and brace. Examination finding consistent with diagnosis.    Due to these impairments, the patient is unable to perform or has difficulty with the following functional tasks: shopping; walking; household chores; squatting; sleeping interrupted due to positioning of knee. The patient would benefit from skilled PT services to address functional limitations and impairments and to improve patient quality of life.    Barriers to therapy: complex medical history with history of disability  Prognosis: good    Goals  Plan Goals: STG:  Pt will be independent and compliant with initial HEP in 3-4 weeks.  Pt will report a 50% improvement in symptoms since starting therapy in 3-4 weeks.  Pt will demonstrate an increase in R knee ROM to 110 degrees within 3-4 weeks.   Pt will report pain level at worst <3 during walking activity in 3-4 weeks.    LTG: by DC (12 weeks)  Pt will be independent with final HEP for self-management of condition by DC.  Pt will improve score on LEFS to  greater than 65/80 by DC: limited by disability.  Pt will report a 75% improvement in symptoms by DC in order to allow return to PLOF.  Pt will improve knee AROM to at least 130 deg in order to be able to return to squat by DC.  Pt will improve R knee strength to at least 5/5 in order to be able to return to normal activities by DC.  Pt will improve single leg balance to >20 seconds and without pain in order to improve ability to walk on uneven surfaces.       Plan  Therapy options: will be seen for skilled therapy services  Planned modality interventions: cryotherapy, electrical stimulation/Russian stimulation, thermotherapy (hydrocollator packs), ultrasound and TENS  Planned therapy interventions: abdominal trunk stabilization, ADL retraining, balance/weight-bearing training, body mechanics training, flexibility, functional ROM exercises, gait training, home exercise program, joint mobilization, manual therapy, motor coordination training, neuromuscular re-education, orthotic fitting/training, postural training, soft tissue mobilization, spinal/joint mobilization, strengthening, stretching and therapeutic activities  Frequency: 2x week  Duration in weeks: 12  Treatment plan discussed with: patient        History # of Personal Factors and/or Comorbidities: LOW (0)  Examination of Body System(s): # of elements: LOW (1-2)  Clinical Presentation: EVOLVING  Clinical Decision Making: MODERATE      Timed:         Manual Therapy:         mins  35073;     Therapeutic Exercise:    15     mins  79862;     Neuromuscular Earline:        mins  38989;    Therapeutic Activity:          mins  13814;     Gait Training:           mins  84789;     Ultrasound:          mins  67691;    Ionto                                   mins   55640  Self Care                            mins   46648  Aquatic                               mins 21637      Un-Timed:  Canalith Repositioning _ mins 92386  Electrical Stimulation:   10      mins  66475 (  );  Dry Needling          mins self-pay  Traction          mins 87165  Low Eval    35      Mins  28282  Mod Eval          Mins  02381  High Eval                            Mins  24972  Re-Eval                              mins  25934        Timed Treatment:   15   mins   Total Treatment:      60  mins    PT SIGNATURE: Kobe Jackson PT   [place] License#: [id number]       Electronically signed by Kobe Jackson PT, 07/14/25, 11:12 AM EDT      Medicare Initial Certification  Certification Period:  7/14/2025 thru 10/11/2025  I certify that the therapy services are furnished while this patient is under my care.  The services outlined above are required by this patient, and will be reviewed every 90 days.       Physician Signature:__________________________________________________    PHYSICIAN: Alfredo Aguiar MD      DATE:     Please sign and return via fax to [phone].. Thank you, [facility].

## 2025-07-15 ENCOUNTER — OFFICE VISIT (OUTPATIENT)
Dept: PAIN MEDICINE | Facility: CLINIC | Age: 60
End: 2025-07-15
Payer: MEDICARE

## 2025-07-15 VITALS
HEART RATE: 95 BPM | RESPIRATION RATE: 16 BRPM | OXYGEN SATURATION: 98 % | DIASTOLIC BLOOD PRESSURE: 85 MMHG | SYSTOLIC BLOOD PRESSURE: 144 MMHG | BODY MASS INDEX: 36.52 KG/M2 | WEIGHT: 240.2 LBS

## 2025-07-15 DIAGNOSIS — M54.2 CERVICALGIA: ICD-10-CM

## 2025-07-15 DIAGNOSIS — M54.12 CERVICAL RADICULOPATHY: ICD-10-CM

## 2025-07-15 DIAGNOSIS — M54.41 CHRONIC BILATERAL LOW BACK PAIN WITH SCIATICA, SCIATICA LATERALITY UNSPECIFIED: ICD-10-CM

## 2025-07-15 DIAGNOSIS — G89.29 CHRONIC BILATERAL LOW BACK PAIN WITH SCIATICA, SCIATICA LATERALITY UNSPECIFIED: ICD-10-CM

## 2025-07-15 DIAGNOSIS — G43.C0 PERIODIC HEADACHE SYNDROME, NOT INTRACTABLE: ICD-10-CM

## 2025-07-15 DIAGNOSIS — M79.10 MYALGIA: ICD-10-CM

## 2025-07-15 DIAGNOSIS — G89.29 CHRONIC BILATERAL THORACIC BACK PAIN: ICD-10-CM

## 2025-07-15 DIAGNOSIS — M48.02 CERVICAL SPINAL STENOSIS: ICD-10-CM

## 2025-07-15 DIAGNOSIS — Z79.899 HIGH RISK MEDICATION USE: Primary | ICD-10-CM

## 2025-07-15 DIAGNOSIS — M54.42 CHRONIC BILATERAL LOW BACK PAIN WITH SCIATICA, SCIATICA LATERALITY UNSPECIFIED: ICD-10-CM

## 2025-07-15 DIAGNOSIS — M79.7 FIBROMYALGIA: ICD-10-CM

## 2025-07-15 DIAGNOSIS — G89.4 CHRONIC PAIN DISORDER: ICD-10-CM

## 2025-07-15 DIAGNOSIS — M54.6 CHRONIC BILATERAL THORACIC BACK PAIN: ICD-10-CM

## 2025-07-15 RX ORDER — HYDROCODONE BITARTRATE AND ACETAMINOPHEN 10; 325 MG/1; MG/1
1 TABLET ORAL 2 TIMES DAILY PRN
Qty: 60 TABLET | Refills: 0 | Status: SHIPPED | OUTPATIENT
Start: 2025-07-15

## 2025-07-15 RX ORDER — IBUPROFEN 800 MG/1
800 TABLET, FILM COATED ORAL EVERY 8 HOURS PRN
Qty: 90 TABLET | Refills: 0 | Status: SHIPPED | OUTPATIENT
Start: 2025-07-15

## 2025-07-15 NOTE — PROGRESS NOTES
Subjective   Nicki Nielsen is a 59 y.o. female.     History of Present Illness  CC: neck and LBP    Neck and back pain, 10/10 at worst, 7/10 at best, began several decades ago, began treatment with me 7/2/19, always present, varies, aching, stabbing, worse with bending and lifting, interferes with ADLs, sleep, activity, failed PT, chiropractor, meds. MRI C-spine with DDD, stenosis. Saw Dr. Overton, had PT, given Norco 10mg TID prn, began receiving opioids from PCP, down to BID with less relief, PCP retiring, increased here to TID prn, then down to BID prn again. Failed injections, surgery in past. Back pain worse after housework, no b/b incontinence, no new weakness or numbness. Also c/o b/l thumb and great toe pain. Injured R knee, MCL, seeing Ortho. Taking Norco 10mg BID prn currently. Here for f/u.   Back Pain  Associated symptoms: no abdominal pain, no bladder incontinence, no chest pain, no fever, no numbness and no weakness         The following portions of the patient's history were reviewed and updated as appropriate: allergies, current medications, past family history, past medical history, past social history, past surgical history and problem list.    Review of Systems   Constitutional:  Positive for fatigue. Negative for chills and fever.   HENT:  Negative for hearing loss and trouble swallowing.    Eyes:  Negative for visual disturbance.   Respiratory:  Negative for shortness of breath.    Cardiovascular:  Negative for chest pain.   Gastrointestinal:  Positive for constipation and nausea. Negative for abdominal pain, diarrhea and vomiting.   Genitourinary:  Negative for urinary incontinence.   Musculoskeletal:  Positive for arthralgias, back pain, joint swelling and neck pain. Negative for myalgias.   Neurological:  Positive for dizziness and headache. Negative for weakness and numbness.       Objective   Physical Exam   Constitutional: She is oriented to person, place, and time. She appears  well-developed and well-nourished.   HENT:   Head: Normocephalic and atraumatic.   Eyes: Pupils are equal, round, and reactive to light.   Cardiovascular: Normal rate, regular rhythm and normal heart sounds.   Pulmonary/Chest: Breath sounds normal.   Abdominal: Soft. Bowel sounds are normal. She exhibits no distension. There is no abdominal tenderness.   Neurological: She is alert and oriented to person, place, and time. She has normal reflexes. She displays normal reflexes. No sensory deficit.   Psychiatric: Her behavior is normal. Thought content normal.         Assessment & Plan   Diagnoses and all orders for this visit:    1. Chronic bilateral low back pain with sciatica, sciatica laterality unspecified (Primary)    2. Cervicalgia    3. Cervical spinal stenosis    4. Cervical radiculopathy    5. Chronic bilateral thoracic back pain    6. Chronic pain disorder    7. Fibromyalgia    8. Periodic headache syndrome, not intractable    9. Myalgia      Inspect reviewed, in order. UDS 7/20/21 with THC, has been around people who use marijuana, repeat UDS in order 8/26/22, 6/29/23, 7/2/24.  Treatment plan will consist of continuing current medication as long as it remains effective and is necessary, while evaluating patient at each visit and determining if the medication can be lowered or discontinued, while also using nonopioid therapies to reduce reliance on opioids.  Increased to Norco 10mg QID prn, then reduced to TID prn, then BID prn, back to TID prn, now keeping her awake. Rotated to Percocet 7.5mg TID prn, felt poorly and slept poorly. Rotated back to Norco 10mg TID prn. Began Norco 10mg BD prn during the day, MS-Contin 15mg qHS but had breathing issues with morphine, stopped. Continue Norco 10mg BID prn. Filled 6/28/25.   Patient's pain is still well-managed by current medication regimen, is doing well at this strength and dosage, therefore I will continue to prescribe unchanged as the most appropriate course of  treatment.  Began Tizanidine 4mg TID prn, refill.  Began Lyrica 50mg BID, cannot tolerate Gabapentin.  Had bad reaction to Silenor 6mg qHS prn, stopped. May try OTC Melatonin.  Failed injections, surgery.  Insurance will not cover comp cream. Cont Voltaren.  Pt will ask PCP for referral to allergist for possible bee sting allergy.  RTC 3 months for f/u.                     Physical Exam  Constitutional:       Appearance: She is well-developed.   HENT:      Head: Normocephalic and atraumatic.   Eyes:      Pupils: Pupils are equal, round, and reactive to light.   Cardiovascular:      Rate and Rhythm: Normal rate and regular rhythm.      Heart sounds: Normal heart sounds.   Pulmonary:      Breath sounds: Normal breath sounds.   Abdominal:      General: Bowel sounds are normal. There is no distension.      Palpations: Abdomen is soft.      Tenderness: There is no abdominal tenderness.   Neurological:      Mental Status: She is alert and oriented to person, place, and time.      Sensory: No sensory deficit.      Deep Tendon Reflexes: Reflexes are normal and symmetric. Reflexes normal.   Psychiatric:         Behavior: Behavior normal.         Thought Content: Thought content normal.

## 2025-07-21 ENCOUNTER — TREATMENT (OUTPATIENT)
Dept: PHYSICAL THERAPY | Facility: CLINIC | Age: 60
End: 2025-07-21
Payer: MEDICARE

## 2025-07-21 DIAGNOSIS — S83.411A TEAR OF MCL (MEDIAL COLLATERAL LIGAMENT) OF KNEE, RIGHT, INITIAL ENCOUNTER: Primary | ICD-10-CM

## 2025-07-21 DIAGNOSIS — R26.9 GAIT DISTURBANCE: ICD-10-CM

## 2025-07-21 NOTE — PROGRESS NOTES
Physical Therapy Daily Treatment Note      [facility] [place]              [address], [address]                [place]  [address]        Patient: Nicki Nielesn   : 1965  Diagnosis/ICD-10 Code:  Tear of MCL (medial collateral ligament) of knee, right, initial encounter [S83.411A]  Referring practitioner: Alfredo Aguiar MD  Date of Initial Visit: Type: THERAPY  Noted: 2025  Today's Date: 2025  Patient seen for 2 sessions         Visit Diagnoses:    ICD-10-CM ICD-9-CM   1. Tear of MCL (medial collateral ligament) of knee, right, initial encounter  S83.411A 844.1   2. Gait disturbance  R26.9 781.2         Subjective    Nicki Nielsen reports: she probably over did over the weekend.  She went [place].  She reported she is having pain under knee today.  She rated it 4/10.            Objective   See Exercise, Manual, and Modality Logs for complete treatment.     Education: Updated HEP.    Assessment/Plan  Gentle progression of exercises today as noted.  She tolerated well with some fatigue but no increased pain.  Difficulty with SLR VMO strengthening with minimal range noted.  Able to progress HEP d/t tolerance and technique.  Will monitor and progress exercises as able.      Progress per Plan of Care           Timed:  Manual Therapy:         mins  98911;  Therapeutic Exercise:    25     mins  01480;     Neuromuscular Earline:   3     mins  81850;    Therapeutic Activity:          mins  56828;     Gait Training:           mins  09992;     Ultrasound:          mins  95432;     Work Conditioning/Hardening (initial 2 hours)        mins  04151  Work Conditioning/Hardening (each add'l hour)        mins  36643    Untimed:   Electrical Stimulation:   10      mins  74848 ( );  Traction          mins 98531    Timed Treatment:   28   mins   Total Treatment:     38   mins    Katia Guevara PTA  Physical Therapist Assistant  [place] License# [id number]

## 2025-07-21 NOTE — PATIENT INSTRUCTIONS
Access Code: [email or web address]  URL: [email or web address]  Date: 07/21/2025  Prepared by: Katia Guevara    Exercises  - Supine Hip Adduction Isometric with Ball  - 1 x daily - 7 x weekly - 1 sets - 20 reps - 5 hold  - Supine Active Straight Leg Raise  - 1 x daily - 7 x weekly - 1 sets - 10 reps - 5 hold  - Sidelying Hip Abduction  - 1 x daily - 7 x weekly - 1 sets - 10 reps - 5 hold

## 2025-07-23 ENCOUNTER — TREATMENT (OUTPATIENT)
Dept: PHYSICAL THERAPY | Facility: CLINIC | Age: 60
End: 2025-07-23
Payer: MEDICARE

## 2025-07-23 DIAGNOSIS — S83.411A TEAR OF MCL (MEDIAL COLLATERAL LIGAMENT) OF KNEE, RIGHT, INITIAL ENCOUNTER: Primary | ICD-10-CM

## 2025-07-23 DIAGNOSIS — R26.9 GAIT DISTURBANCE: ICD-10-CM

## 2025-07-23 NOTE — PROGRESS NOTES
"   Physical Therapy Daily Treatment Note      [facility] [place]              [address], [address]                [place]  [address]        Patient: Nicki Nielsen   : 1965  Diagnosis/ICD-10 Code:  Tear of MCL (medial collateral ligament) of knee, right, initial encounter [S83.411A]  Referring practitioner: Alfredo Aguiar MD  Date of Initial Visit: Type: THERAPY  Noted: 2025  Today's Date: 2025  Patient seen for 3 sessions         Visit Diagnoses:    ICD-10-CM ICD-9-CM   1. Tear of MCL (medial collateral ligament) of knee, right, initial encounter  S83.411A 844.1   2. Gait disturbance  R26.9 781.2         Subjective    Nicki Nielsen reports: \"it's pissed.\"  She rated it 5/10.  She isn't sure what she did. She said she didn't do much yesterday and isn't sure if that is part of the problem.           Objective   See Exercise, Manual, and Modality Logs for complete treatment.     Education: Discussed use of STC in contralateral UE however pt reported previous shoulder injury.    Assessment/Plan  Gentle progression to exercises as noted.  Maintained NWB exercises as pt with increased pain overall today.  She reported some decreased pain with exercises.  Also able to increase time on nustep. Continued with estim for pain relief.  Will monitor and progress exercises and HEP as tolerated.     Progress per Plan of Care           Timed:  Manual Therapy:         mins  14136;  Therapeutic Exercise:    26     mins  01948;     Neuromuscular Earline:        mins  87091;    Therapeutic Activity:          mins  51865;     Gait Training:           mins  68712;     Ultrasound:          mins  46515;     Work Conditioning/Hardening (initial 2 hours)        mins  47146  Work Conditioning/Hardening (each add'l hour)        mins  98344    Untimed:   Electrical Stimulation:    15     mins  28466 ( );  Traction          mins 14562    Timed Treatment:   26   mins   Total Treatment:     41   " yuri Guevara [facility]  Physical Therapist Assistant  [place] License# [id number]

## 2025-07-27 DIAGNOSIS — E66.812 CLASS 2 SEVERE OBESITY DUE TO EXCESS CALORIES WITH SERIOUS COMORBIDITY AND BODY MASS INDEX (BMI) OF 36.0 TO 36.9 IN ADULT: ICD-10-CM

## 2025-07-27 DIAGNOSIS — E66.01 CLASS 2 SEVERE OBESITY DUE TO EXCESS CALORIES WITH SERIOUS COMORBIDITY AND BODY MASS INDEX (BMI) OF 36.0 TO 36.9 IN ADULT: ICD-10-CM

## 2025-07-28 RX ORDER — PHENTERMINE HYDROCHLORIDE 15 MG/1
15 CAPSULE ORAL EVERY MORNING
Qty: 30 CAPSULE | Refills: 0 | OUTPATIENT
Start: 2025-07-28

## 2025-07-28 RX ORDER — PHENTERMINE HYDROCHLORIDE 15 MG/1
15 CAPSULE ORAL EVERY MORNING
Qty: 30 CAPSULE | Refills: 0 | Status: SHIPPED | OUTPATIENT
Start: 2025-07-28

## 2025-07-28 NOTE — TELEPHONE ENCOUNTER
INSPECT RAN    Rx Refill Note  Requested Prescriptions     Pending Prescriptions Disp Refills    phentermine 15 MG capsule [Pharmacy Med Name: PHENTERMINE 15 MG CAPSULE] 30 capsule      Sig: TAKE 1 CAPSULE BY MOUTH EVERY MORNING      Last office visit with prescribing clinician: 6/2/2025   Last telemedicine visit with prescribing clinician: Visit date not found   Next office visit with prescribing clinician: 7/27/2025                         Would you like a call back once the refill request has been completed: [] Yes [] No    If the office needs to give you a call back, can they leave a voicemail: [] Yes [] No    Torie Manjarrez, RT  07/28/25, 09:56 EDT

## 2025-07-29 ENCOUNTER — TREATMENT (OUTPATIENT)
Dept: PHYSICAL THERAPY | Facility: CLINIC | Age: 60
End: 2025-07-29
Payer: MEDICARE

## 2025-07-29 DIAGNOSIS — S83.411A TEAR OF MCL (MEDIAL COLLATERAL LIGAMENT) OF KNEE, RIGHT, INITIAL ENCOUNTER: Primary | ICD-10-CM

## 2025-07-29 DIAGNOSIS — R26.9 GAIT DISTURBANCE: ICD-10-CM

## 2025-07-29 NOTE — PROGRESS NOTES
Physical Therapy Daily Treatment Note      [facility] [place]              [address], [address]                [place]  [address]        Patient: Nicki Nielsen   : 1965  Diagnosis/ICD-10 Code:  Tear of MCL (medial collateral ligament) of knee, right, initial encounter [S83.411A]  Referring practitioner: Alfredo Aguiar MD  Date of Initial Visit: Type: THERAPY  Noted: 2025  Today's Date: 2025  Patient seen for 4 sessions         Visit Diagnoses:    ICD-10-CM ICD-9-CM   1. Tear of MCL (medial collateral ligament) of knee, right, initial encounter  S83.411A 844.1   2. Gait disturbance  R26.9 781.2         Subjective    Nicki Nielsen reports: it was pretty good this morning until she didn't have her brace on for a few minutes and it popped.  It has been hurting since it popped and she rated it 5/10.            Objective   See Exercise, Manual, and Modality Logs for complete treatment.       Assessment/Plan  Progressed exercises as noted with good tolerance.  Verbal and tactile cues for initial alignment with new exercises however able to correct following cues.  Some aggravation of symptoms however able to complete.  Will monitor symptoms and progress as able next session.        Progress per Plan of Care           Timed:  Manual Therapy:         mins  54171;  Therapeutic Exercise:    35     mins  29012;     Neuromuscular Earline:    3    mins  79845;    Therapeutic Activity:          mins  48996;     Gait Training:           mins  70992;     Ultrasound:          mins  16431;     Work Conditioning/Hardening (initial 2 hours)        mins  80442  Work Conditioning/Hardening (each add'l hour)        mins  80145    Untimed:   Electrical Stimulation:   10      mins  49915 ( );  Traction          mins 37319    Timed Treatment:   38   mins   Total Treatment:     38   mins    Katia Guevara PTA  Physical Therapist Assistant  [place] License# [id number]

## 2025-08-05 ENCOUNTER — OFFICE VISIT (OUTPATIENT)
Dept: FAMILY MEDICINE CLINIC | Facility: CLINIC | Age: 60
End: 2025-08-05
Payer: MEDICARE

## 2025-08-05 VITALS
WEIGHT: 241.2 LBS | OXYGEN SATURATION: 96 % | SYSTOLIC BLOOD PRESSURE: 125 MMHG | HEIGHT: 68 IN | DIASTOLIC BLOOD PRESSURE: 79 MMHG | HEART RATE: 90 BPM | BODY MASS INDEX: 36.56 KG/M2 | TEMPERATURE: 98.2 F

## 2025-08-05 DIAGNOSIS — I10 PRIMARY HYPERTENSION: Chronic | ICD-10-CM

## 2025-08-05 DIAGNOSIS — S89.91XD INJURY OF RIGHT KNEE, SUBSEQUENT ENCOUNTER: ICD-10-CM

## 2025-08-05 DIAGNOSIS — E11.9 TYPE 2 DIABETES MELLITUS WITHOUT COMPLICATION, WITHOUT LONG-TERM CURRENT USE OF INSULIN: Primary | ICD-10-CM

## 2025-08-05 LAB
EXPIRATION DATE: ABNORMAL
HBA1C MFR BLD: 6.4 % (ref 4.5–5.7)
Lab: ABNORMAL

## 2025-08-05 RX ORDER — SPIRONOLACTONE 25 MG/1
25 TABLET ORAL DAILY
Qty: 90 TABLET | Refills: 0 | Status: SHIPPED | OUTPATIENT
Start: 2025-08-05

## 2025-08-05 RX ORDER — TOPIRAMATE 25 MG/1
25 TABLET, FILM COATED ORAL DAILY
Qty: 90 TABLET | Refills: 0 | Status: SHIPPED | OUTPATIENT
Start: 2025-08-05

## 2025-08-05 RX ORDER — DILTIAZEM HYDROCHLORIDE 120 MG/1
120 CAPSULE, EXTENDED RELEASE ORAL NIGHTLY
Qty: 90 CAPSULE | Refills: 0 | Status: SHIPPED | OUTPATIENT
Start: 2025-08-05

## 2025-08-05 RX ORDER — EPINEPHRINE 0.3 MG/.3ML
0.3 INJECTION SUBCUTANEOUS AS NEEDED
Qty: 2 EACH | Refills: 0 | Status: SHIPPED | OUTPATIENT
Start: 2025-08-05

## 2025-08-05 RX ORDER — DULAGLUTIDE 0.75 MG/.5ML
0.75 INJECTION, SOLUTION SUBCUTANEOUS
Qty: 2 ML | Refills: 0 | Status: SHIPPED | OUTPATIENT
Start: 2025-08-05

## 2025-08-06 ENCOUNTER — TREATMENT (OUTPATIENT)
Dept: PHYSICAL THERAPY | Facility: CLINIC | Age: 60
End: 2025-08-06
Payer: MEDICARE

## 2025-08-06 DIAGNOSIS — S83.411A TEAR OF MCL (MEDIAL COLLATERAL LIGAMENT) OF KNEE, RIGHT, INITIAL ENCOUNTER: Primary | ICD-10-CM

## 2025-08-06 DIAGNOSIS — R26.9 GAIT DISTURBANCE: ICD-10-CM

## 2025-08-11 ENCOUNTER — TREATMENT (OUTPATIENT)
Dept: PHYSICAL THERAPY | Facility: CLINIC | Age: 60
End: 2025-08-11
Payer: MEDICARE

## 2025-08-11 DIAGNOSIS — S83.411A TEAR OF MCL (MEDIAL COLLATERAL LIGAMENT) OF KNEE, RIGHT, INITIAL ENCOUNTER: Primary | ICD-10-CM

## 2025-08-11 DIAGNOSIS — R26.9 GAIT DISTURBANCE: ICD-10-CM

## 2025-08-11 PROCEDURE — 97110 THERAPEUTIC EXERCISES: CPT | Performed by: PHYSICAL THERAPIST

## 2025-08-11 PROCEDURE — G0283 ELEC STIM OTHER THAN WOUND: HCPCS | Performed by: PHYSICAL THERAPIST

## 2025-08-13 ENCOUNTER — TREATMENT (OUTPATIENT)
Dept: PHYSICAL THERAPY | Facility: CLINIC | Age: 60
End: 2025-08-13
Payer: MEDICARE

## 2025-08-13 ENCOUNTER — PRIOR AUTHORIZATION (OUTPATIENT)
Dept: FAMILY MEDICINE CLINIC | Facility: CLINIC | Age: 60
End: 2025-08-13
Payer: MEDICARE

## 2025-08-13 DIAGNOSIS — S83.411A TEAR OF MCL (MEDIAL COLLATERAL LIGAMENT) OF KNEE, RIGHT, INITIAL ENCOUNTER: Primary | ICD-10-CM

## 2025-08-13 DIAGNOSIS — R26.9 GAIT DISTURBANCE: ICD-10-CM

## 2025-08-18 ENCOUNTER — TREATMENT (OUTPATIENT)
Dept: PHYSICAL THERAPY | Facility: CLINIC | Age: 60
End: 2025-08-18
Payer: MEDICARE

## 2025-08-18 DIAGNOSIS — S83.411A TEAR OF MCL (MEDIAL COLLATERAL LIGAMENT) OF KNEE, RIGHT, INITIAL ENCOUNTER: Primary | ICD-10-CM

## 2025-08-18 DIAGNOSIS — R26.9 GAIT DISTURBANCE: ICD-10-CM

## 2025-08-18 PROCEDURE — 97110 THERAPEUTIC EXERCISES: CPT | Performed by: PHYSICAL THERAPIST

## 2025-08-18 PROCEDURE — 97530 THERAPEUTIC ACTIVITIES: CPT | Performed by: PHYSICAL THERAPIST

## 2025-08-18 PROCEDURE — G0283 ELEC STIM OTHER THAN WOUND: HCPCS | Performed by: PHYSICAL THERAPIST

## 2025-08-20 ENCOUNTER — TREATMENT (OUTPATIENT)
Dept: PHYSICAL THERAPY | Facility: CLINIC | Age: 60
End: 2025-08-20
Payer: MEDICARE

## 2025-08-20 DIAGNOSIS — R26.9 GAIT DISTURBANCE: ICD-10-CM

## 2025-08-20 DIAGNOSIS — S83.411A TEAR OF MCL (MEDIAL COLLATERAL LIGAMENT) OF KNEE, RIGHT, INITIAL ENCOUNTER: Primary | ICD-10-CM

## 2025-08-25 ENCOUNTER — TELEPHONE (OUTPATIENT)
Dept: PHYSICAL THERAPY | Facility: CLINIC | Age: 60
End: 2025-08-25

## 2025-08-25 DIAGNOSIS — E66.812 CLASS 2 SEVERE OBESITY DUE TO EXCESS CALORIES WITH SERIOUS COMORBIDITY AND BODY MASS INDEX (BMI) OF 36.0 TO 36.9 IN ADULT: ICD-10-CM

## 2025-08-25 DIAGNOSIS — E66.01 CLASS 2 SEVERE OBESITY DUE TO EXCESS CALORIES WITH SERIOUS COMORBIDITY AND BODY MASS INDEX (BMI) OF 36.0 TO 36.9 IN ADULT: ICD-10-CM

## 2025-08-25 RX ORDER — PHENTERMINE HYDROCHLORIDE 15 MG/1
15 CAPSULE ORAL EVERY MORNING
Qty: 30 CAPSULE | Refills: 0 | Status: SHIPPED | OUTPATIENT
Start: 2025-08-25

## 2025-08-27 ENCOUNTER — TREATMENT (OUTPATIENT)
Dept: PHYSICAL THERAPY | Facility: CLINIC | Age: 60
End: 2025-08-27
Payer: MEDICARE

## 2025-08-27 DIAGNOSIS — R26.9 GAIT DISTURBANCE: ICD-10-CM

## 2025-08-27 DIAGNOSIS — S83.411A TEAR OF MCL (MEDIAL COLLATERAL LIGAMENT) OF KNEE, RIGHT, INITIAL ENCOUNTER: Primary | ICD-10-CM
